# Patient Record
Sex: FEMALE | Race: WHITE | Employment: FULL TIME | URBAN - METROPOLITAN AREA
[De-identification: names, ages, dates, MRNs, and addresses within clinical notes are randomized per-mention and may not be internally consistent; named-entity substitution may affect disease eponyms.]

---

## 2017-02-06 ENCOUNTER — OFFICE VISIT (OUTPATIENT)
Dept: FAMILY MEDICINE CLINIC | Age: 47
End: 2017-02-06

## 2017-02-06 VITALS
SYSTOLIC BLOOD PRESSURE: 120 MMHG | HEART RATE: 80 BPM | WEIGHT: 276 LBS | HEIGHT: 71 IN | BODY MASS INDEX: 38.64 KG/M2 | DIASTOLIC BLOOD PRESSURE: 70 MMHG

## 2017-02-06 DIAGNOSIS — L28.2 PRURITIC RASH: Primary | ICD-10-CM

## 2017-02-06 PROCEDURE — 99213 OFFICE O/P EST LOW 20 MIN: CPT | Performed by: FAMILY MEDICINE

## 2017-02-06 RX ORDER — PREDNISONE 10 MG/1
TABLET ORAL
Qty: 21 TABLET | Refills: 0 | Status: SHIPPED | OUTPATIENT
Start: 2017-02-06 | End: 2017-02-16

## 2017-03-10 ENCOUNTER — TELEPHONE (OUTPATIENT)
Dept: FAMILY MEDICINE CLINIC | Age: 47
End: 2017-03-10

## 2017-03-10 RX ORDER — PREDNISONE 10 MG/1
10 TABLET ORAL 2 TIMES DAILY
Qty: 10 TABLET | Refills: 0 | Status: SHIPPED | OUTPATIENT
Start: 2017-03-10 | End: 2017-03-15

## 2017-03-10 RX ORDER — AZITHROMYCIN 250 MG/1
TABLET, FILM COATED ORAL
Qty: 1 PACKET | Refills: 0 | Status: SHIPPED | OUTPATIENT
Start: 2017-03-10 | End: 2017-03-20

## 2017-06-09 ENCOUNTER — OFFICE VISIT (OUTPATIENT)
Dept: FAMILY MEDICINE CLINIC | Age: 47
End: 2017-06-09

## 2017-06-09 ENCOUNTER — HOSPITAL ENCOUNTER (OUTPATIENT)
Dept: WOMENS IMAGING | Age: 47
Discharge: HOME | End: 2017-06-09
Attending: OBSTETRICS & GYNECOLOGY | Admitting: OBSTETRICS & GYNECOLOGY

## 2017-06-09 VITALS
SYSTOLIC BLOOD PRESSURE: 112 MMHG | BODY MASS INDEX: 41.13 KG/M2 | DIASTOLIC BLOOD PRESSURE: 60 MMHG | HEART RATE: 80 BPM | WEIGHT: 271.4 LBS | HEIGHT: 68 IN

## 2017-06-09 DIAGNOSIS — F51.01 PRIMARY INSOMNIA: ICD-10-CM

## 2017-06-09 DIAGNOSIS — F32.5 MAJOR DEPRESSIVE DISORDER WITH SINGLE EPISODE, IN FULL REMISSION (HCC): ICD-10-CM

## 2017-06-09 DIAGNOSIS — E78.2 MIXED HYPERLIPIDEMIA: Primary | ICD-10-CM

## 2017-06-09 DIAGNOSIS — Z12.31 VISIT FOR SCREENING MAMMOGRAM: ICD-10-CM

## 2017-06-09 LAB
ALBUMIN SERPL-MCNC: 4.4 G/DL (ref 3.4–5)
ALP BLD-CCNC: 57 U/L (ref 40–129)
ALT SERPL-CCNC: 19 U/L (ref 10–40)
AST SERPL-CCNC: 26 U/L (ref 15–37)
BILIRUB SERPL-MCNC: 2.3 MG/DL (ref 0–1)
BILIRUBIN DIRECT: 0.3 MG/DL (ref 0–0.3)
BILIRUBIN, INDIRECT: 2 MG/DL (ref 0–1)
TOTAL PROTEIN: 7.4 G/DL (ref 6.4–8.2)

## 2017-06-09 PROCEDURE — 36415 COLL VENOUS BLD VENIPUNCTURE: CPT | Performed by: FAMILY MEDICINE

## 2017-06-09 PROCEDURE — 99214 OFFICE O/P EST MOD 30 MIN: CPT | Performed by: FAMILY MEDICINE

## 2017-06-09 ASSESSMENT — PATIENT HEALTH QUESTIONNAIRE - PHQ9
1. LITTLE INTEREST OR PLEASURE IN DOING THINGS: 0
SUM OF ALL RESPONSES TO PHQ9 QUESTIONS 1 & 2: 0
2. FEELING DOWN, DEPRESSED OR HOPELESS: 0
SUM OF ALL RESPONSES TO PHQ QUESTIONS 1-9: 0

## 2017-10-06 ENCOUNTER — OFFICE VISIT (OUTPATIENT)
Dept: FAMILY MEDICINE CLINIC | Age: 47
End: 2017-10-06

## 2017-10-06 VITALS
DIASTOLIC BLOOD PRESSURE: 64 MMHG | HEIGHT: 71 IN | OXYGEN SATURATION: 96 % | WEIGHT: 280 LBS | SYSTOLIC BLOOD PRESSURE: 110 MMHG | RESPIRATION RATE: 20 BRPM | HEART RATE: 80 BPM | BODY MASS INDEX: 39.2 KG/M2

## 2017-10-06 DIAGNOSIS — Z23 NEEDS FLU SHOT: ICD-10-CM

## 2017-10-06 DIAGNOSIS — K21.00 GASTROESOPHAGEAL REFLUX DISEASE WITH ESOPHAGITIS: ICD-10-CM

## 2017-10-06 DIAGNOSIS — E80.4 GILBERT'S DISEASE: ICD-10-CM

## 2017-10-06 DIAGNOSIS — E78.2 MIXED HYPERLIPIDEMIA: Primary | ICD-10-CM

## 2017-10-06 LAB
ALBUMIN SERPL-MCNC: 4.5 G/DL (ref 3.4–5)
ALP BLD-CCNC: 64 U/L (ref 40–129)
ALT SERPL-CCNC: 18 U/L (ref 10–40)
AST SERPL-CCNC: 19 U/L (ref 15–37)
BILIRUB SERPL-MCNC: 2.1 MG/DL (ref 0–1)
BILIRUBIN DIRECT: 0.3 MG/DL (ref 0–0.3)
BILIRUBIN, INDIRECT: 1.8 MG/DL (ref 0–1)
CHOLESTEROL, TOTAL: 213 MG/DL (ref 0–199)
HDLC SERPL-MCNC: 62 MG/DL (ref 40–60)
LDL CHOLESTEROL CALCULATED: 112 MG/DL
TOTAL PROTEIN: 7.4 G/DL (ref 6.4–8.2)
TRIGL SERPL-MCNC: 194 MG/DL (ref 0–150)
VLDLC SERPL CALC-MCNC: 39 MG/DL

## 2017-10-06 PROCEDURE — 36415 COLL VENOUS BLD VENIPUNCTURE: CPT | Performed by: FAMILY MEDICINE

## 2017-10-06 PROCEDURE — 99214 OFFICE O/P EST MOD 30 MIN: CPT | Performed by: FAMILY MEDICINE

## 2017-10-06 PROCEDURE — 90688 IIV4 VACCINE SPLT 0.5 ML IM: CPT | Performed by: FAMILY MEDICINE

## 2017-10-06 PROCEDURE — 90471 IMMUNIZATION ADMIN: CPT | Performed by: FAMILY MEDICINE

## 2018-01-18 ENCOUNTER — OFFICE VISIT (OUTPATIENT)
Dept: FAMILY MEDICINE CLINIC | Age: 48
End: 2018-01-18

## 2018-01-18 VITALS
TEMPERATURE: 97.6 F | WEIGHT: 278 LBS | SYSTOLIC BLOOD PRESSURE: 129 MMHG | DIASTOLIC BLOOD PRESSURE: 77 MMHG | HEART RATE: 82 BPM | HEIGHT: 71 IN | BODY MASS INDEX: 38.92 KG/M2

## 2018-01-18 DIAGNOSIS — J11.1 INFLUENZA: Primary | ICD-10-CM

## 2018-01-18 PROCEDURE — 99213 OFFICE O/P EST LOW 20 MIN: CPT | Performed by: FAMILY MEDICINE

## 2018-01-18 RX ORDER — OSELTAMIVIR PHOSPHATE 75 MG/1
75 CAPSULE ORAL 2 TIMES DAILY
Qty: 10 CAPSULE | Refills: 0 | Status: SHIPPED | OUTPATIENT
Start: 2018-01-18 | End: 2018-01-23

## 2018-06-13 ENCOUNTER — HOSPITAL ENCOUNTER (OUTPATIENT)
Dept: WOMENS IMAGING | Age: 48
Discharge: HOME | End: 2018-06-13
Attending: FAMILY MEDICINE | Admitting: FAMILY MEDICINE

## 2018-06-13 DIAGNOSIS — Z12.31 VISIT FOR SCREENING MAMMOGRAM: ICD-10-CM

## 2018-06-29 ENCOUNTER — NURSE ONLY (OUTPATIENT)
Dept: FAMILY MEDICINE CLINIC | Age: 48
End: 2018-06-29

## 2018-06-29 DIAGNOSIS — E80.4 GILBERT'S DISEASE: Primary | ICD-10-CM

## 2018-06-29 LAB
ALBUMIN SERPL-MCNC: 4.5 G/DL (ref 3.4–5)
ALP BLD-CCNC: 61 U/L (ref 40–129)
ALT SERPL-CCNC: 16 U/L (ref 10–40)
AST SERPL-CCNC: 15 U/L (ref 15–37)
BILIRUB SERPL-MCNC: 1.8 MG/DL (ref 0–1)
BILIRUBIN DIRECT: 0.3 MG/DL (ref 0–0.3)
BILIRUBIN, INDIRECT: 1.5 MG/DL (ref 0–1)
TOTAL PROTEIN: 7.1 G/DL (ref 6.4–8.2)

## 2018-06-29 PROCEDURE — 36415 COLL VENOUS BLD VENIPUNCTURE: CPT | Performed by: FAMILY MEDICINE

## 2018-09-19 ENCOUNTER — TELEPHONE (OUTPATIENT)
Dept: FAMILY MEDICINE CLINIC | Age: 48
End: 2018-09-19

## 2018-09-19 RX ORDER — PREDNISONE 10 MG/1
10 TABLET ORAL 2 TIMES DAILY
Qty: 10 TABLET | Refills: 0 | Status: SHIPPED | OUTPATIENT
Start: 2018-09-19 | End: 2018-09-24

## 2018-09-19 RX ORDER — AZITHROMYCIN 250 MG/1
TABLET, FILM COATED ORAL
Qty: 1 PACKET | Refills: 0 | Status: SHIPPED | OUTPATIENT
Start: 2018-09-19 | End: 2018-09-29

## 2018-09-19 NOTE — TELEPHONE ENCOUNTER
The PT would like a call back regarding her fathers visit and her self having a sore throat.          Best call back--[phone]

## 2018-10-01 ENCOUNTER — TELEPHONE (OUTPATIENT)
Dept: FAMILY MEDICINE CLINIC | Age: 48
End: 2018-10-01

## 2018-10-01 DIAGNOSIS — N39.3 STRESS INCONTINENCE: Primary | ICD-10-CM

## 2018-10-01 NOTE — TELEPHONE ENCOUNTER
Pt called in stating that she's experiencing incontinence with a cough she has and she wants to know if something can be called in for her for the incontinence or if she needs an OV. Please call back to adv: [phone].

## 2018-10-01 NOTE — TELEPHONE ENCOUNTER
Not medicine for that. Should be evaluated by urologist for this.   I want her to see Christi Fong urologist    Referred pls assist scheduling

## 2018-10-05 ENCOUNTER — NURSE ONLY (OUTPATIENT)
Dept: FAMILY MEDICINE CLINIC | Age: 48
End: 2018-10-05
Payer: COMMERCIAL

## 2018-10-05 DIAGNOSIS — Z23 NEED FOR INFLUENZA VACCINATION: Primary | ICD-10-CM

## 2018-10-05 PROCEDURE — 90471 IMMUNIZATION ADMIN: CPT | Performed by: FAMILY MEDICINE

## 2018-10-05 PROCEDURE — 90682 RIV4 VACC RECOMBINANT DNA IM: CPT | Performed by: FAMILY MEDICINE

## 2018-10-08 ENCOUNTER — TELEPHONE (OUTPATIENT)
Dept: FAMILY MEDICINE CLINIC | Age: 48
End: 2018-10-08

## 2018-10-12 ENCOUNTER — HOSPITAL ENCOUNTER (OUTPATIENT)
Dept: CT IMAGING | Age: 48
Discharge: HOME | End: 2018-10-12
Payer: COMMERCIAL

## 2018-10-12 DIAGNOSIS — E66.8 CONSTITUTIONAL OBESITY: ICD-10-CM

## 2018-10-12 DIAGNOSIS — N39.3 FEMALE STRESS INCONTINENCE: ICD-10-CM

## 2018-10-12 DIAGNOSIS — R31.21 ASYMPTOMATIC MICROSCOPIC HEMATURIA: ICD-10-CM

## 2018-10-12 PROCEDURE — 6360000004 HC RX CONTRAST MEDICATION: Performed by: UROLOGY

## 2018-10-12 PROCEDURE — 74178 CT ABD&PLV WO CNTR FLWD CNTR: CPT

## 2018-10-12 RX ADMIN — IOPAMIDOL 75 ML: 755 INJECTION, SOLUTION INTRAVENOUS at 09:41

## 2018-10-24 ENCOUNTER — OFFICE VISIT (OUTPATIENT)
Dept: FAMILY MEDICINE CLINIC | Age: 48
End: 2018-10-24
Payer: COMMERCIAL

## 2018-10-24 VITALS
HEART RATE: 78 BPM | SYSTOLIC BLOOD PRESSURE: 128 MMHG | WEIGHT: 293 LBS | BODY MASS INDEX: 41.02 KG/M2 | HEIGHT: 71 IN | OXYGEN SATURATION: 98 % | DIASTOLIC BLOOD PRESSURE: 86 MMHG

## 2018-10-24 DIAGNOSIS — H61.22 IMPACTED CERUMEN OF LEFT EAR: ICD-10-CM

## 2018-10-24 DIAGNOSIS — R05.9 COUGH: Primary | ICD-10-CM

## 2018-10-24 PROCEDURE — 99213 OFFICE O/P EST LOW 20 MIN: CPT | Performed by: FAMILY MEDICINE

## 2018-10-24 PROCEDURE — 69210 REMOVE IMPACTED EAR WAX UNI: CPT | Performed by: FAMILY MEDICINE

## 2018-10-24 ASSESSMENT — PATIENT HEALTH QUESTIONNAIRE - PHQ9
SUM OF ALL RESPONSES TO PHQ QUESTIONS 1-9: 0
SUM OF ALL RESPONSES TO PHQ9 QUESTIONS 1 & 2: 0
1. LITTLE INTEREST OR PLEASURE IN DOING THINGS: 0
SUM OF ALL RESPONSES TO PHQ QUESTIONS 1-9: 0
2. FEELING DOWN, DEPRESSED OR HOPELESS: 0

## 2018-10-24 NOTE — PROGRESS NOTES
Chief Complaint   Patient presents with    Cough     No family history on file. Social History     Social History    Marital status:      Spouse name: N/A    Number of children: N/A    Years of education: N/A     Occupational History    Not on file. Social History Main Topics    Smoking status: Never Smoker    Smokeless tobacco: Never Used    Alcohol use Not on file    Drug use: Unknown    Sexual activity: Not on file     Other Topics Concern    Not on file     Social History Narrative    No narrative on file       Current Outpatient Prescriptions:     sertraline (ZOLOFT) 50 MG tablet, TAKE ONE TABLET BY MOUTH EVERY EVENING, Disp: 90 tablet, Rfl: 3    simvastatin (ZOCOR) 20 MG tablet, TAKE ONE TABLET BY MOUTH EVERY EVENING, Disp: 90 tablet, Rfl: 3  No Known Allergies    Patient Active Problem List   Diagnosis    Hyperlipidemia    Depression    Gilbert's disease    Obesity (BMI 35.0-39.9 without comorbidity)    Gastroesophageal reflux disease with esophagitis       HPI / ROS: Joy [place] presents for evaluation and management of :    # somewhat cough at tis pioint duration x 1 months. Received steroids and z-pack didn;t help cough. Til productive no fever or chills. CT done by urologist reviewed shwoed 4 mm noncalcified nodule. No wheeze denies reflux sx.     Objective   Wt Readings from Last 3 Encounters:   10/24/18 (!) 308 lb 2 oz (139.8 kg)   01/18/18 278 lb (126.1 kg)   10/06/17 280 lb (127 kg)       A&O  /86   Pulse 78   Ht 5' 11\" (1.803 m)   Wt (!) 308 lb 2 oz (139.8 kg)   SpO2 98%   BMI 42.97 kg/m²    Ears occluded wax left side  Eyes no scleral icterus  Skin no rash no jaundice  Neck no TMG no LAD  Car reg no MGR  Lungs CTA decent air movement  abd benign soft  Ext no pitting edema  Psych: Judgement and insight are intact, no pressured speech; no psychomotor retardation or agitation; affect and mood congruent    Procedure : removed wax from left canal using a

## 2019-03-22 ENCOUNTER — OFFICE VISIT (OUTPATIENT)
Dept: FAMILY MEDICINE CLINIC | Age: 49
End: 2019-03-22
Payer: COMMERCIAL

## 2019-03-22 VITALS
HEART RATE: 78 BPM | WEIGHT: 293 LBS | HEIGHT: 71 IN | SYSTOLIC BLOOD PRESSURE: 130 MMHG | BODY MASS INDEX: 41.02 KG/M2 | DIASTOLIC BLOOD PRESSURE: 88 MMHG | OXYGEN SATURATION: 98 %

## 2019-03-22 DIAGNOSIS — Z12.39 SCREENING FOR BREAST CANCER: ICD-10-CM

## 2019-03-22 DIAGNOSIS — E78.2 MIXED HYPERLIPIDEMIA: ICD-10-CM

## 2019-03-22 DIAGNOSIS — F32.5 MAJOR DEPRESSIVE DISORDER WITH SINGLE EPISODE, IN FULL REMISSION (HCC): ICD-10-CM

## 2019-03-22 DIAGNOSIS — Z00.00 ROUTINE GENERAL MEDICAL EXAMINATION AT A HEALTH CARE FACILITY: Primary | ICD-10-CM

## 2019-03-22 LAB
A/G RATIO: 1.4 (ref 1.1–2.2)
ALBUMIN SERPL-MCNC: 4.2 G/DL (ref 3.4–5)
ALP BLD-CCNC: 68 U/L (ref 40–129)
ALT SERPL-CCNC: 20 U/L (ref 10–40)
ANION GAP SERPL CALCULATED.3IONS-SCNC: 13 MMOL/L (ref 3–16)
AST SERPL-CCNC: 21 U/L (ref 15–37)
BILIRUB SERPL-MCNC: 1.7 MG/DL (ref 0–1)
BUN BLDV-MCNC: 13 MG/DL (ref 7–20)
CALCIUM SERPL-MCNC: 9.5 MG/DL (ref 8.3–10.6)
CHLORIDE BLD-SCNC: 104 MMOL/L (ref 99–110)
CHOLESTEROL, TOTAL: 193 MG/DL (ref 0–199)
CO2: 24 MMOL/L (ref 21–32)
CREAT SERPL-MCNC: 0.5 MG/DL (ref 0.6–1.1)
GFR AFRICAN AMERICAN: >60
GFR NON-AFRICAN AMERICAN: >60
GLOBULIN: 3 G/DL
GLUCOSE BLD-MCNC: 102 MG/DL (ref 70–99)
HDLC SERPL-MCNC: 59 MG/DL (ref 40–60)
LDL CHOLESTEROL CALCULATED: 94 MG/DL
POTASSIUM SERPL-SCNC: 4.2 MMOL/L (ref 3.5–5.1)
SODIUM BLD-SCNC: 141 MMOL/L (ref 136–145)
TOTAL PROTEIN: 7.2 G/DL (ref 6.4–8.2)
TRIGL SERPL-MCNC: 199 MG/DL (ref 0–150)
VLDLC SERPL CALC-MCNC: 40 MG/DL

## 2019-03-22 PROCEDURE — 99396 PREV VISIT EST AGE 40-64: CPT | Performed by: FAMILY MEDICINE

## 2019-03-22 PROCEDURE — 36415 COLL VENOUS BLD VENIPUNCTURE: CPT | Performed by: FAMILY MEDICINE

## 2019-03-22 NOTE — PROGRESS NOTES
Chief Complaint   Patient presents with    Hyperlipidemia     No family history on file.   Social History     Socioeconomic History    Marital status:      Spouse name: Not on file    Number of children: Not on file    Years of education: Not on file    Highest education level: Not on file   Occupational History    Not on file   Social Needs    Financial resource strain: Not on file    Food insecurity:     Worry: Not on file     Inability: Not on file    Transportation needs:     Medical: Not on file     Non-medical: Not on file   Tobacco Use    Smoking status: Never Smoker    Smokeless tobacco: Never Used   Substance and Sexual Activity    Alcohol use: Not on file    Drug use: Not on file    Sexual activity: Not on file   Lifestyle    Physical activity:     Days per week: Not on file     Minutes per session: Not on file    Stress: Not on file   Relationships    Social connections:     Talks on phone: Not on file     Gets together: Not on file     Attends Judaism service: Not on file     Active member of club or organization: Not on file     Attends meetings of clubs or organizations: Not on file     Relationship status: Not on file    Intimate partner violence:     Fear of current or ex partner: Not on file     Emotionally abused: Not on file     Physically abused: Not on file     Forced sexual activity: Not on file   Other Topics Concern    Not on file   Social History Narrative    Not on file       Current Outpatient Medications:     sertraline (ZOLOFT) 50 MG tablet, TAKE ONE TABLET BY MOUTH EVERY EVENING, Disp: 90 tablet, Rfl: 3    simvastatin (ZOCOR) 20 MG tablet, TAKE ONE TABLET BY MOUTH EVERY EVENING, Disp: 90 tablet, Rfl: 3  No Known Allergies    Patient Active Problem List   Diagnosis    Hyperlipidemia    Depression    Gilbert's disease    Obesity (BMI 35.0-39.9 without comorbidity)    Gastroesophageal reflux disease with esophagitis       HPI / ROS: Jillian Caldwell presents for evaluation and management of :    # preventive  # screen breast ca mammo sched  # hyperlipidemia  Lipids due to statin        Objective   Wt Readings from Last 3 Encounters:   03/22/19 (!) 308 lb (139.7 kg)   10/24/18 (!) 308 lb 2 oz (139.8 kg)   01/18/18 278 lb (126.1 kg)       A&O  /88   Pulse 78   Ht 5' 11\" (1.803 m)   Wt (!) 308 lb (139.7 kg)   SpO2 98%   BMI 42.96 kg/m²   Eyes no scleral icterus  Skin no rash no jaundice  Neck no TMG no LAD  Car reg no MGR  Lungs CTA  abd benign soft  Ext no pitting edema  Psych: Judgement and insight are intact, no pressured speech; no psychomotor retardation or agitation; affect and mood congruent     Diagnosis Orders   1. Routine general medical examination at a health care facility     2. Screening for breast cancer      mammo ordered   3. Mixed hyperlipidemia  COMPREHENSIVE METABOLIC PANEL    LIPID PANEL    LFTs lipids on statin   4.  Major depressive disorder with single episode, in full remission ([facility] [address]      \"oh god yeah\"\" sertraline helps reveiwed circadian charleen villar     Orders Placed This Encounter   Procedures    COMPREHENSIVE METABOLIC PANEL    LIPID PANEL     Order Specific Question:   Is Patient Fasting?/# of Hours     Answer:   8

## 2019-05-13 RX ORDER — SIMVASTATIN 20 MG
TABLET ORAL
Qty: 90 TABLET | Refills: 3 | Status: SHIPPED | OUTPATIENT
Start: 2019-05-13 | End: 2020-06-24

## 2019-06-14 ENCOUNTER — HOSPITAL ENCOUNTER (OUTPATIENT)
Dept: WOMENS IMAGING | Age: 49
Discharge: HOME | End: 2019-06-14
Payer: COMMERCIAL

## 2019-06-14 DIAGNOSIS — Z12.39 BREAST SCREENING: ICD-10-CM

## 2019-06-14 PROCEDURE — 77063 BREAST TOMOSYNTHESIS BI: CPT

## 2019-08-27 ENCOUNTER — TELEPHONE (OUTPATIENT)
Dept: FAMILY MEDICINE CLINIC | Age: 49
End: 2019-08-27

## 2019-09-20 ENCOUNTER — NURSE ONLY (OUTPATIENT)
Dept: FAMILY MEDICINE CLINIC | Age: 49
End: 2019-09-20
Payer: COMMERCIAL

## 2019-09-20 ENCOUNTER — TELEPHONE (OUTPATIENT)
Dept: FAMILY MEDICINE CLINIC | Age: 49
End: 2019-09-20

## 2019-09-20 DIAGNOSIS — Z23 NEED FOR INFLUENZA VACCINATION: ICD-10-CM

## 2019-09-20 DIAGNOSIS — Z23 NEEDS FLU SHOT: Primary | ICD-10-CM

## 2019-09-20 PROCEDURE — 90686 IIV4 VACC NO PRSV 0.5 ML IM: CPT | Performed by: FAMILY MEDICINE

## 2019-09-20 PROCEDURE — 90471 IMMUNIZATION ADMIN: CPT | Performed by: FAMILY MEDICINE

## 2019-09-20 NOTE — TELEPHONE ENCOUNTER
Maria M Danielle with the [facility] called requesting a copy of patient's current medications. Pt has an appointment in their office next week and they would like this before that time. Please fax to: [phone].     Best call back number: [phone]

## 2019-10-10 ENCOUNTER — NURSE ONLY (OUTPATIENT)
Dept: FAMILY MEDICINE CLINIC | Age: 49
End: 2019-10-10
Payer: COMMERCIAL

## 2019-10-10 DIAGNOSIS — E78.2 MIXED HYPERLIPIDEMIA: Primary | ICD-10-CM

## 2019-10-10 LAB
CHOLESTEROL, TOTAL: 213 MG/DL (ref 0–199)
HDLC SERPL-MCNC: 47 MG/DL (ref 40–60)
LDL CHOLESTEROL CALCULATED: ABNORMAL MG/DL
LDL CHOLESTEROL DIRECT: 122 MG/DL
TRIGL SERPL-MCNC: 350 MG/DL (ref 0–150)
VLDLC SERPL CALC-MCNC: ABNORMAL MG/DL

## 2019-10-10 PROCEDURE — 36415 COLL VENOUS BLD VENIPUNCTURE: CPT | Performed by: FAMILY MEDICINE

## 2019-10-11 LAB
A/G RATIO: 2.4 (ref 1.1–2.2)
ALBUMIN SERPL-MCNC: 5.2 G/DL (ref 3.4–5)
ALP BLD-CCNC: 67 U/L (ref 40–129)
ALT SERPL-CCNC: 18 U/L (ref 10–40)
ANION GAP SERPL CALCULATED.3IONS-SCNC: 18 MMOL/L (ref 3–16)
AST SERPL-CCNC: 18 U/L (ref 15–37)
BILIRUB SERPL-MCNC: 2.4 MG/DL (ref 0–1)
BUN BLDV-MCNC: 15 MG/DL (ref 7–20)
CALCIUM SERPL-MCNC: 9.9 MG/DL (ref 8.3–10.6)
CHLORIDE BLD-SCNC: 102 MMOL/L (ref 99–110)
CO2: 21 MMOL/L (ref 21–32)
CREAT SERPL-MCNC: 0.7 MG/DL (ref 0.6–1.1)
GFR AFRICAN AMERICAN: >60
GFR NON-AFRICAN AMERICAN: >60
GLOBULIN: 2.2 G/DL
GLUCOSE BLD-MCNC: 91 MG/DL (ref 70–99)
POTASSIUM SERPL-SCNC: 4.2 MMOL/L (ref 3.5–5.1)
SODIUM BLD-SCNC: 141 MMOL/L (ref 136–145)
TOTAL PROTEIN: 7.4 G/DL (ref 6.4–8.2)

## 2019-10-16 ENCOUNTER — TELEPHONE (OUTPATIENT)
Dept: FAMILY MEDICINE CLINIC | Age: 49
End: 2019-10-16

## 2019-10-23 ENCOUNTER — TELEPHONE (OUTPATIENT)
Dept: FAMILY MEDICINE CLINIC | Age: 49
End: 2019-10-23

## 2019-11-21 ENCOUNTER — TELEPHONE (OUTPATIENT)
Dept: FAMILY MEDICINE CLINIC | Age: 49
End: 2019-11-21

## 2019-11-21 DIAGNOSIS — F32.89 OTHER DEPRESSION: ICD-10-CM

## 2019-11-25 ENCOUNTER — TELEPHONE (OUTPATIENT)
Dept: FAMILY MEDICINE CLINIC | Age: 49
End: 2019-11-25

## 2020-03-09 ENCOUNTER — APPOINTMENT (OUTPATIENT)
Dept: CT IMAGING | Age: 50
End: 2020-03-09
Payer: COMMERCIAL

## 2020-03-09 ENCOUNTER — APPOINTMENT (OUTPATIENT)
Dept: GENERAL RADIOLOGY | Age: 50
End: 2020-03-09
Payer: COMMERCIAL

## 2020-03-09 ENCOUNTER — HOSPITAL ENCOUNTER (EMERGENCY)
Age: 50
Discharge: HOME | End: 2020-03-09
Attending: EMERGENCY MEDICINE
Payer: COMMERCIAL

## 2020-03-09 VITALS
HEART RATE: 71 BPM | BODY MASS INDEX: 41.02 KG/M2 | DIASTOLIC BLOOD PRESSURE: 72 MMHG | SYSTOLIC BLOOD PRESSURE: 124 MMHG | HEIGHT: 71 IN | TEMPERATURE: 98 F | OXYGEN SATURATION: 97 % | RESPIRATION RATE: 16 BRPM | WEIGHT: 293 LBS

## 2020-03-09 LAB
A/G RATIO: 1.3 (ref 1.1–2.2)
ALBUMIN SERPL-MCNC: 4.1 G/DL (ref 3.4–5)
ALP BLD-CCNC: 70 U/L (ref 40–129)
ALT SERPL-CCNC: 15 U/L (ref 10–40)
ANION GAP SERPL CALCULATED.3IONS-SCNC: 11 MMOL/L (ref 3–16)
AST SERPL-CCNC: 15 U/L (ref 15–37)
BASOPHILS ABSOLUTE: 0.1 K/UL (ref 0–0.2)
BASOPHILS RELATIVE PERCENT: 0.8 %
BILIRUB SERPL-MCNC: 1.1 MG/DL (ref 0–1)
BUN BLDV-MCNC: 12 MG/DL (ref 7–20)
CALCIUM SERPL-MCNC: 9.1 MG/DL (ref 8.3–10.6)
CHLORIDE BLD-SCNC: 102 MMOL/L (ref 99–110)
CO2: 26 MMOL/L (ref 21–32)
CREAT SERPL-MCNC: 0.7 MG/DL (ref 0.6–1.1)
EKG ATRIAL RATE: 62 BPM
EKG DIAGNOSIS: NORMAL
EKG P AXIS: 31 DEGREES
EKG P-R INTERVAL: 152 MS
EKG Q-T INTERVAL: 438 MS
EKG QRS DURATION: 88 MS
EKG QTC CALCULATION (BAZETT): 444 MS
EKG R AXIS: -14 DEGREES
EKG T AXIS: 5 DEGREES
EKG VENTRICULAR RATE: 62 BPM
EOSINOPHILS ABSOLUTE: 0.2 K/UL (ref 0–0.6)
EOSINOPHILS RELATIVE PERCENT: 2.3 %
GFR AFRICAN AMERICAN: >60
GFR NON-AFRICAN AMERICAN: >60
GLOBULIN: 3.2 G/DL
GLUCOSE BLD-MCNC: 107 MG/DL (ref 70–99)
GLUCOSE BLD-MCNC: 111 MG/DL (ref 70–99)
GONADOTROPIN, CHORIONIC (HCG) QUANT: <5 MIU/ML
HCT VFR BLD CALC: 40.4 % (ref 36–48)
HEMOGLOBIN: 13.5 G/DL (ref 12–16)
LYMPHOCYTES ABSOLUTE: 3.4 K/UL (ref 1–5.1)
LYMPHOCYTES RELATIVE PERCENT: 33.7 %
MCH RBC QN AUTO: 29.1 PG (ref 26–34)
MCHC RBC AUTO-ENTMCNC: 33.3 G/DL (ref 31–36)
MCV RBC AUTO: 87.3 FL (ref 80–100)
MONOCYTES ABSOLUTE: 0.6 K/UL (ref 0–1.3)
MONOCYTES RELATIVE PERCENT: 5.6 %
NEUTROPHILS ABSOLUTE: 5.7 K/UL (ref 1.7–7.7)
NEUTROPHILS RELATIVE PERCENT: 57.6 %
PDW BLD-RTO: 13.5 % (ref 12.4–15.4)
PERFORMED ON: ABNORMAL
PLATELET # BLD: 300 K/UL (ref 135–450)
PMV BLD AUTO: 8.7 FL (ref 5–10.5)
POTASSIUM REFLEX MAGNESIUM: 3.9 MMOL/L (ref 3.5–5.1)
RBC # BLD: 4.63 M/UL (ref 4–5.2)
SODIUM BLD-SCNC: 139 MMOL/L (ref 136–145)
TOTAL PROTEIN: 7.3 G/DL (ref 6.4–8.2)
TROPONIN: <0.01 NG/ML
WBC # BLD: 10 K/UL (ref 4–11)

## 2020-03-09 PROCEDURE — 84484 ASSAY OF TROPONIN QUANT: CPT

## 2020-03-09 PROCEDURE — 96374 THER/PROPH/DIAG INJ IV PUSH: CPT

## 2020-03-09 PROCEDURE — 70450 CT HEAD/BRAIN W/O DYE: CPT

## 2020-03-09 PROCEDURE — 80053 COMPREHEN METABOLIC PANEL: CPT

## 2020-03-09 PROCEDURE — 93010 ELECTROCARDIOGRAM REPORT: CPT | Performed by: INTERNAL MEDICINE

## 2020-03-09 PROCEDURE — 99284 EMERGENCY DEPT VISIT MOD MDM: CPT

## 2020-03-09 PROCEDURE — 71046 X-RAY EXAM CHEST 2 VIEWS: CPT

## 2020-03-09 PROCEDURE — 84702 CHORIONIC GONADOTROPIN TEST: CPT

## 2020-03-09 PROCEDURE — 93005 ELECTROCARDIOGRAM TRACING: CPT | Performed by: EMERGENCY MEDICINE

## 2020-03-09 PROCEDURE — 85025 COMPLETE CBC W/AUTO DIFF WBC: CPT

## 2020-03-09 PROCEDURE — 6360000002 HC RX W HCPCS: Performed by: EMERGENCY MEDICINE

## 2020-03-09 RX ORDER — KETOROLAC TROMETHAMINE 30 MG/ML
30 INJECTION, SOLUTION INTRAMUSCULAR; INTRAVENOUS ONCE
Status: COMPLETED | OUTPATIENT
Start: 2020-03-09 | End: 2020-03-09

## 2020-03-09 RX ADMIN — KETOROLAC TROMETHAMINE 30 MG: 30 INJECTION, SOLUTION INTRAMUSCULAR at 03:20

## 2020-03-09 ASSESSMENT — PAIN DESCRIPTION - FREQUENCY
FREQUENCY: CONTINUOUS

## 2020-03-09 ASSESSMENT — PAIN DESCRIPTION - PAIN TYPE
TYPE: ACUTE PAIN

## 2020-03-09 ASSESSMENT — PAIN SCALES - GENERAL
PAINLEVEL_OUTOF10: 5
PAINLEVEL_OUTOF10: 5
PAINLEVEL_OUTOF10: 0
PAINLEVEL_OUTOF10: 5

## 2020-03-09 ASSESSMENT — PAIN DESCRIPTION - PROGRESSION
CLINICAL_PROGRESSION: NOT CHANGED
CLINICAL_PROGRESSION: GRADUALLY WORSENING
CLINICAL_PROGRESSION: GRADUALLY WORSENING
CLINICAL_PROGRESSION: NOT CHANGED
CLINICAL_PROGRESSION: NOT CHANGED

## 2020-03-09 ASSESSMENT — PAIN DESCRIPTION - ONSET
ONSET: ON-GOING

## 2020-03-09 ASSESSMENT — PAIN DESCRIPTION - DESCRIPTORS
DESCRIPTORS: DISCOMFORT
DESCRIPTORS: DISCOMFORT
DESCRIPTORS: CONSTANT;DISCOMFORT
DESCRIPTORS: CONSTANT;DISCOMFORT

## 2020-03-09 ASSESSMENT — PAIN DESCRIPTION - LOCATION
LOCATION: GENERALIZED

## 2020-03-09 ASSESSMENT — PAIN - FUNCTIONAL ASSESSMENT
PAIN_FUNCTIONAL_ASSESSMENT: ACTIVITIES ARE NOT PREVENTED

## 2020-03-09 ASSESSMENT — PAIN DESCRIPTION - ORIENTATION
ORIENTATION: LEFT

## 2020-03-11 ENCOUNTER — OFFICE VISIT (OUTPATIENT)
Dept: FAMILY MEDICINE CLINIC | Age: 50
End: 2020-03-11
Payer: COMMERCIAL

## 2020-03-11 VITALS
OXYGEN SATURATION: 96 % | RESPIRATION RATE: 16 BRPM | SYSTOLIC BLOOD PRESSURE: 130 MMHG | BODY MASS INDEX: 42.54 KG/M2 | DIASTOLIC BLOOD PRESSURE: 95 MMHG | HEART RATE: 56 BPM | WEIGHT: 293 LBS

## 2020-03-11 PROCEDURE — 99214 OFFICE O/P EST MOD 30 MIN: CPT | Performed by: FAMILY MEDICINE

## 2020-03-11 NOTE — PROGRESS NOTES
Chief Complaint   Patient presents with    Follow-Up from Hospital    Numbness     No family history on file.   Social History     Socioeconomic History    Marital status:      Spouse name: Not on file    Number of children: Not on file    Years of education: Not on file    Highest education level: Not on file   Occupational History    Not on file   Social Needs    Financial resource strain: Not on file    Food insecurity     Worry: Not on file     Inability: Not on file    Transportation needs     Medical: Not on file     Non-medical: Not on file   Tobacco Use    Smoking status: Never Smoker    Smokeless tobacco: Never Used   Substance and Sexual Activity    Alcohol use: Not Currently    Drug use: Never    Sexual activity: Yes     Partners: Male   Lifestyle    Physical activity     Days per week: Not on file     Minutes per session: Not on file    Stress: Not on file   Relationships    Social connections     Talks on phone: Not on file     Gets together: Not on file     Attends Holiness service: Not on file     Active member of club or organization: Not on file     Attends meetings of clubs or organizations: Not on file     Relationship status: Not on file    Intimate partner violence     Fear of current or ex partner: Not on file     Emotionally abused: Not on file     Physically abused: Not on file     Forced sexual activity: Not on file   Other Topics Concern    Not on file   Social History Narrative    Not on file       Current Outpatient Medications:     sertraline (ZOLOFT) 50 MG tablet, TAKE ONE TABLET BY MOUTH EVERY EVENING, Disp: 90 tablet, Rfl: 3    simvastatin (ZOCOR) 20 MG tablet, TAKE ONE TABLET BY MOUTH EVERY EVENING, Disp: 90 tablet, Rfl: 3  No Known Allergies    Patient Active Problem List   Diagnosis    Hyperlipidemia    Depression    Gilbert's disease    Obesity (BMI 35.0-39.9 without comorbidity)    Gastroesophageal reflux disease with esophagitis       HPI / ROS: Angelica Daniels presents for evaluation and management of :    # acute ER f/u she had episode of let arm let leg  Felt micheal and needels. And numbness. + weakness. Mostly passed maybe some residual sx in left arm. Objective   Wt Readings from Last 3 Encounters:   03/11/20 (!) 305 lb (138.3 kg)   03/09/20 295 lb (133.8 kg)   03/22/19 (!) 308 lb (139.7 kg)       A&O  BP (!) 130/95   Pulse 56   Resp 16   Wt (!) 305 lb (138.3 kg)   SpO2 96%   BMI 42.54 kg/m²   Eyes no scleral icterus  Skin no rash no jaundice  Neuro - cn intact god drip strength full ROM strength arms and legs  SLR neg bilaterally  Neck no TMG no LAD  Car reg no MGR  Lungs CTA  abd benign soft obese  Psych: Judgement and insight are intact, no pressured speech; no psychomotor retardation or agitation; affect and mood congruent         Diagnosis Orders   1.  TIA (transient ischemic attack)  MRA NECK W CONTRAST    MRA HEAD W CONTRAST    Echocardiogram complete    imaging and rtc 2 weeks     Orders Placed This Encounter   Procedures    MRA NECK W CONTRAST     Standing Status:   Future     Standing Expiration Date:   3/11/2021    MRA HEAD W CONTRAST     Standing Status:   Future     Standing Expiration Date:   3/11/2021    Echocardiogram complete     Standing Status:   Future     Standing Expiration Date:   5/10/2020     Order Specific Question:   Reason for exam:     Answer:   r/u valvular embolic source

## 2020-03-12 ASSESSMENT — ENCOUNTER SYMPTOMS
COLOR CHANGE: 0
PHOTOPHOBIA: 0
VOMITING: 0
COUGH: 0
ABDOMINAL PAIN: 0
SHORTNESS OF BREATH: 0
TROUBLE SWALLOWING: 0

## 2020-03-12 NOTE — ED PROVIDER NOTES
There is no distension. Palpations: Abdomen is soft. Tenderness: There is no abdominal tenderness. Musculoskeletal: Normal range of motion. Skin:     General: Skin is warm and dry. Capillary Refill: Capillary refill takes less than 2 seconds. Neurological:      General: No focal deficit present. Mental Status: She is alert and oriented to person, place, and time. Mental status is at baseline. Cranial Nerves: No cranial nerve deficit. Sensory: No sensory deficit. Motor: No weakness. Coordination: Coordination normal.      Gait: Gait normal.         RESULTS     EKG: All EKG's are interpreted by the Emergency Department Physician who either signs or Co-signsthis chart in the absence of a cardiologist.    EKG shows sinus rhythm ventricular rate 60 bpm.  Patient's SC interval and QTc interval within acceptable range . There are no significant ST elevations or depressions EKG is nondiagnostic for ACS. There is no previous EKG to compare to. RADIOLOGY:   Non-plain filmimages such as CT, Ultrasound and MRI are read by the radiologist. Plain radiographic images are visualized and preliminarily interpreted by the emergency physician with the below findings:        Interpretation per the Radiologist below, if available at the time ofthis note:    XR CHEST STANDARD (2 VW)   Final Result   No evidence of acute process. CT HEAD WO CONTRAST   Final Result   No acute intracranial abnormality.                ED BEDSIDE ULTRASOUND:   Performed by ED Physician - none    LABS:  Labs Reviewed   COMPREHENSIVE METABOLIC PANEL W/ REFLEX TO MG FOR LOW K - Abnormal; Notable for the following components:       Result Value    Glucose 107 (*)     Total Bilirubin 1.1 (*)     All other components within normal limits    Narrative:     Performed at:  [facility] [address] [place], [place] [address]   Phone [phone]   POCT GLUCOSE - Abnormal; Notable for the following components:    POC Glucose 111 (*)     All other components within normal limits    Narrative:     Performed at:  [facility]  [address] [place] [address]   Phone [phone]   CBC WITH AUTO DIFFERENTIAL    Narrative:     Performed at:  [facility]  [address] [facility] [place] [address]   Phone [phone]   TROPONIN    Narrative:     Performed at:  [facility]  [address] [facility] [place] [address]   Phone [phone]   HCG, QUANTITATIVE, PREGNANCY    Narrative:     Performed at:  [facility]  [address] [facility] [place] [address]   Phone [phone]       All other labs were within normal range or not returned as of this dictation. EMERGENCY DEPARTMENT COURSE and DIFFERENTIAL DIAGNOSIS/MDM:   Vitals:    Vitals:    03/09/20 0350 03/09/20 0355 03/09/20 0402 03/09/20 0408   BP:       Pulse:       Resp:       Temp:       TempSrc:       SpO2: 96% 96% 97% 97%   Weight:       Height:           Patient was given thefollowing medications:  Medications   ketorolac (TORADOL) injection 30 mg (30 mg Intravenous Given 3/9/20 0320)       ED COURSE & MEDICAL DECISION MAKING    Pertinent Labs & Imaging studies reviewed. (See chart for details)   -  Patient seen and evaluated in the emergency department. -  Triage and nursing notes reviewed and incorporated. -  Old chart records reviewed and incorporated. -  Differential diagnosis includes: Differential diagnosis: thrombotic stroke, embolic stroke, hemorrhagic stroke, TIA,  hypoglycemia, mass lesions, metabolic cause, head injury, encephalopathy, multiple sclerosis, seizure, other  -  Work-up included:  See above  -  ED treatment included: See above    -Patient presented to the ED for evaluation of a tingling and burning sensation in the left arm.   States that she has previously had a sensation in the bilateral upper extremities but currently is in the left arm. On presentation NIH is 0. Vital signs are within normal limits. There is no sensory deficits. There is no drift or strength deficits. No cervical spinal tenderness. Patient will perform finger-nose testing without difficulties. There is no drift in the upper or lower extremities. Given the patient has no neurological deficits have a low clinical suspicion for CVA or ICH at this time.     -  Results discussed with patient. Labs show no emergent abnormalities. Imaging studies show no acute normalities on CT or chest x-ray. Patient feels improved on reevaluation. Symptoms are consistent with TIA or CVA. Patient is amenable to outpatient follow-up with her PCP. The patient is agreeable with plan of care and disposition.  -  Disposition:   Discharge      [address] time was 10 minutes, excluding separatelyreportable procedures. There was a high probability ofclinically significant/life threatening deterioration in the patient's condition which required my urgent intervention. CONSULTS:  None    PROCEDURES:  Unless otherwise noted below, none     Procedures    FINAL IMPRESSION      1.  Paresthesia          DISPOSITION/PLAN   DISPOSITION Decision To Discharge 03/09/2020 04:07:25 AM      PATIENT REFERREDTO:  Gideon Slaughter MD  [address] [place] [address]  [phone]    Schedule an appointment as soon as possible for a visit   As needed      DISCHARGEMEDICATIONS:  Discharge Medication List as of 3/9/2020  4:20 AM             (Please note that portions of this note were completed with a voice recognition program.  Efforts were made to edit the dictations but occasionally words are mis-transcribed.)    Nitesh Landaverde MD (electronically signed)  Attending Emergency Physician         Nitesh Landaverde MD  03/12/20 4801

## 2020-03-16 ENCOUNTER — HOSPITAL ENCOUNTER (OUTPATIENT)
Dept: MRI IMAGING | Age: 50
Discharge: HOME | End: 2020-03-16
Payer: COMMERCIAL

## 2020-03-16 ENCOUNTER — TELEPHONE (OUTPATIENT)
Dept: FAMILY MEDICINE CLINIC | Age: 50
End: 2020-03-16

## 2020-03-16 ENCOUNTER — HOSPITAL ENCOUNTER (OUTPATIENT)
Dept: NON INVASIVE DIAGNOSTICS | Age: 50
Discharge: HOME | End: 2020-03-16
Payer: COMMERCIAL

## 2020-03-16 LAB
LV EF: 58 %
LVEF MODALITY: NORMAL

## 2020-03-16 PROCEDURE — 93306 TTE W/DOPPLER COMPLETE: CPT

## 2020-03-16 PROCEDURE — 70544 MR ANGIOGRAPHY HEAD W/O DYE: CPT

## 2020-03-16 PROCEDURE — A9577 INJ MULTIHANCE: HCPCS | Performed by: FAMILY MEDICINE

## 2020-03-16 PROCEDURE — 6360000004 HC RX CONTRAST MEDICATION: Performed by: FAMILY MEDICINE

## 2020-03-16 PROCEDURE — 70549 MR ANGIOGRAPH NECK W/O&W/DYE: CPT

## 2020-03-16 RX ADMIN — GADOBENATE DIMEGLUMINE 20 ML: 529 INJECTION, SOLUTION INTRAVENOUS at 16:08

## 2020-03-17 ENCOUNTER — TELEPHONE (OUTPATIENT)
Dept: FAMILY MEDICINE CLINIC | Age: 50
End: 2020-03-17

## 2020-03-17 RX ORDER — AZITHROMYCIN 250 MG/1
TABLET, FILM COATED ORAL
Qty: 1 PACKET | Refills: 0 | Status: SHIPPED | OUTPATIENT
Start: 2020-03-17 | End: 2020-03-27

## 2020-03-17 NOTE — TELEPHONE ENCOUNTER
PT c/o Cough, congestion (brown), sneezing. Would like to know if an abx can be sent in to [facility]  PT has not been around anyone sick or traveled internationally.   PT cares for father on hospice

## 2020-04-15 ENCOUNTER — TELEPHONE (OUTPATIENT)
Dept: FAMILY MEDICINE CLINIC | Age: 50
End: 2020-04-15

## 2020-06-10 ENCOUNTER — OFFICE VISIT (OUTPATIENT)
Dept: FAMILY MEDICINE CLINIC | Age: 50
End: 2020-06-10
Payer: COMMERCIAL

## 2020-06-10 VITALS
DIASTOLIC BLOOD PRESSURE: 78 MMHG | RESPIRATION RATE: 17 BRPM | WEIGHT: 293 LBS | HEART RATE: 89 BPM | TEMPERATURE: 97.2 F | SYSTOLIC BLOOD PRESSURE: 136 MMHG | OXYGEN SATURATION: 97 % | BODY MASS INDEX: 42.68 KG/M2

## 2020-06-10 PROBLEM — Z86.73 HISTORY OF TIA (TRANSIENT ISCHEMIC ATTACK): Status: ACTIVE | Noted: 2020-06-10

## 2020-06-10 LAB
A/G RATIO: 1.7 (ref 1.1–2.2)
ALBUMIN SERPL-MCNC: 4.3 G/DL (ref 3.4–5)
ALP BLD-CCNC: 62 U/L (ref 40–129)
ALT SERPL-CCNC: 13 U/L (ref 10–40)
ANION GAP SERPL CALCULATED.3IONS-SCNC: 12 MMOL/L (ref 3–16)
AST SERPL-CCNC: 14 U/L (ref 15–37)
BILIRUB SERPL-MCNC: 2 MG/DL (ref 0–1)
BUN BLDV-MCNC: 11 MG/DL (ref 7–20)
CALCIUM SERPL-MCNC: 8.9 MG/DL (ref 8.3–10.6)
CHLORIDE BLD-SCNC: 103 MMOL/L (ref 99–110)
CHOLESTEROL, TOTAL: 204 MG/DL (ref 0–199)
CO2: 23 MMOL/L (ref 21–32)
CREAT SERPL-MCNC: 0.6 MG/DL (ref 0.6–1.1)
GFR AFRICAN AMERICAN: >60
GFR NON-AFRICAN AMERICAN: >60
GLOBULIN: 2.5 G/DL
GLUCOSE BLD-MCNC: 106 MG/DL (ref 70–99)
HDLC SERPL-MCNC: 52 MG/DL (ref 40–60)
LDL CHOLESTEROL CALCULATED: 104 MG/DL
POTASSIUM SERPL-SCNC: 4.3 MMOL/L (ref 3.5–5.1)
SODIUM BLD-SCNC: 138 MMOL/L (ref 136–145)
TOTAL PROTEIN: 6.8 G/DL (ref 6.4–8.2)
TRIGL SERPL-MCNC: 240 MG/DL (ref 0–150)
VLDLC SERPL CALC-MCNC: 48 MG/DL

## 2020-06-10 PROCEDURE — 36415 COLL VENOUS BLD VENIPUNCTURE: CPT | Performed by: FAMILY MEDICINE

## 2020-06-10 PROCEDURE — 20610 DRAIN/INJ JOINT/BURSA W/O US: CPT | Performed by: FAMILY MEDICINE

## 2020-06-10 PROCEDURE — 99396 PREV VISIT EST AGE 40-64: CPT | Performed by: FAMILY MEDICINE

## 2020-06-10 RX ORDER — METHYLPREDNISOLONE ACETATE 80 MG/ML
80 INJECTION, SUSPENSION INTRA-ARTICULAR; INTRALESIONAL; INTRAMUSCULAR; SOFT TISSUE ONCE
Status: COMPLETED | OUTPATIENT
Start: 2020-06-10 | End: 2020-06-10

## 2020-06-10 RX ADMIN — METHYLPREDNISOLONE ACETATE 80 MG: 80 INJECTION, SUSPENSION INTRA-ARTICULAR; INTRALESIONAL; INTRAMUSCULAR; SOFT TISSUE at 08:42

## 2020-06-10 NOTE — PROGRESS NOTES
mammo ordered   3. Mixed hyperlipidemia  Lipid Panel    Comprehensive Metabolic Panel    CMP, lipids  on statin continue   4. Major depressive disorder with single episode, in full remission ([address]      OK serteraline per pt lilly ue   5. History of TIA (transient ischemic attack)      statin and asa 81 mg daily   6. Primary osteoarthritis of right knee  methylPREDNISolone acetate (DEPO-MEDROL) injection 80 mg    [place] ARTHROCENTESIS ASPIR&/INJ MAJOR JT/BURSA W/O US    injected as above   7.  Screen for colon cancer  ELROY Rodriguez MD, Gastroenterology, [facility]     Orders Placed This Encounter   Procedures    LILI DIGITAL SCREEN W CAD BILATERAL     Standing Status:   Future     Standing Expiration Date:   6/30/2021    Lipid Panel     Order Specific Question:   Is Patient Fasting?/# of Hours     Answer:   yes    Comprehensive Metabolic Panel    ELROY Rodriguez MD, Gastroenterology, [facility]     Referral Priority:   Routine     Referral Type:   Eval and Treat     Referral Reason:   Specialty Services Required     Referred to Provider:   Bib Trevizo MD     Requested Specialty:   Gastroenterology     Number of Visits Requested:   1    [place] ARTHROCENTESIS ASPIR&/INJ MAJOR JT/BURSA W/O US

## 2020-06-19 ENCOUNTER — HOSPITAL ENCOUNTER (OUTPATIENT)
Dept: WOMENS IMAGING | Age: 50
Discharge: HOME | End: 2020-06-19
Payer: COMMERCIAL

## 2020-06-19 PROCEDURE — 77063 BREAST TOMOSYNTHESIS BI: CPT

## 2020-06-25 RX ORDER — SIMVASTATIN 20 MG
20 TABLET ORAL NIGHTLY
Qty: 90 TABLET | Refills: 3 | Status: SHIPPED | OUTPATIENT
Start: 2020-06-25 | End: 2020-12-17 | Stop reason: SDUPTHER

## 2020-06-30 ENCOUNTER — OFFICE VISIT (OUTPATIENT)
Dept: PRIMARY CARE CLINIC | Age: 50
End: 2020-06-30
Payer: COMMERCIAL

## 2020-06-30 PROCEDURE — 99211 OFF/OP EST MAY X REQ PHY/QHP: CPT | Performed by: NURSE PRACTITIONER

## 2020-06-30 NOTE — PROGRESS NOTES
Patient presented to [facility] drive up clinic for preop testing. Patient was swabbed and given information advising them to remain isolated until procedure date.

## 2020-07-01 NOTE — PROGRESS NOTES
Preoperative Screening for Elective Surgery/Invasive Procedures While COVID-19 present in the community     Have you tested positive or have been told to self-isolate for COVID-19 like symptoms within the past 28 days? n   Do you currently have any of the following symptoms? n  o Fever >100.0 F or 99.9 F in immunocompromised patients?n  o New onset cough, shortness of breath or difficulty breathing?n  o New onset sore throat, myalgia (muscle aches and pains), headache, loss of taste/smell or diarrhea?n   Have you had a potential exposure to COVID-19 within the past 14 days by:  o Close contact with a confirmed case?n  o Close contact with a healthcare worker,  or essential infrastructure worker (grocery store, [employer], gas station, public utilities or transportation)? YES  o Do you reside in a congregate setting such as; skilled nursing facility, adult home, correctional facility, homeless shelter or other institutional setting? no  o Have you had recent travel to a known COVID-19 hotspot? Pt resides in [facility] if the patient has a positive screen by answering yes to one or more of the above questions. Patients who test positive or screen positive prior to surgery or on the day of surgery should be evaluated in conjunction with the surgeon/proceduralist/anesthesiologist to determine the urgency of the procedure.

## 2020-07-01 NOTE — PROGRESS NOTES
C-Difficile admission screening and protocol:     * Admitted with diarrhea? n     *Prior history of C-Diff. In last 3 months?n     *Antibiotic use in the past 6-8 weeks?n     *Prior hospitalization or nursing home in the last month?  n      [address] time______6am______        Surgery time____________    Take the following medications with a sip of water:    Do not eat or drink anything after 12:00 midnight prior to your surgery. This includes water chewing gum, mints and ice chips. You may brush your teeth and gargle the morning of your surgery, but do not swallow the water     Please see your family doctor/pediatrician for a history and physical and/or concerning medications. Bring any test results/reports from your physicians office. If you are under the care of a heart doctor or specialist doctor, please be aware that you may be asked to them for clearance    You may be asked to stop blood thinners such as Coumadin, Plavix, Fragmin, Lovenox, etc., or any anti-inflammatories such as:  Aspirin, Ibuprofen, Advil, Naproxen prior to your surgery. We also ask that you stop any OTC medications such as fish oil, vitamin E, glucosamine, garlic, Multivitamins, COQ 10, etc.    We ask that you do not smoke 24 hours prior to surgery  We ask that you do not  drink any alcoholic beverages 24 hours prior to surgery     You must make arrangements for a responsible adult to take you home after your surgery. For your safety you will not be allowed to leave alone or drive yourself home. Your surgery will be cancelled if you do not have a ride home. Also for your safety, it is strongly suggested that someone stay with you the first 24 hours after your surgery. A parent or legal guardian must accompany a child scheduled for surgery and plan to stay at the hospital until the child is discharged. Please do not bring other children with you.     For your comfort, please wear simple loose fitting clothing to the hospital.  Please do not bring valuables. Do not wear any make-up or nail polish on your fingers or toes      For your safety, please do not wear any jewelry or body piercing's on the day of surgery. All jewelry must be removed. If you have dentures, they will be removed before going to operating room. For your convenience, we will provide you with a container. If you wear contact lenses or glasses, they will be removed, please bring a case for them. If you have a living will and a durable power of  for healthcare, please bring in a copy. As part of our patient safety program to minimize surgical site infections, we ask you to do the following:    · Please notify your surgeon if you develop any illness between         now and the  day of your surgery. · This includes a cough, cold, fever, sore throat, nausea,         or vomiting, and diarrhea, etc.  ·  Please notify your surgeon if you experience dizziness, shortness         of breath or blurred vision between now and the time of your surgery. Do not shave your operative site 96 hours prior to surgery. For face and neck surgery, men may use an electric razor 48 hours   prior to surgery. You may shower the night before surgery or the morning of   your surgery with an antibacterial soap. You will need to bring a photo ID and insurance card    [facility] has an onsite pharmacy, would you like to utilize our pharmacy     If you will be staying overnight and use a C-pap machine, please bring   your C-pap to hospital     Our goal is to provide you with excellent care, therefore, visitors will be limited to two(2) in the room at a time so that we may focus on providing this care for you. Please contact pre-admission testing if you have any further questions.                  [facility] phone number:  [address] PAT fax number:  [phone]  Please note these are generalized instructions for all surgical cases, you may be provided with more specific instructions according to your surgery.

## 2020-07-02 ENCOUNTER — ANESTHESIA EVENT (OUTPATIENT)
Dept: ENDOSCOPY | Age: 50
End: 2020-07-02
Payer: COMMERCIAL

## 2020-07-03 LAB
SARS-COV-2: NOT DETECTED
SOURCE: NORMAL

## 2020-07-06 ENCOUNTER — HOSPITAL ENCOUNTER (OUTPATIENT)
Age: 50
Setting detail: OUTPATIENT SURGERY
Discharge: HOME | End: 2020-07-06
Attending: INTERNAL MEDICINE | Admitting: INTERNAL MEDICINE
Payer: COMMERCIAL

## 2020-07-06 ENCOUNTER — ANESTHESIA (OUTPATIENT)
Dept: ENDOSCOPY | Age: 50
End: 2020-07-06
Payer: COMMERCIAL

## 2020-07-06 VITALS
OXYGEN SATURATION: 98 % | RESPIRATION RATE: 18 BRPM | HEART RATE: 54 BPM | DIASTOLIC BLOOD PRESSURE: 69 MMHG | BODY MASS INDEX: 41.02 KG/M2 | HEIGHT: 71 IN | WEIGHT: 293 LBS | TEMPERATURE: 97.2 F | SYSTOLIC BLOOD PRESSURE: 123 MMHG

## 2020-07-06 VITALS
RESPIRATION RATE: 20 BRPM | TEMPERATURE: 96.8 F | DIASTOLIC BLOOD PRESSURE: 62 MMHG | SYSTOLIC BLOOD PRESSURE: 106 MMHG | OXYGEN SATURATION: 98 %

## 2020-07-06 LAB — PREGNANCY, URINE: NEGATIVE

## 2020-07-06 PROCEDURE — 84703 CHORIONIC GONADOTROPIN ASSAY: CPT

## 2020-07-06 PROCEDURE — 7100000010 HC PHASE II RECOVERY - FIRST 15 MIN: Performed by: INTERNAL MEDICINE

## 2020-07-06 PROCEDURE — 2709999900 HC NON-CHARGEABLE SUPPLY: Performed by: INTERNAL MEDICINE

## 2020-07-06 PROCEDURE — 2500000003 HC RX 250 WO HCPCS

## 2020-07-06 PROCEDURE — 7100000011 HC PHASE II RECOVERY - ADDTL 15 MIN: Performed by: INTERNAL MEDICINE

## 2020-07-06 PROCEDURE — 3700000000 HC ANESTHESIA ATTENDED CARE: Performed by: INTERNAL MEDICINE

## 2020-07-06 PROCEDURE — 3700000001 HC ADD 15 MINUTES (ANESTHESIA): Performed by: INTERNAL MEDICINE

## 2020-07-06 PROCEDURE — 2580000003 HC RX 258: Performed by: ANESTHESIOLOGY

## 2020-07-06 PROCEDURE — 7100000000 HC PACU RECOVERY - FIRST 15 MIN: Performed by: INTERNAL MEDICINE

## 2020-07-06 PROCEDURE — 3609027000 HC COLONOSCOPY: Performed by: INTERNAL MEDICINE

## 2020-07-06 PROCEDURE — 6360000002 HC RX W HCPCS

## 2020-07-06 RX ORDER — SODIUM CHLORIDE 9 MG/ML
INJECTION, SOLUTION INTRAVENOUS CONTINUOUS
Status: DISCONTINUED | OUTPATIENT
Start: 2020-07-06 | End: 2020-07-06 | Stop reason: HOSPADM

## 2020-07-06 RX ORDER — ONDANSETRON 2 MG/ML
4 INJECTION INTRAMUSCULAR; INTRAVENOUS
Status: DISCONTINUED | OUTPATIENT
Start: 2020-07-06 | End: 2020-07-06 | Stop reason: HOSPADM

## 2020-07-06 RX ORDER — PROPOFOL 10 MG/ML
INJECTION, EMULSION INTRAVENOUS PRN
Status: DISCONTINUED | OUTPATIENT
Start: 2020-07-06 | End: 2020-07-06 | Stop reason: SDUPTHER

## 2020-07-06 RX ORDER — PROPOFOL 10 MG/ML
INJECTION, EMULSION INTRAVENOUS CONTINUOUS PRN
Status: DISCONTINUED | OUTPATIENT
Start: 2020-07-06 | End: 2020-07-06 | Stop reason: SDUPTHER

## 2020-07-06 RX ORDER — LIDOCAINE HYDROCHLORIDE 20 MG/ML
INJECTION, SOLUTION EPIDURAL; INFILTRATION; INTRACAUDAL; PERINEURAL PRN
Status: DISCONTINUED | OUTPATIENT
Start: 2020-07-06 | End: 2020-07-06 | Stop reason: SDUPTHER

## 2020-07-06 RX ORDER — SODIUM CHLORIDE 0.9 % (FLUSH) 0.9 %
10 SYRINGE (ML) INJECTION PRN
Status: DISCONTINUED | OUTPATIENT
Start: 2020-07-06 | End: 2020-07-06 | Stop reason: HOSPADM

## 2020-07-06 RX ORDER — LABETALOL HYDROCHLORIDE 5 MG/ML
5 INJECTION, SOLUTION INTRAVENOUS EVERY 10 MIN PRN
Status: DISCONTINUED | OUTPATIENT
Start: 2020-07-06 | End: 2020-07-06 | Stop reason: HOSPADM

## 2020-07-06 RX ORDER — SODIUM CHLORIDE 0.9 % (FLUSH) 0.9 %
10 SYRINGE (ML) INJECTION EVERY 12 HOURS SCHEDULED
Status: DISCONTINUED | OUTPATIENT
Start: 2020-07-06 | End: 2020-07-06 | Stop reason: HOSPADM

## 2020-07-06 RX ORDER — GLYCOPYRROLATE 0.2 MG/ML
INJECTION INTRAMUSCULAR; INTRAVENOUS PRN
Status: DISCONTINUED | OUTPATIENT
Start: 2020-07-06 | End: 2020-07-06 | Stop reason: SDUPTHER

## 2020-07-06 RX ORDER — PROMETHAZINE HYDROCHLORIDE 25 MG/ML
6.25 INJECTION, SOLUTION INTRAMUSCULAR; INTRAVENOUS
Status: DISCONTINUED | OUTPATIENT
Start: 2020-07-06 | End: 2020-07-06 | Stop reason: HOSPADM

## 2020-07-06 RX ADMIN — PROPOFOL 180 MCG/KG/MIN: 10 INJECTION, EMULSION INTRAVENOUS at 07:31

## 2020-07-06 RX ADMIN — SODIUM CHLORIDE: 9 INJECTION, SOLUTION INTRAVENOUS at 06:29

## 2020-07-06 RX ADMIN — PROPOFOL 20 MG: 10 INJECTION, EMULSION INTRAVENOUS at 07:44

## 2020-07-06 RX ADMIN — PROPOFOL 100 MG: 10 INJECTION, EMULSION INTRAVENOUS at 07:31

## 2020-07-06 RX ADMIN — FAMOTIDINE 20 MG: 10 INJECTION, SOLUTION INTRAVENOUS at 07:28

## 2020-07-06 RX ADMIN — LIDOCAINE HYDROCHLORIDE 60 MG: 20 INJECTION, SOLUTION EPIDURAL; INFILTRATION; INTRACAUDAL; PERINEURAL at 07:31

## 2020-07-06 RX ADMIN — GLYCOPYRROLATE 0.1 MG: 0.2 INJECTION, SOLUTION INTRAMUSCULAR; INTRAVENOUS at 07:28

## 2020-07-06 RX ADMIN — PROPOFOL 20 MG: 10 INJECTION, EMULSION INTRAVENOUS at 07:33

## 2020-07-06 RX ADMIN — PROPOFOL 20 MG: 10 INJECTION, EMULSION INTRAVENOUS at 07:39

## 2020-07-06 ASSESSMENT — PULMONARY FUNCTION TESTS
PIF_VALUE: 0

## 2020-07-06 ASSESSMENT — PAIN SCALES - GENERAL
PAINLEVEL_OUTOF10: 0

## 2020-07-06 ASSESSMENT — PAIN - FUNCTIONAL ASSESSMENT: PAIN_FUNCTIONAL_ASSESSMENT: 0-10

## 2020-07-06 NOTE — ANESTHESIA POSTPROCEDURE EVALUATION
[facility] Department of Anesthesiology  Post-Anesthesia Note       Name:  Connie Brown                                  Age:  53 y.o. MRN:  [id number]     Last Vitals & Oxygen Saturation: /69   Pulse 54   Temp 97.2 °F (36.2 °C) (Oral)   Resp 18   Ht 5' 11\" (1.803 m)   Wt (!) 301 lb 11.2 oz (136.8 kg)   SpO2 98%   BMI 42.08 kg/m²   Patient Vitals for the past 4 hrs:   BP Temp Temp src Pulse Resp SpO2   07/06/20 0834 123/69 -- -- 54 18 98 %   07/06/20 0810 124/72 97.2 °F (36.2 °C) Oral 53 18 97 %   07/06/20 0805 109/69 -- -- 54 18 99 %   07/06/20 0800 109/66 97.2 °F (36.2 °C) Temporal 63 18 99 %   07/06/20 0759 -- -- -- 62 18 99 %   07/06/20 0758 -- -- -- 63 21 99 %   07/06/20 0757 106/60 97.2 °F (36.2 °C) Temporal 65 19 100 %   07/06/20 0756 106/60 -- -- 71 23 97 %       Level of consciousness:  Awake, alert    Respiratory: Respirations easy, no distress. Stable. Cardiovascular: Hemodynamically stable. Hydration: Adequate. PONV: Adequately managed. Post-op pain: Adequately controlled. Post-op assessment: Tolerated anesthetic well without complication. Complications:  None.     Loren Coreas MD  July 6, 2020   10:28 AM

## 2020-07-06 NOTE — PROGRESS NOTES
Pt alert and oriented, denies pain, vitals within normal limits. Pt's abdomen soft, rounded. Pt's  is in the car, cell phone number on face sheet, ok to be updated. Pt's belongings in locker 12-A. Prep finished at 0330, reports clear stool.

## 2020-07-06 NOTE — H&P
Pre-operative History and Physical    Patient: Nikhil Le  : 1970  Acct#:     Intended Procedure:  Colonoscopy     HISTORY OF PRESENT ILLNESS:  The patient is a 53 y.o. female  who presents for/due to Screening     Past Medical History:        Diagnosis Date    Arthritis     Cancer ([address]     basal-skin    Depression     Hyperlipidemia      Past Surgical History:        Procedure Laterality Date     SECTION      x1    CHOLECYSTECTOMY      COLONOSCOPY      ENDOMETRIAL ABLATION      EYE SURGERY      bilateral     Medications Prior to Admission:   Prior to Admission medications    Medication Sig Start Date End Date Taking? Authorizing Provider   simvastatin (ZOCOR) 20 MG tablet Take 1 tablet by mouth nightly 20   Chino Dumont MD   sertraline (ZOLOFT) 50 MG tablet TAKE ONE TABLET BY MOUTH EVERY EVENING 19   Chino Dumont MD       Allergies:  Patient has no known allergies. Social History:   TOBACCO:   reports that she has never smoked. She has never used smokeless tobacco.  ETOH:   reports previous alcohol use. DRUGS:   reports no history of drug use. PHYSICAL EXAM:      Vital Signs: /73   Pulse 87   Temp 97 °F (36.1 °C) (Temporal)   Resp 17   Ht 5' 11\" (1.803 m)   Wt (!) 301 lb 11.2 oz (136.8 kg)   SpO2 95%   BMI 42.08 kg/m²    Airway: No stridor or wheezing noted. Good air movement  Pulmonary: without wheezes. Clear to auscultation  Cardiac:regular rate and rhythm without loud murmurs  Abdomen:soft, nontender,  Bowel sounds present    Pre-Procedure Assessment / Plan:  1) Colonoscopy    ASA Grade:  ASA 3 - Patient with moderate systemic disease with functional limitations  Mallampati Classification:  Class III    Level of Sedation Plan:Deep sedation    Post Procedure plan: Return to same level of care    I assessed the patient and find that the patient is in satisfactory condition to proceed with the planned procedure and sedation plan.     I have explained the risk, benefits, and alternatives to the procedure; the patient understands and agrees to proceed.        Izzy Colunga MD  7/6/2020

## 2020-07-06 NOTE — ANESTHESIA PRE PROCEDURE
[facility] Department of Anesthesiology  Pre-Anesthesia Evaluation/Consultation       Name:  Sara Perry  : 1970  Age:  53 y.o. MRN:  [id number]  Date: 2020           Surgeon: Surgeon(s):  Tanika Purvis MD    Procedure: Procedure(s):  COLONOSCOPY     No Known Allergies  Patient Active Problem List   Diagnosis    Hyperlipidemia    Depression    Gilbert's disease    Obesity (BMI 35.0-39.9 without comorbidity)    Gastroesophageal reflux disease with esophagitis    History of TIA (transient ischemic attack)     Past Medical History:   Diagnosis Date    Arthritis     Cancer [address] [address]     basal-skin    Depression     Hyperlipidemia      Past Surgical History:   Procedure Laterality Date     SECTION      x1    CHOLECYSTECTOMY      COLONOSCOPY      ENDOMETRIAL ABLATION      EYE SURGERY      bilateral     Social History     Tobacco Use    Smoking status: Never Smoker    Smokeless tobacco: Never Used   Substance Use Topics    Alcohol use: Not Currently    Drug use: Never     Medications  No current facility-administered medications on file prior to encounter.       Current Outpatient Medications on File Prior to Encounter   Medication Sig Dispense Refill    sertraline (ZOLOFT) 50 MG tablet TAKE ONE TABLET BY MOUTH EVERY EVENING 90 tablet 3     Current Facility-Administered Medications   Medication Dose Route Frequency Provider Last Rate Last Dose    0.9 % sodium chloride infusion   Intravenous Continuous Azalea Romero  mL/hr at 20 0629      sodium chloride flush 0.9 % injection 10 mL  10 mL Intravenous 2 times per day Azalea Romero MD        sodium chloride flush 0.9 % injection 10 mL  10 mL Intravenous PRN Azalea Romero MD         Vital Signs (Current)   Vitals:    20 0619   BP: 134/73   Pulse: 87   Resp: 17   Temp: 97 °F (36.1 °C)   SpO2: 95%     Vital Signs Statistics (for past 48 hrs)     Temp  Av °F (36.1 °C)  Min: 97 °F (36.1 °C)   Min taken time: 20  Max: 97 °F (36.1 °C)   Max taken time: 20  Pulse  Av  Min: 87   Min taken time: 20  Max: 80   Max taken time: 20  Resp  Av  Min: 16   Min taken time: 20  Max: 16   Max taken time: 20  BP  Min: 134/73   Min taken time: 20  Max: 134/73   Max taken time: 20  SpO2  Av %  Min: 95 %   Min taken time: 20  Max: 95 %   Max taken time: 20    BP Readings from Last 3 Encounters:   20 134/73   06/10/20 136/78   20 (!) 130/95     BMI  Body mass index is 42.08 kg/m². Estimated body mass index is 42.08 kg/m² as calculated from the following:    Height as of this encounter: 5' 11\" (1.803 m). Weight as of this encounter: 301 lb 11.2 oz (136.8 kg). CBC   Lab Results   Component Value Date    WBC 10.0 2020    RBC 4.63 2020    HGB 13.5 2020    HCT 40.4 2020    MCV 87.3 2020    RDW 13.5 2020     2020     CMP    Lab Results   Component Value Date     06/10/2020    K 4.3 06/10/2020    K 3.9 2020     06/10/2020    CO2 23 06/10/2020    BUN 11 06/10/2020    CREATININE 0.6 06/10/2020    GFRAA >60 06/10/2020    AGRATIO 1.7 06/10/2020    LABGLOM >60 06/10/2020    GLUCOSE 106 06/10/2020    PROT 6.8 06/10/2020    PROT 7.6 10/04/2012    CALCIUM 8.9 06/10/2020    BILITOT 2.0 06/10/2020    ALKPHOS 62 06/10/2020    AST 14 06/10/2020    ALT 13 06/10/2020     BMP    Lab Results   Component Value Date     06/10/2020    K 4.3 06/10/2020    K 3.9 2020     06/10/2020    CO2 23 06/10/2020    BUN 11 06/10/2020    CREATININE 0.6 06/10/2020    CALCIUM 8.9 06/10/2020    GFRAA >60 06/10/2020    LABGLOM >60 06/10/2020    GLUCOSE 106 06/10/2020     POCGlucose  No results for input(s): GLUCOSE in the last 72 hours.    Coags  No results found for: PROTIME, INR, APTT  HCG (If Applicable)   Lab Results   Component Value Date    PREGTESTUR Negative 07/06/2020      ABGs No results found for: PHART, PO2ART, ROF9XWO, SZA0YDN, BEART, Z7JCFQHM   Type & Screen (If Applicable)  No results found for: LABABO, LABRH                         BMI: Wt Readings from Last 3 Encounters:       NPO Status:   Date of last liquid consumption: 07/06/20   Time of last liquid consumption: 0330   Date of last solid food consumption: 07/04/20      Time of last solid consumption: 1830       Anesthesia Evaluation  Patient summary reviewed no history of anesthetic complications:   Airway: Mallampati: III  TM distance: >3 FB   Neck ROM: full   Dental: normal exam         Pulmonary:Negative Pulmonary ROS and normal exam                               Cardiovascular:  Exercise tolerance: good (>4 METS),         ECG reviewed  Rhythm: regular  Rate: normal  Echocardiogram reviewed               ROS comment: EF 55       Neuro/Psych:   (+) TIA (patient denies though in history), psychiatric history:depression/anxiety             GI/Hepatic/Renal:   (+) GERD:, bowel prep, morbid obesity          Endo/Other: Negative Endo/Other ROS                    Abdominal:   (+) obese,         Vascular: negative vascular ROS. Anesthesia Plan      MAC     ASA 3       Induction: intravenous. Anesthetic plan and risks discussed with patient. Plan discussed with CRNA. This pre-anesthesia assessment may be used as a history and physical.    DOS STAFF ADDENDUM:    Pt seen and examined, chart reviewed (including anesthesia, drug and allergy history). No interval changes to history and physical examination. Anesthetic plan, risks, benefits, alternatives, and personnel involved discussed with patient. Questions and concerns addressed. Patient(family) verbalized an understanding and agrees to proceed.       Laurie Cade MD  July 6, 2020  6:39 AM

## 2020-07-06 NOTE — PROGRESS NOTES
Tolerating oral intake and being up in chair. Discharge instructions given to patient, and to  per phone. Verbalize understanding.

## 2020-07-06 NOTE — OP NOTE
gI       Colonoscopy Procedure Note      Patient: Maribell Puente  : 1970  Acct#:     Procedure: Colonoscopy    Date:  2020    Surgeon:  Mary Macias MD    Referring Physician:  Orquidea Mena MD    Previous Colonoscopy: YES  Date: 10+ years prior   Greater than 3 years: YES    Preoperative Diagnosis:  1. Screening     Postoperative Diagnosis:  1. Internal hemorrhoids     Consent:  The patient or their legal guardian has signed a consent, and is aware of the potential risks, benefits, alternatives, and potential complications of this procedure. These include, but are not limited to hemorrhage, bleeding, post procedural pain, perforation, phlebitis, aspiration, hypotension, hypoxia, cardiovascular events such as arryhthmia, and possibly death. Additionally, the possibility of missed colonic polyps and interval colon cancer was discussed in the consent. Anesthesia:  The patient was administered IV propofol per anesthesiology team.  Please see their operative records for full details. Procedure: An informed consent was obtained from the patient after explanation of indications, benefits, possible risks and complications of the procedure. The patient was then taken to the endoscopy suite, placed in the left lateral decubitus position, and the above IV anesthesia was administered. A digital rectal examination was performed and revealed negative without mass, lesions or tenderness. The Olympus video colonoscope was placed in the patient's rectum under digital direction and advanced to the cecum. The cecum was identified by characteristic anatomy and ballottment. The preparation was excellent. The ileocecal valve was identified. The terminal ileum was normal     The scope was then withdrawn back through the cecum, ascending, transverse, descending, sigmoid colon, and rectum. Careful circumferential examination of the mucosa in these areas demonstrated:    1.  The entire colon was normal with no polyps, masses, inflammation, or other significant abnormalities. The scope was then withdrawn into the rectum and retroflexed. The retroflexed view of the anal verge and rectum demonstrates internal hemorrhoids. The scope was straightened, the colon was decompressed and the scope was withdrawn from the patient. The patient tolerated the procedure well and was taken to the PACU in good condition. Estimated blood loss: None    Impression:  See post-procedure diagnoses. Recommendations:  1. Recommend repeat colonoscopy in 10 years for screening purposes.      ADIA Yates [address] and Skylar [address]  7/6/2020  [phone]

## 2020-08-19 ENCOUNTER — TELEPHONE (OUTPATIENT)
Dept: FAMILY MEDICINE CLINIC | Age: 50
End: 2020-08-19

## 2020-08-19 NOTE — TELEPHONE ENCOUNTER
----- Message from Michelle Godinez sent at 8/19/2020  4:07 PM EDT -----  Subject: Message to Provider    QUESTIONS  Information for Provider? Pt is having a cough and itchy throat. Pt wants   to know if she can get something prescribed for the cough or if she is   needing an appt.  ---------------------------------------------------------------------------  --------------  CALL BACK INFO  What is the best way for the office to contact you? OK to leave message on   voicemail  Preferred Call Back Phone Number? [phone]  ---------------------------------------------------------------------------  --------------  SCRIPT ANSWERS  Relationship to Patient?  Self

## 2020-08-20 ENCOUNTER — TELEPHONE (OUTPATIENT)
Dept: FAMILY MEDICINE CLINIC | Age: 50
End: 2020-08-20

## 2020-08-20 RX ORDER — PREDNISONE 10 MG/1
10 TABLET ORAL 2 TIMES DAILY
Qty: 10 TABLET | Refills: 0 | Status: SHIPPED | OUTPATIENT
Start: 2020-08-20 | End: 2020-08-25

## 2020-08-20 NOTE — LETTER
[address], [address] [place] [address],[place] [address]  Phone: [phone]  Fax: [phone]    Baldemar Butterfield MD        August 27, 2020    [facility] [address]      Dear Lois Oliva:    RX : Shingrix Vaccine series - per pharmacy    If you have any questions or concerns, please don't hesitate to call.     Sincerely,        Baldemar Butterfield MD

## 2020-08-20 NOTE — TELEPHONE ENCOUNTER
----- Message from Shalini Kade sent at 8/20/2020  4:09 PM EDT -----  Subject: Message to Provider    QUESTIONS  Information for Provider? Patient requesting order for shingles shot sent   to [facility] and medical equipment. Store phone number [phone]   Store fax [phone]   ---------------------------------------------------------------------------  --------------  [address]  What is the best way for the office to contact you? OK to leave message on   voicemail  Preferred Call Back Phone Number? [phone]  ---------------------------------------------------------------------------  --------------  SCRIPT ANSWERS  Relationship to Patient?  Self

## 2020-08-21 ENCOUNTER — TELEPHONE (OUTPATIENT)
Dept: FAMILY MEDICINE CLINIC | Age: 50
End: 2020-08-21

## 2020-08-21 NOTE — TELEPHONE ENCOUNTER
This is not a helpful message. Please hear this as a friendly request for your help with more useful future messages. 1) no rx is needed for shingles vaccine from a pharmacy    2) what medical equipment ? Please try to answer all questions fully when creating messages - rather than generating additional work with unanswered questions.

## 2020-08-26 NOTE — TELEPHONE ENCOUNTER
Pt calling back. States she spoke with pharm and an order is needed for shingles vaccine. Wanting to know if you will send order to [facility] for her. On similar message it was stated that Rx was not needed for shingles vaccine. Pt is going to check with pharm to be sure, but is still requesting to have order for it. Please advise. Please call pt back at [phone].     Message is okay for tomorrow

## 2020-08-26 NOTE — TELEPHONE ENCOUNTER
Attempted to schedule pt for port placement. LM advising pt to contact [phone] to schedule. Prednisone rx'd x 5 days  Itchy throat usually reflects allergy trigger large/soft

## 2020-09-14 ENCOUNTER — OFFICE VISIT (OUTPATIENT)
Dept: PRIMARY CARE CLINIC | Age: 50
End: 2020-09-14
Payer: COMMERCIAL

## 2020-09-14 PROCEDURE — 99211 OFF/OP EST MAY X REQ PHY/QHP: CPT | Performed by: NURSE PRACTITIONER

## 2020-09-14 NOTE — PROGRESS NOTES
Russ Quispe received a viral test for COVID-19. They were educated on isolation and quarantine as appropriate. For any symptoms, they were directed to seek care from their PCP, given contact information to establish with a doctor, directed to an urgent care or the emergency room.

## 2020-09-16 LAB — SARS-COV-2, NAA: NOT DETECTED

## 2020-11-09 ENCOUNTER — OFFICE VISIT (OUTPATIENT)
Dept: PRIMARY CARE CLINIC | Age: 50
End: 2020-11-09
Payer: COMMERCIAL

## 2020-11-09 PROCEDURE — 99211 OFF/OP EST MAY X REQ PHY/QHP: CPT | Performed by: NURSE PRACTITIONER

## 2020-11-09 NOTE — PROGRESS NOTES
Paolo Haile received a viral test for COVID-19. They were educated on isolation and quarantine as appropriate. For any symptoms, they were directed to seek care from their PCP, given contact information to establish with a doctor, directed to an urgent care or the emergency room.

## 2020-11-10 LAB — SARS-COV-2, NAA: DETECTED

## 2020-11-11 ENCOUNTER — TELEPHONE (OUTPATIENT)
Dept: FAMILY MEDICINE CLINIC | Age: 50
End: 2020-11-11

## 2020-11-11 NOTE — TELEPHONE ENCOUNTER
Covid test came back negative, she believes she has a sinus infection. Wanting to know     Symptoms: Cough, itchy throat, low fever, head congestion.

## 2020-11-11 NOTE — TELEPHONE ENCOUNTER
PT called back in regarding message left. Informed PT of the previous note about when to end quarantine and that PT should remain in quarantine until she has been symptom free for 72 hours.

## 2020-11-11 NOTE — TELEPHONE ENCOUNTER
CDC guidelines for isolation in symptomatic patient with assumed COVID-19;        TO END ISOLATION: (Patient understands these guidelines are subject to change)  1. No fever for at least 72 hours without medications AND  2. Symptoms have resolved AND  3.  At least 7 days from initial symptom onset

## 2020-11-11 NOTE — TELEPHONE ENCOUNTER
PT called back in wanting to clarify how long she needs to quarantine for. She would like to know if it's 14 days from when symptoms started or 14 days from the date of the positive results.      Please call PT back: [phone]

## 2020-11-13 ENCOUNTER — TELEPHONE (OUTPATIENT)
Dept: FAMILY MEDICINE CLINIC | Age: 50
End: 2020-11-13

## 2020-11-13 NOTE — TELEPHONE ENCOUNTER
----- Message from Joanna Multani sent at 11/13/2020  2:04 PM EST -----  Subject: Message to Provider    QUESTIONS  Information for Provider? Irene would like to know when she should be   retested for Covid to find out when she is negative. She tested positive   on 11.9.   ---------------------------------------------------------------------------  --------------  CALL BACK INFO  What is the best way for the office to contact you? OK to leave message on   voicemail  Preferred Call Back Phone Number? [phone]  ---------------------------------------------------------------------------  --------------  SCRIPT ANSWERS  Relationship to Patient?  Self

## 2020-12-10 ENCOUNTER — TELEPHONE (OUTPATIENT)
Dept: FAMILY MEDICINE CLINIC | Age: 50
End: 2020-12-10

## 2020-12-10 NOTE — TELEPHONE ENCOUNTER
PT called in stating that the prescription part of her insurance is changing so all medications need to go through [facility]. Pt also asked if her medications can be filled for 90 days rather than 30 days as before.      Best call back number: [phone]

## 2020-12-10 NOTE — TELEPHONE ENCOUNTER
Pharmacy update, mail order is sent for 90days.  When PT call for rerill this will be sent to [facility] for 90 days

## 2020-12-17 RX ORDER — SIMVASTATIN 20 MG
20 TABLET ORAL NIGHTLY
Qty: 90 TABLET | Refills: 3 | Status: SHIPPED | OUTPATIENT
Start: 2020-12-17 | End: 2022-02-14

## 2021-02-11 NOTE — PATIENT INSTRUCTIONS
Low Carb Eating with Intermittent Fasting    target < 100 grams of carb daily; ZERO grained based carbs  Get only carbs from whole fruits - strawberries, raspberries, blackberries, apples, oranges, pineapples, bananas etc.    Intermittent Fasting / Eating Window  Only eat between noon and 8 PM  Water any time  Coffee with cream and no more than 1 teaspoon sugar OK in AM    1) Wheat Belly by Janice Mejia MD ([email or web address])  2) The Primal Blueprint by Veronica Salas ([email or web address] - review success stories)  2) Living Paleo For Dummies

## 2021-02-11 NOTE — PROGRESS NOTES
2021    Sarah Mccall (:  1970) is a 53 y.o. female, here for evaluation of the following chief complaint(s): Annual Exam      ASSESSMENT/PLAN:     Diagnosis Orders   1. Routine general medical examination at a health care facility     2. Encounter for screening mammogram for malignant neoplasm of breast  LILI DIGITAL SCREEN W OR WO CAD BILATERAL    mammo ordered   3. Mixed hyperlipidemia  Comprehensive Metabolic Panel    cmp on statin   4. Major depressive disorder with single episode, in full remission ([facility] [address]      continue sertraline   5. Class 3 severe obesity due to excess calories with serious comorbidity and body mass index (BMI) of 40.0 to 44.9 in [facility])      again advised LC/IF vs bariatric consult; sampled Ozempic 0.25 -->0.5       Return in about 6 months (around 2021) for Hyperlipidemia, Obesity. An electronic signature was used to authenticate this note. @SIG@    SUBJECTIVE/OBJECTIVE:    HPI / ROS  # Preventive and other issues  # screen breast cancer - options discussed with patient  mammo ordered due July    # Lipids - recent screening history:  Lab Results   Component Value Date    LDLCALC 104 (H) 06/10/2020     Lab Results   Component Value Date    TRIG 240 (H) 06/10/2020     Lab Results   Component Value Date    HDL 52 06/10/2020     Lab Results   Component Value Date    CHOL 204 (H) 06/10/2020     # Obesity reviewed with pt lower carb diet with carb targets to help with insulin effects on weight storage; resources advised    # Depression - denies SI. OK on current med(s) per patient.   Cont sertraline reviewed IF effects on mod disorder            Wt Readings from Last 3 Encounters:   21 (!) 305 lb (138.3 kg)   20 (!) 301 lb 11.2 oz (136.8 kg)   06/10/20 (!) 306 lb (138.8 kg)       BP Readings from Last 3 Encounters:   21 118/72   20 106/62   20 123/69       PHYSICAL EXAM  Vitals:    21 0817   BP: 118/72   Pulse: 98   Temp: 97.1 °F (36.2 °C)   SpO2: 97%   Weight: (!) 305 lb (138.3 kg)     A&o  Neck no TMG no bruit  Car reg no MGR  Lungs cta  abd obese    Ext no edema  Skin no jaundice  Eyes anicteric

## 2021-02-12 ENCOUNTER — OFFICE VISIT (OUTPATIENT)
Dept: FAMILY MEDICINE CLINIC | Age: 51
End: 2021-02-12
Payer: COMMERCIAL

## 2021-02-12 VITALS
BODY MASS INDEX: 42.54 KG/M2 | HEART RATE: 98 BPM | TEMPERATURE: 97.1 F | OXYGEN SATURATION: 97 % | WEIGHT: 293 LBS | DIASTOLIC BLOOD PRESSURE: 72 MMHG | SYSTOLIC BLOOD PRESSURE: 118 MMHG

## 2021-02-12 DIAGNOSIS — Z12.31 ENCOUNTER FOR SCREENING MAMMOGRAM FOR MALIGNANT NEOPLASM OF BREAST: ICD-10-CM

## 2021-02-12 DIAGNOSIS — E78.2 MIXED HYPERLIPIDEMIA: ICD-10-CM

## 2021-02-12 DIAGNOSIS — Z00.00 ROUTINE GENERAL MEDICAL EXAMINATION AT A HEALTH CARE FACILITY: Primary | ICD-10-CM

## 2021-02-12 DIAGNOSIS — E66.01 CLASS 3 SEVERE OBESITY DUE TO EXCESS CALORIES WITH SERIOUS COMORBIDITY AND BODY MASS INDEX (BMI) OF 40.0 TO 44.9 IN ADULT (HCC): ICD-10-CM

## 2021-02-12 DIAGNOSIS — F32.5 MAJOR DEPRESSIVE DISORDER WITH SINGLE EPISODE, IN FULL REMISSION (HCC): ICD-10-CM

## 2021-02-12 LAB
A/G RATIO: 1.4 (ref 1.1–2.2)
ALBUMIN SERPL-MCNC: 4.7 G/DL (ref 3.4–5)
ALP BLD-CCNC: 70 U/L (ref 40–129)
ALT SERPL-CCNC: 22 U/L (ref 10–40)
ANION GAP SERPL CALCULATED.3IONS-SCNC: 12 MMOL/L (ref 3–16)
AST SERPL-CCNC: 24 U/L (ref 15–37)
BILIRUB SERPL-MCNC: 1.9 MG/DL (ref 0–1)
BUN BLDV-MCNC: 15 MG/DL (ref 7–20)
CALCIUM SERPL-MCNC: 10.1 MG/DL (ref 8.3–10.6)
CHLORIDE BLD-SCNC: 101 MMOL/L (ref 99–110)
CO2: 24 MMOL/L (ref 21–32)
CREAT SERPL-MCNC: 0.7 MG/DL (ref 0.6–1.1)
GFR AFRICAN AMERICAN: >60
GFR NON-AFRICAN AMERICAN: >60
GLOBULIN: 3.3 G/DL
GLUCOSE BLD-MCNC: 102 MG/DL (ref 70–99)
POTASSIUM SERPL-SCNC: 4.4 MMOL/L (ref 3.5–5.1)
SODIUM BLD-SCNC: 137 MMOL/L (ref 136–145)
TOTAL PROTEIN: 8 G/DL (ref 6.4–8.2)

## 2021-02-12 PROCEDURE — 99396 PREV VISIT EST AGE 40-64: CPT | Performed by: FAMILY MEDICINE

## 2021-02-12 PROCEDURE — 36415 COLL VENOUS BLD VENIPUNCTURE: CPT | Performed by: FAMILY MEDICINE

## 2021-02-12 RX ORDER — ASPIRIN 81 MG/1
81 TABLET, CHEWABLE ORAL DAILY
COMMUNITY

## 2021-04-05 ENCOUNTER — TELEPHONE (OUTPATIENT)
Dept: FAMILY MEDICINE CLINIC | Age: 51
End: 2021-04-05

## 2021-04-05 NOTE — TELEPHONE ENCOUNTER
I would prefer she get her COVID vacines' done first and return to steroid injection 2 weeks after vaccination done, as there may be some interference

## 2021-04-05 NOTE — TELEPHONE ENCOUNTER
PT called in stating that she is coming in this afternoon for a cortisone shot but she is scheduled to have her COVID vaccine this Thursday. Pt would like to know if she will still be able to have this injection today then receive the vaccine later this week.      Please call back to adv: [phone]

## 2021-04-30 ENCOUNTER — PROCEDURE VISIT (OUTPATIENT)
Dept: FAMILY MEDICINE CLINIC | Age: 51
End: 2021-04-30
Payer: COMMERCIAL

## 2021-04-30 VITALS
OXYGEN SATURATION: 97 % | HEART RATE: 73 BPM | DIASTOLIC BLOOD PRESSURE: 84 MMHG | BODY MASS INDEX: 41.84 KG/M2 | SYSTOLIC BLOOD PRESSURE: 124 MMHG | WEIGHT: 293 LBS | RESPIRATION RATE: 15 BRPM

## 2021-04-30 DIAGNOSIS — M17.11 PRIMARY OSTEOARTHRITIS OF RIGHT KNEE: Primary | ICD-10-CM

## 2021-04-30 PROCEDURE — 20610 DRAIN/INJ JOINT/BURSA W/O US: CPT | Performed by: FAMILY MEDICINE

## 2021-04-30 RX ORDER — METHYLPREDNISOLONE ACETATE 80 MG/ML
80 INJECTION, SUSPENSION INTRA-ARTICULAR; INTRALESIONAL; INTRAMUSCULAR; SOFT TISSUE ONCE
Status: COMPLETED | OUTPATIENT
Start: 2021-04-30 | End: 2021-04-30

## 2021-04-30 RX ADMIN — METHYLPREDNISOLONE ACETATE 80 MG: 80 INJECTION, SUSPENSION INTRA-ARTICULAR; INTRALESIONAL; INTRAMUSCULAR; SOFT TISSUE at 11:43

## 2021-04-30 SDOH — ECONOMIC STABILITY: TRANSPORTATION INSECURITY
IN THE PAST 12 MONTHS, HAS LACK OF TRANSPORTATION KEPT YOU FROM MEETINGS, WORK, OR FROM GETTING THINGS NEEDED FOR DAILY LIVING?: NO

## 2021-04-30 SDOH — ECONOMIC STABILITY: INCOME INSECURITY: HOW HARD IS IT FOR YOU TO PAY FOR THE VERY BASICS LIKE FOOD, HOUSING, MEDICAL CARE, AND HEATING?: NOT ASKED

## 2021-04-30 NOTE — PROGRESS NOTES
2021    Angelina Vazquez (:  1970) is a 53 y.o. female, here for evaluation of the following chief complaint(s):  Knee Pain (R knee injection )      ASSESSMENT/PLAN:     Diagnosis Orders   1. Primary osteoarthritis of right knee  MD ARTHROCENTESIS ASPIR&/INJ MAJOR JT/BURSA W/O US    methylPREDNISolone acetate (DEPO-MEDROL) injection 80 mg    injected as below       No follow-ups on file. An electronic signature was used to authenticate this note. @SIG@    SUBJECTIVE/OBJECTIVE:  (NOTE : prior results listed below reviewed at this visit to assist in medical decision making.)    HPI / ROS    # OA R knee requests repeat injections has benefitted in past        Wt Readings from Last 3 Encounters:   21 300 lb (136.1 kg)   21 (!) 305 lb (138.3 kg)   20 (!) 301 lb 11.2 oz (136.8 kg)       BP Readings from Last 3 Encounters:   21 124/84   21 118/72   20 106/62     right knee : After review of risks and benefits of injection, including infection, bleeding, pain, and need to treat, as well as possible improvement in pain and function, patient agreed to proceed with injection. Using sterile technique and an anterior approach, I injected 80 mg of depo-medrol with 2 cc of local anesthetic. The procedure was well tolerated by the patient. The injection site was cleaned and dressed with a band-aid. Signs and symptoms of infection were reviewed with the patient, including pain, redness, swelling, and warmth. Patient was instructed to contact me immediately for any of these signs or for a lack of improvement, and voices understanding and willingness to do so.

## 2021-05-03 ENCOUNTER — NURSE ONLY (OUTPATIENT)
Dept: FAMILY MEDICINE CLINIC | Age: 51
End: 2021-05-03
Payer: COMMERCIAL

## 2021-05-03 ENCOUNTER — TELEPHONE (OUTPATIENT)
Dept: ADMINISTRATIVE | Age: 51
End: 2021-05-03

## 2021-05-03 DIAGNOSIS — E66.01 MORBID OBESITY WITH BMI OF 40.0-44.9, ADULT (HCC): Primary | ICD-10-CM

## 2021-05-03 DIAGNOSIS — E88.81 METABOLIC SYNDROME: Primary | ICD-10-CM

## 2021-05-03 DIAGNOSIS — Z13.1 SCREENING FOR DIABETES MELLITUS (DM): ICD-10-CM

## 2021-05-03 LAB — HBA1C MFR BLD: 5.1 %

## 2021-05-03 PROCEDURE — 83036 HEMOGLOBIN GLYCOSYLATED A1C: CPT | Performed by: FAMILY MEDICINE

## 2021-05-03 NOTE — TELEPHONE ENCOUNTER
PT called in regarding the PA needed for this medication. PT will also be in this afternoon to have her A1C checked as well.

## 2021-05-04 ENCOUNTER — TELEPHONE (OUTPATIENT)
Dept: FAMILY MEDICINE CLINIC | Age: 51
End: 2021-05-04

## 2021-05-05 NOTE — TELEPHONE ENCOUNTER
Received a DENIAL for Ozempic (1 MG/DOSE) 2MG/1.5ML pen-injectors. Letter attached. Please notify patient, thank you.

## 2021-05-10 ENCOUNTER — TELEPHONE (OUTPATIENT)
Dept: FAMILY MEDICINE CLINIC | Age: 51
End: 2021-05-10

## 2021-05-10 DIAGNOSIS — R05.9 COUGH: Primary | ICD-10-CM

## 2021-05-10 RX ORDER — AZITHROMYCIN 250 MG/1
TABLET, FILM COATED ORAL
Qty: 1 PACKET | Refills: 0 | Status: SHIPPED | OUTPATIENT
Start: 2021-05-10 | End: 2021-05-20

## 2021-05-10 NOTE — TELEPHONE ENCOUNTER
PT informed that abx was sent to pharmacy    PT will call # provided by Dr Christopher Castillo.  PT states she will reach out and see if this medication is covered, as Ozempic, was not, and let our office know

## 2021-06-25 ENCOUNTER — HOSPITAL ENCOUNTER (OUTPATIENT)
Dept: WOMENS IMAGING | Age: 51
Discharge: HOME | End: 2021-06-25
Payer: COMMERCIAL

## 2021-06-25 DIAGNOSIS — Z12.31 BREAST CANCER SCREENING BY MAMMOGRAM: ICD-10-CM

## 2021-06-25 PROCEDURE — 77063 BREAST TOMOSYNTHESIS BI: CPT

## 2021-08-19 ENCOUNTER — TELEPHONE (OUTPATIENT)
Dept: FAMILY MEDICINE CLINIC | Age: 51
End: 2021-08-19

## 2021-08-19 NOTE — TELEPHONE ENCOUNTER
----- Message from Rufus Melendez sent at 8/19/2021 12:22 PM EDT -----  Subject: Message to Provider    QUESTIONS  Information for Provider? Pt would like to know if she can come in to get   some more samples of Ozempic, please advise   ---------------------------------------------------------------------------  --------------  CALL BACK INFO  What is the best way for the office to contact you? OK to leave message on   voicemail  Preferred Call Back Phone Number? [phone]  ---------------------------------------------------------------------------  --------------  SCRIPT ANSWERS  Relationship to Patient?  Self

## 2021-08-31 ENCOUNTER — TELEPHONE (OUTPATIENT)
Dept: FAMILY MEDICINE CLINIC | Age: 51
End: 2021-08-31

## 2021-08-31 NOTE — TELEPHONE ENCOUNTER
Pt called in stating she was informed by her HR dept. 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 Last Thursday. Pt has  Been vaccinated and states she has no symptoms but is wondering if she should be tested anyway. Her concern is she is supposed to travel the weekend of sept 11th to see her grand babies.  Pt is reachable at [phone]

## 2021-08-31 NOTE — TELEPHONE ENCOUNTER
She can get tested if she wants but 10 day quarantine after exposure will be up prior to her  Departure 11 SEP. Has her HR department asked her to quarantine ?

## 2021-10-07 PROBLEM — E88.81 METABOLIC SYNDROME: Status: ACTIVE | Noted: 2021-10-07

## 2021-10-07 PROBLEM — E88.810 METABOLIC SYNDROME: Status: ACTIVE | Noted: 2021-10-07

## 2021-10-07 NOTE — PATIENT INSTRUCTIONS
Low Carb Eating with Intermittent Fasting    target < 100 grams of carb daily; ZERO grained based carbs  Get only carbs from whole fruits - strawberries, raspberries, blackberries, apples, oranges, pineapples, bananas etc.    Intermittent Fasting (\"I. F. \") / Eating Window   Only eat between noon and 8 PM  Water any time  Coffee with cream and no more than 1 teaspoon sugar OK in AM    Google/ YouTube AUTOPHAGY - a primary benefit of IF    Other helpful books and websites  1) Wheat Belly by Nehemiah Joshi MD (www.[employer]. [employer])  2) The Primal Blueprint by Estiven Corona (www.GI Track - review success stories)  2) Living Paleo For Dummies

## 2021-10-07 NOTE — PROGRESS NOTES
10/8/2021    Jyoti Fulton (:  1970) is a 49 y.o. female, here for evaluation of the following chief complaint(s):  Check-Up (Check up)      ASSESSMENT/PLAN:     Diagnosis Orders   1. Routine general medical examination at a health care facility     2. Encounter for screening mammogram for malignant neoplasm of breast      mammo reviewed   3. Mixed hyperlipidemia      cmp lipids on statin are reviwed ([facility] brought in)   4. Major depressive disorder with single episode, in full remission ([facility]      ok sertraline continue   5. Morbid obesity with BMI of 40.0-44.9, adult ([facility]      LCIF advised   6. Metabolic syndrome      bs == 93   7. Needs flu shot  INFLUENZA, MDCK QUADV, 2 YRS AND OLDER, IM, PF, PREFILL SYR OR SDV, 0.5ML (FLUCELVAX QUADV, PF)       Return in about 6 months (around 2022) for Hyperlipidemia, Depression, Metabolic syndrome, Obesity. An electronic signature was used to authenticate this note. @SIG@    SUBJECTIVE/OBJECTIVE:  (NOTE : prior results listed below reviewed at this visit to assist in medical decision making.)    HPI / ROS    # Preventive and other issues    # screen breast cancer - screening status discussed with patient; reviewed today prior mammo dated 2021 ACR 1 next 1 year    # screen colon cancer - screening status discussed with patient; reviewed today prior testing dated 2020 Dr. Tomás More no polyps recall 10 years next     # screen cervical cancer - screening status discussed with patient reminded sees Dr. Serena Grimaldo    # Hyperlipidemia on statin amaya this no myalgias or weakness LIPIDS reviewed done at work  LFTs reviwed    Lab Results   Component Value Date    LDLCALC 104 (H) 06/10/2020    LDLDIRECT 122 (H) 10/10/2019      Lab Results   Component Value Date    ALT 22 2021    AST 24 2021    ALKPHOS 70 2021    BILITOT 1.9 (H) 2021        # Depression - denies SI. OK on current med(s) per patient.     # Obesity - Reviewed prior weights as follows :     Wt Readings from Last 3 Encounters:   10/08/21 298 lb (135.2 kg)   04/30/21 300 lb (136.1 kg)   02/12/21 (!) 305 lb (138.3 kg)     Reviewed with pt lower carb diet with carb targets to help with insulin effects on weight storage; resources advised    # Metabolic Syndrome - fasting BS 93 today and reviewed need for lower carb dieting  Lab Results   Component Value Date    LABA1C 5.1 05/03/2021     Lab Results   Component Value Date    .2 07/31/2015               Wt Readings from Last 3 Encounters:   10/08/21 298 lb (135.2 kg)   04/30/21 300 lb (136.1 kg)   02/12/21 (!) 305 lb (138.3 kg)       BP Readings from Last 3 Encounters:   10/08/21 (!) 120/94   04/30/21 124/84   02/12/21 118/72       PHYSICAL EXAM  Vitals:    10/08/21 0910   BP: (!) 120/94   Pulse: 76   Temp: 98.1 °F (36.7 °C)   Weight: 298 lb (135.2 kg)     A&o  Neck no TMG no bruit  Car reg no MGR  Lungs cta  Ext no edema  Skin no jaundice  Eyes anicteric

## 2021-10-08 ENCOUNTER — OFFICE VISIT (OUTPATIENT)
Dept: FAMILY MEDICINE CLINIC | Age: 51
End: 2021-10-08
Payer: COMMERCIAL

## 2021-10-08 VITALS
HEART RATE: 76 BPM | TEMPERATURE: 98.1 F | SYSTOLIC BLOOD PRESSURE: 120 MMHG | BODY MASS INDEX: 41.56 KG/M2 | DIASTOLIC BLOOD PRESSURE: 94 MMHG | WEIGHT: 293 LBS

## 2021-10-08 DIAGNOSIS — Z12.31 ENCOUNTER FOR SCREENING MAMMOGRAM FOR MALIGNANT NEOPLASM OF BREAST: ICD-10-CM

## 2021-10-08 DIAGNOSIS — E88.81 METABOLIC SYNDROME: ICD-10-CM

## 2021-10-08 DIAGNOSIS — F32.5 MAJOR DEPRESSIVE DISORDER WITH SINGLE EPISODE, IN FULL REMISSION (HCC): ICD-10-CM

## 2021-10-08 DIAGNOSIS — E78.2 MIXED HYPERLIPIDEMIA: ICD-10-CM

## 2021-10-08 DIAGNOSIS — E66.01 MORBID OBESITY WITH BMI OF 40.0-44.9, ADULT (HCC): ICD-10-CM

## 2021-10-08 DIAGNOSIS — Z23 NEEDS FLU SHOT: ICD-10-CM

## 2021-10-08 DIAGNOSIS — Z00.00 ROUTINE GENERAL MEDICAL EXAMINATION AT A HEALTH CARE FACILITY: Primary | ICD-10-CM

## 2021-10-08 PROCEDURE — 90471 IMMUNIZATION ADMIN: CPT | Performed by: FAMILY MEDICINE

## 2021-10-08 PROCEDURE — 99396 PREV VISIT EST AGE 40-64: CPT | Performed by: FAMILY MEDICINE

## 2021-10-08 PROCEDURE — 90674 CCIIV4 VAC NO PRSV 0.5 ML IM: CPT | Performed by: FAMILY MEDICINE

## 2021-11-01 ENCOUNTER — TELEPHONE (OUTPATIENT)
Dept: FAMILY MEDICINE CLINIC | Age: 51
End: 2021-11-01

## 2021-11-01 NOTE — TELEPHONE ENCOUNTER
Seen couple weeks ago. Advised to call office to see if any samples of ozempic were available. Please advise.  Please call pt back at [phone]

## 2021-11-03 ENCOUNTER — TELEPHONE (OUTPATIENT)
Dept: FAMILY MEDICINE CLINIC | Age: 51
End: 2021-11-03

## 2021-11-03 NOTE — TELEPHONE ENCOUNTER
PT called in regarding the [address] booster vaccine. PT says that she had her 2nd vaccine back in April and her flu shot in October and wanted to know if Dr. Maine Rosas would recommend her getting the booster and if enough time has lapsed between when she got her flu shot to when she could get her booster scheduled.      Please call back: [phone]

## 2021-11-04 NOTE — TELEPHONE ENCOUNTER
Called and advised Pt of Dr. Márquez Score answers and she said thank you, she will get it next week

## 2021-11-15 ENCOUNTER — CLINICAL DOCUMENTATION (OUTPATIENT)
Dept: OTHER | Age: 51
End: 2021-11-15

## 2022-02-07 ENCOUNTER — TELEPHONE (OUTPATIENT)
Dept: FAMILY MEDICINE CLINIC | Age: 52
End: 2022-02-07

## 2022-02-07 NOTE — TELEPHONE ENCOUNTER
Pt is calling to get some samples of the ozempic. States that her insurance is not approving it and blu tells her just to call when she needs some. Please advise.        ozempic 0.50

## 2022-02-09 ENCOUNTER — TELEPHONE (OUTPATIENT)
Dept: FAMILY MEDICINE CLINIC | Age: 52
End: 2022-02-09

## 2022-02-09 DIAGNOSIS — E66.01 MORBID OBESITY WITH BMI OF 40.0-44.9, ADULT (HCC): Primary | ICD-10-CM

## 2022-02-09 NOTE — TELEPHONE ENCOUNTER
Pt spoke with pharm, and truliicity is covered by insurance, all but 20 %. Was advised able to print copay card to help cover balance that is not covered. Wanting to know if will call med into [facility] pharm in [place].    Pt is reachable at [phone]

## 2022-02-11 ENCOUNTER — TELEPHONE (OUTPATIENT)
Dept: ADMINISTRATIVE | Age: 52
End: 2022-02-11

## 2022-02-12 DIAGNOSIS — F32.89 OTHER DEPRESSION: ICD-10-CM

## 2022-02-12 DIAGNOSIS — E78.2 MIXED HYPERLIPIDEMIA: ICD-10-CM

## 2022-02-14 RX ORDER — SIMVASTATIN 20 MG
TABLET ORAL
Qty: 90 TABLET | Refills: 3 | Status: ON HOLD | OUTPATIENT
Start: 2022-02-14 | End: 2022-02-25 | Stop reason: HOSPADM

## 2022-02-22 ENCOUNTER — TELEPHONE (OUTPATIENT)
Dept: ORTHOPEDIC SURGERY | Age: 52
End: 2022-02-22

## 2022-02-23 ENCOUNTER — APPOINTMENT (OUTPATIENT)
Dept: GENERAL RADIOLOGY | Age: 52
DRG: 065 | End: 2022-02-23
Payer: COMMERCIAL

## 2022-02-23 ENCOUNTER — APPOINTMENT (OUTPATIENT)
Dept: CT IMAGING | Age: 52
DRG: 065 | End: 2022-02-23
Payer: COMMERCIAL

## 2022-02-23 ENCOUNTER — HOSPITAL ENCOUNTER (INPATIENT)
Age: 52
LOS: 2 days | Discharge: HOME | DRG: 065 | End: 2022-02-25
Attending: STUDENT IN AN ORGANIZED HEALTH CARE EDUCATION/TRAINING PROGRAM | Admitting: INTERNAL MEDICINE
Payer: COMMERCIAL

## 2022-02-23 ENCOUNTER — TELEPHONE (OUTPATIENT)
Dept: FAMILY MEDICINE CLINIC | Age: 52
End: 2022-02-23

## 2022-02-23 DIAGNOSIS — I63.9 CEREBROVASCULAR ACCIDENT (CVA), UNSPECIFIED MECHANISM (HCC): Primary | ICD-10-CM

## 2022-02-23 PROBLEM — R20.0 NUMBNESS: Status: ACTIVE | Noted: 2022-02-23

## 2022-02-23 LAB
A/G RATIO: 1.4 (ref 1.1–2.2)
ALBUMIN SERPL-MCNC: 4.3 G/DL (ref 3.4–5)
ALP BLD-CCNC: 69 U/L (ref 40–129)
ALT SERPL-CCNC: 16 U/L (ref 10–40)
ANION GAP SERPL CALCULATED.3IONS-SCNC: 11 MMOL/L (ref 3–16)
APTT: 34.9 SEC (ref 26.2–38.6)
AST SERPL-CCNC: 20 U/L (ref 15–37)
BASOPHILS ABSOLUTE: 0.1 K/UL (ref 0–0.2)
BASOPHILS RELATIVE PERCENT: 0.9 %
BILIRUB SERPL-MCNC: 1.8 MG/DL (ref 0–1)
BILIRUBIN URINE: NEGATIVE
BLOOD, URINE: NEGATIVE
BUN BLDV-MCNC: 10 MG/DL (ref 7–20)
CALCIUM SERPL-MCNC: 9.3 MG/DL (ref 8.3–10.6)
CHLORIDE BLD-SCNC: 104 MMOL/L (ref 99–110)
CLARITY: CLEAR
CO2: 23 MMOL/L (ref 21–32)
COLOR: YELLOW
CREAT SERPL-MCNC: 0.6 MG/DL (ref 0.6–1.1)
EOSINOPHILS ABSOLUTE: 0.2 K/UL (ref 0–0.6)
EOSINOPHILS RELATIVE PERCENT: 2.5 %
GFR AFRICAN AMERICAN: >60
GFR NON-AFRICAN AMERICAN: >60
GLUCOSE BLD-MCNC: 102 MG/DL (ref 70–99)
GLUCOSE URINE: NEGATIVE MG/DL
HCT VFR BLD CALC: 40.6 % (ref 36–48)
HEMOGLOBIN: 13.8 G/DL (ref 12–16)
INR BLD: 0.98 (ref 0.88–1.12)
KETONES, URINE: ABNORMAL MG/DL
LEUKOCYTE ESTERASE, URINE: NEGATIVE
LYMPHOCYTES ABSOLUTE: 2.2 K/UL (ref 1–5.1)
LYMPHOCYTES RELATIVE PERCENT: 29.3 %
MCH RBC QN AUTO: 29.4 PG (ref 26–34)
MCHC RBC AUTO-ENTMCNC: 33.9 G/DL (ref 31–36)
MCV RBC AUTO: 86.7 FL (ref 80–100)
MICROSCOPIC EXAMINATION: ABNORMAL
MONOCYTES ABSOLUTE: 0.5 K/UL (ref 0–1.3)
MONOCYTES RELATIVE PERCENT: 7.1 %
NEUTROPHILS ABSOLUTE: 4.5 K/UL (ref 1.7–7.7)
NEUTROPHILS RELATIVE PERCENT: 60.2 %
NITRITE, URINE: NEGATIVE
PDW BLD-RTO: 14.3 % (ref 12.4–15.4)
PH UA: 5.5 (ref 5–8)
PLATELET # BLD: 307 K/UL (ref 135–450)
PMV BLD AUTO: 8.3 FL (ref 5–10.5)
POTASSIUM REFLEX MAGNESIUM: 4 MMOL/L (ref 3.5–5.1)
PROTEIN UA: NEGATIVE MG/DL
PROTHROMBIN TIME: 11.1 SEC (ref 9.9–12.7)
RBC # BLD: 4.68 M/UL (ref 4–5.2)
SODIUM BLD-SCNC: 138 MMOL/L (ref 136–145)
SPECIFIC GRAVITY UA: >1.03 (ref 1–1.03)
TOTAL PROTEIN: 7.3 G/DL (ref 6.4–8.2)
TROPONIN: <0.01 NG/ML
URINE REFLEX TO CULTURE: ABNORMAL
URINE TYPE: ABNORMAL
UROBILINOGEN, URINE: 0.2 E.U./DL
WBC # BLD: 7.4 K/UL (ref 4–11)

## 2022-02-23 PROCEDURE — 81003 URINALYSIS AUTO W/O SCOPE: CPT

## 2022-02-23 PROCEDURE — 2580000003 HC RX 258: Performed by: INTERNAL MEDICINE

## 2022-02-23 PROCEDURE — 6370000000 HC RX 637 (ALT 250 FOR IP): Performed by: INTERNAL MEDICINE

## 2022-02-23 PROCEDURE — 99284 EMERGENCY DEPT VISIT MOD MDM: CPT

## 2022-02-23 PROCEDURE — 70450 CT HEAD/BRAIN W/O DYE: CPT

## 2022-02-23 PROCEDURE — 84484 ASSAY OF TROPONIN QUANT: CPT

## 2022-02-23 PROCEDURE — 6360000004 HC RX CONTRAST MEDICATION: Performed by: STUDENT IN AN ORGANIZED HEALTH CARE EDUCATION/TRAINING PROGRAM

## 2022-02-23 PROCEDURE — 93010 ELECTROCARDIOGRAM REPORT: CPT | Performed by: INTERNAL MEDICINE

## 2022-02-23 PROCEDURE — 85610 PROTHROMBIN TIME: CPT

## 2022-02-23 PROCEDURE — 85730 THROMBOPLASTIN TIME PARTIAL: CPT

## 2022-02-23 PROCEDURE — 71045 X-RAY EXAM CHEST 1 VIEW: CPT

## 2022-02-23 PROCEDURE — 70498 CT ANGIOGRAPHY NECK: CPT

## 2022-02-23 PROCEDURE — 36415 COLL VENOUS BLD VENIPUNCTURE: CPT

## 2022-02-23 PROCEDURE — 80053 COMPREHEN METABOLIC PANEL: CPT

## 2022-02-23 PROCEDURE — 85025 COMPLETE CBC W/AUTO DIFF WBC: CPT

## 2022-02-23 PROCEDURE — 2060000000 HC ICU INTERMEDIATE R&B

## 2022-02-23 PROCEDURE — 93005 ELECTROCARDIOGRAM TRACING: CPT | Performed by: STUDENT IN AN ORGANIZED HEALTH CARE EDUCATION/TRAINING PROGRAM

## 2022-02-23 RX ORDER — ONDANSETRON 2 MG/ML
4 INJECTION INTRAMUSCULAR; INTRAVENOUS EVERY 6 HOURS PRN
Status: DISCONTINUED | OUTPATIENT
Start: 2022-02-23 | End: 2022-02-25 | Stop reason: HOSPADM

## 2022-02-23 RX ORDER — ATORVASTATIN CALCIUM 20 MG/1
20 TABLET, FILM COATED ORAL DAILY
Status: DISCONTINUED | OUTPATIENT
Start: 2022-02-23 | End: 2022-02-23

## 2022-02-23 RX ORDER — ATORVASTATIN CALCIUM 40 MG/1
40 TABLET, FILM COATED ORAL NIGHTLY
Status: DISCONTINUED | OUTPATIENT
Start: 2022-02-23 | End: 2022-02-25 | Stop reason: HOSPADM

## 2022-02-23 RX ORDER — ASPIRIN 300 MG/1
300 SUPPOSITORY RECTAL DAILY
Status: DISCONTINUED | OUTPATIENT
Start: 2022-02-23 | End: 2022-02-25 | Stop reason: HOSPADM

## 2022-02-23 RX ORDER — SODIUM CHLORIDE 9 MG/ML
25 INJECTION, SOLUTION INTRAVENOUS PRN
Status: DISCONTINUED | OUTPATIENT
Start: 2022-02-23 | End: 2022-02-25 | Stop reason: HOSPADM

## 2022-02-23 RX ORDER — LANOLIN ALCOHOL/MO/W.PET/CERES
3 CREAM (GRAM) TOPICAL NIGHTLY PRN
COMMUNITY

## 2022-02-23 RX ORDER — ASPIRIN 81 MG/1
81 TABLET, CHEWABLE ORAL DAILY
Status: DISCONTINUED | OUTPATIENT
Start: 2022-02-23 | End: 2022-02-23

## 2022-02-23 RX ORDER — LANOLIN ALCOHOL/MO/W.PET/CERES
3 CREAM (GRAM) TOPICAL NIGHTLY PRN
Status: DISCONTINUED | OUTPATIENT
Start: 2022-02-23 | End: 2022-02-25 | Stop reason: HOSPADM

## 2022-02-23 RX ORDER — SERTRALINE HYDROCHLORIDE 25 MG/1
25 TABLET, FILM COATED ORAL DAILY
Status: DISCONTINUED | OUTPATIENT
Start: 2022-02-23 | End: 2022-02-25 | Stop reason: HOSPADM

## 2022-02-23 RX ORDER — ACETAMINOPHEN 325 MG/1
650 TABLET ORAL EVERY 6 HOURS PRN
Status: DISCONTINUED | OUTPATIENT
Start: 2022-02-23 | End: 2022-02-25 | Stop reason: HOSPADM

## 2022-02-23 RX ORDER — SODIUM CHLORIDE 0.9 % (FLUSH) 0.9 %
10 SYRINGE (ML) INJECTION PRN
Status: DISCONTINUED | OUTPATIENT
Start: 2022-02-23 | End: 2022-02-25 | Stop reason: HOSPADM

## 2022-02-23 RX ORDER — ASPIRIN 81 MG/1
81 TABLET ORAL DAILY
Status: DISCONTINUED | OUTPATIENT
Start: 2022-02-23 | End: 2022-02-25 | Stop reason: HOSPADM

## 2022-02-23 RX ORDER — SODIUM CHLORIDE 0.9 % (FLUSH) 0.9 %
10 SYRINGE (ML) INJECTION EVERY 12 HOURS SCHEDULED
Status: DISCONTINUED | OUTPATIENT
Start: 2022-02-23 | End: 2022-02-25 | Stop reason: HOSPADM

## 2022-02-23 RX ORDER — ONDANSETRON 4 MG/1
4 TABLET, ORALLY DISINTEGRATING ORAL EVERY 8 HOURS PRN
Status: DISCONTINUED | OUTPATIENT
Start: 2022-02-23 | End: 2022-02-25 | Stop reason: HOSPADM

## 2022-02-23 RX ADMIN — ASPIRIN 81 MG: 81 TABLET, COATED ORAL at 21:47

## 2022-02-23 RX ADMIN — IOPAMIDOL 75 ML: 755 INJECTION, SOLUTION INTRAVENOUS at 16:11

## 2022-02-23 RX ADMIN — SERTRALINE 25 MG: 25 TABLET, FILM COATED ORAL at 21:47

## 2022-02-23 RX ADMIN — ACETAMINOPHEN 650 MG: 325 TABLET ORAL at 19:17

## 2022-02-23 RX ADMIN — ATORVASTATIN CALCIUM 40 MG: 40 TABLET, FILM COATED ORAL at 21:47

## 2022-02-23 RX ADMIN — Medication 10 ML: at 21:48

## 2022-02-23 ASSESSMENT — PAIN SCALES - GENERAL
PAINLEVEL_OUTOF10: 0
PAINLEVEL_OUTOF10: 5
PAINLEVEL_OUTOF10: 5
PAINLEVEL_OUTOF10: 0

## 2022-02-23 ASSESSMENT — PAIN DESCRIPTION - DESCRIPTORS: DESCRIPTORS: ACHING

## 2022-02-23 ASSESSMENT — PAIN - FUNCTIONAL ASSESSMENT: PAIN_FUNCTIONAL_ASSESSMENT: 0-10

## 2022-02-23 ASSESSMENT — PAIN DESCRIPTION - LOCATION: LOCATION: HEAD

## 2022-02-23 ASSESSMENT — PAIN DESCRIPTION - PAIN TYPE: TYPE: ACUTE PAIN

## 2022-02-23 NOTE — ED PROVIDER NOTES
Resource Strain:     Difficulty of Paying Living Expenses: Not on file   Food Insecurity: No Food Insecurity    Worried About Running Out of Food in the Last Year: Never true    Ran Out of Food in the Last Year: Never true   Transportation Needs: No Transportation Needs    Lack of Transportation (Medical): No    Lack of Transportation (Non-Medical): No   Physical Activity:     Days of Exercise per Week: Not on file    Minutes of Exercise per Session: Not on file   Stress:     Feeling of Stress : Not on file   Social Connections:     Frequency of Communication with Friends and Family: Not on file    Frequency of Social Gatherings with Friends and Family: Not on file    Attends [employer] Services: Not on file    Active Member of [address] [place] [address] [employer] or Organizations: Not on file    Attends Club or Organization Meetings: Not on file    Marital Status: Not on file   Intimate Partner Violence:     Fear of Current or Ex-Partner: Not on file    Emotionally Abused: Not on file    Physically Abused: Not on file    Sexually Abused: Not on file   Housing Stability:     Unable to Pay for Housing in the Last Year: Not on file    Number of [place] in the Last Year: Not on file    Unstable Housing in the Last Year: Not on file        Review of Systems   10 total systems reviewed and found to be negative unless otherwise noted in HPI     Physical Exam   BP (!) 183/73   Pulse 64   Temp 98 °F (36.7 °C) (Oral)   Resp 16   Ht 5' 10\" (1.778 m)   Wt (!) 300 lb 11.3 oz (136.4 kg)   SpO2 94%   BMI 43.15 kg/m²      CONSTITUTIONAL: Well appearing, in no acute distress   HEAD: atraumatic, normocephalic   EYES: PERRL, No injection, discharge or scleral icterus. ENT: Moist mucous membranes. NECK: Normal ROM, NO LAD   CARDIOVASCULAR: Regular rate and rhythm. No murmurs or gallop. PULMONARY/CHEST: Airway patent. No retractions. Breath sounds clear with good air entry bilaterally.    ABDOMEN: Soft, Non-distended and non-tender, without guarding or rebound. SKIN: Acyanotic, warm, dry   MUSCULOSKELETAL: No swelling, tenderness or deformity   NEUROLOGICAL: Awake and oriented x 3. Pulses intact. Grossly nonfocal   Nursing note and vitals reviewed. NIH Stroke Scale     Time: 4:52 PM   Person Administering Scale: Sanjeeviimilton Mitchell MD     Level of consciousness: [0]   0 = Alert;   1 = Not alert;   2 = Not alert;   3 = Responds only with reflex motor or autonomic effects or totally unresponsive, flaccid, and flexic. LOC questions: [0]   0 = Answers both questions correctly. 1 = Answers one question correctly. 2 = Answers neither question correctly. LOC commands: [0]   0 = Performs both tasks correctly. 1 = Performs one task correctly. 2 = Performs neither task correctly. Best Gaze: [ 0 ]   0 = Normal   1 = Partial gaze palsy   2 = Forced deviation     Visual: [0]   0 = No visual loss   1 = Partial hemianopia   2 = Complete hemianopia   3 = Bilateral hemianopia     Facial Palsy: [0]   0 = Normal   1 = Minor paralysis   2 = Partial paralysis   3 = Complete paralysis     Motor left arm: [0]   0 = No drift;   1 = Drift   2 = Some effort against gravity;   3 = No effort against gravity;   4 = No movement. UN = Amputation     Motor right arm: [0]   0 = No drift   1 = Drift   2 = Some effort against gravity   3 = No effort against gravity   4 = No movement   UN = Amputation     Motor left leg: [0]   0 = No drift   1.= Drift   2 = Some effort against gravity   3 = No effort against gravity   4 = No movement. UN = Amputation     Motor right leg: [0]   0 = No drift   1 = Drift   2 = Some effort against gravity   3 = No effort against gravity   4 = No movement   UN = Amputation     Limb Ataxia: [0]   0 = Absent. 1 = Present in one limb.    2 = Present in two limbs   UN = Amputation     Sensory: [0]   0 = Absent   1.= Present in one limb   2 = Present in two limbs   UN = Amputation     Best Language: [0]   0 = No aphasia   1 = Mild-to-moderate aphasia   2 = Severe aphasia   3 = Mute, global aphasia     Dysarthria: [0]   1 = Mild-to-moderate dysarthria   2 = Severe dysarthria   UN = Intubated     Extinction and Inattention: [0]   0 = No abnormality.    1 = Visual, tactile, auditory, spatial, or personal inattention   2 = Profound ralph-inattention or extinction to more than one modality     TOTAL: Kwabena ]       ED Course & Medical Decision Making   Medications   perflutren lipid microspheres (DEFINITY) injection 1.65 mg (has no administration in time range)   sodium chloride flush 0.9 % injection 10 mL (10 mLs IntraVENous Given 2/24/22 1017)   sodium chloride flush 0.9 % injection 10 mL (has no administration in time range)   0.9 % sodium chloride infusion (has no administration in time range)   ondansetron (ZOFRAN-ODT) disintegrating tablet 4 mg (has no administration in time range)     Or   ondansetron (ZOFRAN) injection 4 mg (has no administration in time range)   enoxaparin (LOVENOX) injection 40 mg (40 mg SubCUTAneous Given 2/24/22 1017)   aspirin EC tablet 81 mg (81 mg Oral Given 2/24/22 1017)     Or   aspirin suppository 300 mg ( Rectal See Alternative 2/24/22 1017)   atorvastatin (LIPITOR) tablet 40 mg (40 mg Oral Given 2/23/22 2147)   melatonin tablet 3 mg (3 mg Oral Given 2/24/22 0011)   sertraline (ZOLOFT) tablet 25 mg (25 mg Oral Given 2/24/22 1017)   acetaminophen (TYLENOL) tablet 650 mg (650 mg Oral Given 2/24/22 1512)   iopamidol (ISOVUE-370) 76 % injection 75 mL (75 mLs IntraVENous Given 2/23/22 1611)      Labs Reviewed   COMPREHENSIVE METABOLIC PANEL W/ REFLEX TO MG FOR LOW K - Abnormal; Notable for the following components:       Result Value    Glucose 102 (*)     Total Bilirubin 1.8 (*)     All other components within normal limits    Narrative:     Performed at:  [facility]  [address] [facility] [place] [address]   Phone [phone]   URINALYSIS WITH REFLEX TO CULTURE - Abnormal; Notable for the following components:    Ketones, Urine TRACE (*)     All other components within normal limits    Narrative:     Performed at:  [facility]  [address] [facility] [place] [address]   Phone [phone]   LIPID PANEL - Abnormal; Notable for the following components:    Triglycerides 196 (*)     LDL Calculated 104 (*)     All other components within normal limits    Narrative:     Performed at:  [facility]  [address], [place] [address]   Phone [phone]   CBC WITH AUTO DIFFERENTIAL    Narrative:     Performed at:  [facility]  [address] [place] [address]   Phone [phone]   TROPONIN    Narrative:     Performed at:  [facility]  [address], [place] [address]   Phone [phone]   PROTIME-INR    Narrative:     Performed at:  [facility]  [address], [facility] [place] [address]   Phone [phone]   APTT    Narrative:     Performed at:  [facility]  [address], [facility] [place] [address]   Phone [phone]   BASIC METABOLIC PANEL W/ REFLEX TO MG FOR LOW K    Narrative:     Performed at:  [facility]  [address], [place] [address]   Phone [phone]   CBC    Narrative:     Performed at:  [facility]  [address], [place] [address]   Phone [phone]   PROTIME-INR    Narrative:     Performed at:  [facility]  [address], [place] [address]   Phone [phone]   HEMOGLOBIN A1C    Narrative:     Performed at:  [facility]  [address] [place] [address], [place] [address]   Phone [phone]   TSH WITH REFLEX TO FT4    Narrative:     Performed at:  [facility]  [address] [place] [address]   Phone [phone]   FACTOR 5 LEIDEN    Narrative:     Referred out by:  [facility]  [address] [place], [place] [place] [address]   Phone [phone]   ANTITHROMBIN PANEL   BETA-2 GLYCOPROTEIN ANTIBODIES   CARDIOLIPIN ANTIBODIES IGG & IGM   FACTOR 8 ACTIVITY   PROTHROMBIN GENE MUTATION   PROTEIN S ACTIVITY   PROTEIN C FUNCTIONAL   HOMOCYSTEINE   LUPUS ANTICOAGULANT   ELECTROPHORESIS PROTEIN, SERUM   POCT GLUCOSE   POCT GLUCOSE      MRI brain without contrast   Final Result   1. There appears to be a punctate subacute infarct within the left frontal   lobe as well as the right parietal lobe. 2. Otherwise, no acute intracranial abnormality. No evidence of an acute   infarct. 3. A few scattered foci of T2 FLAIR hyperintensity are seen within the   supratentorial white matter, which are nonspecific. Diagnostic considerations   include early chronic microvascular ischemic changes, sequelae of chronic   migraines, demyelinating lesions or perhaps vasculitis. CT Head WO Contrast   Final Result   1. No acute intracranial abnormality. 2. Unremarkable CTA of the head and neck. CTA HEAD NECK W CONTRAST   Final Result   1. No acute intracranial abnormality. 2. Unremarkable CTA of the head and neck. XR CHEST PORTABLE   Final Result   Stable cardiomegaly. No acute pulmonary findings. MRI CERVICAL SPINE WO CONTRAST    (Results Pending)      No results found. EKG INTERPRETATION:  EKG by my preliminary interpretation shows sinus rhythm with rate of 60, normal axis, normal intervals, with no ST changes indicative of ischemia at this time.     PROCEDURES:   Procedures    ASSESSMENT AND PLAN:  [id number], Sandra Jensen is a 49 y.o. female with history of high blood lipidemia, depression who presents complaining of left sided numbness. Patient stated that numbness started yesterday which is 24 hours ago. She is complaining of facial numbness as well as numbness of her left upper and lower extremity. On exam patient is nontoxic in no acute distress neurologically intact with NIH stroke scale of 0. The stroke protocol was not activated given the fact that this is 24 hours ago. Nonetheless a CT of the head and CTA of the head and neck were obtained with no abnormal findings. Nonetheless despite normal work-up I am recommending that patient be admitted for further evaluation and treatment given the fact that she is still complaining of left-sided numbness and tingling. Hospitalist has been consulted pending admission. ClINICAL IMPRESSION:  1. Cerebrovascular accident (CVA), unspecified mechanism ([address]        DISPOSITION    Admit  -Findings and recommendations explained to patient. She expressed understanding and agreed with the plan. CRITICAL CARE NOTE:  There was a high probability of clinically significant life-threatening deterioration of the patient's condition requiring my urgent intervention. This includes multiple reevaluations, vital sign monitoring, pulse oximetry monitoring, telemetry monitoring, clinical response to the IV medications, reviewing the nursing notes, consultation time, dictation/documentation time, and interpretation of the labwork. (This time excludes time spent performing procedures). I personally saw the patient and independently provided 35 minutes of non-concurrent critical care out of the total shared critical care time provided. _________________________________________________________________________________________  This record is transcribed utilizing voice recognition technology. There are inherent limitations in this technology. In addition, there may be limitations in editing of this report.  If there are any discrepancies, please contact me directly.          Greta Berrios MD  02/24/22 4281

## 2022-02-23 NOTE — ED TRIAGE NOTES
Patient with a headache all day yesterday and last night started with numbness and tingling on the last side. Patient with no facial droop, ambulated to triage without difficulty. Answering all questions.   MD into see patient

## 2022-02-23 NOTE — ED TRIAGE NOTES
Pt arrived to dept via private vehicle. Pt c/o headache and left arm numbness that goes down in to her left leg also. Symptoms started last night per pt report. Denies any pain at this time but reports \"discomfort\" in her head and chest.  Pt awake, alert and oriented x 3. Skin warm and dry/normal color for ethnicity. Resp easy and unlabored. Pt placed in gown and on cardiac monitor. Call light in reach. Will continue to monitor.

## 2022-02-23 NOTE — ED NOTES
Pt remains alert and oriented with no acute distress noted. Report given to [facility]. Pain score and pt condition reviewed.      Deedee Sanderson RN  02/23/22 0606

## 2022-02-23 NOTE — TELEPHONE ENCOUNTER
Has had headache x 2 days. Started yesterday with left sided numbness and tingling. Wanting to know if should be seen, or what she should do. Please advise.  Please call Yarelis Schaefer back at [phone]

## 2022-02-23 NOTE — ED NOTES
Pharmacy Medication Reconciliation Note     List of medications patient is currently taking is complete. Source of information:   1. EMR  2. patient    Notes regarding home medications:   1. Reports she is awaiting approval of Trulicity - her doctor prescribed it for weight loss  2.  Did  not take her other medications today     Denies taking any other OTC or herbal medications    Yesenia Castaneda PharmD, BCPS  2/23/2022  6:35 PM

## 2022-02-23 NOTE — TELEPHONE ENCOUNTER
Patient calls complaining of Headache  Onset: 2 days ago  Timing: yesterday intermittent, today constant  Severity: Mild  Progression:  Symptoms: Headache, numbness and tingling on L side of face that started last night. Has had these sxs before and testing was done. Double vision. No speech concerns or confusion. Other Factors: Hx of TIA  Relieved by: Took advil yesterday. Spoke with Dr Zachary Rodriguez and was told to send patient to ED. Patient notified and will go to [facility]. [facility] for you.

## 2022-02-24 ENCOUNTER — APPOINTMENT (OUTPATIENT)
Dept: MRI IMAGING | Age: 52
DRG: 065 | End: 2022-02-24
Payer: COMMERCIAL

## 2022-02-24 LAB
ANION GAP SERPL CALCULATED.3IONS-SCNC: 13 MMOL/L (ref 3–16)
BUN BLDV-MCNC: 13 MG/DL (ref 7–20)
CALCIUM SERPL-MCNC: 9.8 MG/DL (ref 8.3–10.6)
CHLORIDE BLD-SCNC: 100 MMOL/L (ref 99–110)
CHOLESTEROL, TOTAL: 193 MG/DL (ref 0–199)
CO2: 26 MMOL/L (ref 21–32)
CREAT SERPL-MCNC: 0.7 MG/DL (ref 0.6–1.1)
EKG ATRIAL RATE: 60 BPM
EKG ATRIAL RATE: 63 BPM
EKG DIAGNOSIS: NORMAL
EKG DIAGNOSIS: NORMAL
EKG P AXIS: 21 DEGREES
EKG P AXIS: 30 DEGREES
EKG P-R INTERVAL: 162 MS
EKG P-R INTERVAL: 180 MS
EKG Q-T INTERVAL: 388 MS
EKG Q-T INTERVAL: 418 MS
EKG QRS DURATION: 62 MS
EKG QRS DURATION: 88 MS
EKG QTC CALCULATION (BAZETT): 388 MS
EKG QTC CALCULATION (BAZETT): 427 MS
EKG R AXIS: -22 DEGREES
EKG R AXIS: 14 DEGREES
EKG T AXIS: 103 DEGREES
EKG T AXIS: 7 DEGREES
EKG VENTRICULAR RATE: 60 BPM
EKG VENTRICULAR RATE: 63 BPM
ESTIMATED AVERAGE GLUCOSE: 114 MG/DL
GFR AFRICAN AMERICAN: >60
GFR NON-AFRICAN AMERICAN: >60
GLUCOSE BLD-MCNC: 99 MG/DL (ref 70–99)
HBA1C MFR BLD: 5.6 %
HCT VFR BLD CALC: 40.7 % (ref 36–48)
HDLC SERPL-MCNC: 50 MG/DL (ref 40–60)
HEMOGLOBIN: 13.7 G/DL (ref 12–16)
HOMOCYSTEINE: 14 UMOL/L (ref 0–10)
INR BLD: 0.94 (ref 0.88–1.12)
LDL CHOLESTEROL CALCULATED: 104 MG/DL
LV EF: 58 %
LVEF MODALITY: NORMAL
MCH RBC QN AUTO: 29.3 PG (ref 26–34)
MCHC RBC AUTO-ENTMCNC: 33.7 G/DL (ref 31–36)
MCV RBC AUTO: 86.9 FL (ref 80–100)
PDW BLD-RTO: 14.1 % (ref 12.4–15.4)
PLATELET # BLD: 307 K/UL (ref 135–450)
PMV BLD AUTO: 8 FL (ref 5–10.5)
POTASSIUM REFLEX MAGNESIUM: 4.1 MMOL/L (ref 3.5–5.1)
PROTHROMBIN TIME: 10.6 SEC (ref 9.9–12.7)
RBC # BLD: 4.68 M/UL (ref 4–5.2)
SODIUM BLD-SCNC: 139 MMOL/L (ref 136–145)
TRIGL SERPL-MCNC: 196 MG/DL (ref 0–150)
TSH REFLEX FT4: 3.2 UIU/ML (ref 0.27–4.2)
VLDLC SERPL CALC-MCNC: 39 MG/DL
WBC # BLD: 7.3 K/UL (ref 4–11)

## 2022-02-24 PROCEDURE — 85670 THROMBIN TIME PLASMA: CPT

## 2022-02-24 PROCEDURE — 80048 BASIC METABOLIC PNL TOTAL CA: CPT

## 2022-02-24 PROCEDURE — 85610 PROTHROMBIN TIME: CPT

## 2022-02-24 PROCEDURE — 36415 COLL VENOUS BLD VENIPUNCTURE: CPT

## 2022-02-24 PROCEDURE — 94760 N-INVAS EAR/PLS OXIMETRY 1: CPT

## 2022-02-24 PROCEDURE — 97165 OT EVAL LOW COMPLEX 30 MIN: CPT

## 2022-02-24 PROCEDURE — 85301 ANTITHROMBIN III ANTIGEN: CPT

## 2022-02-24 PROCEDURE — 85303 CLOT INHIBIT PROT C ACTIVITY: CPT

## 2022-02-24 PROCEDURE — 2060000000 HC ICU INTERMEDIATE R&B

## 2022-02-24 PROCEDURE — 85027 COMPLETE CBC AUTOMATED: CPT

## 2022-02-24 PROCEDURE — 86147 CARDIOLIPIN ANTIBODY EA IG: CPT

## 2022-02-24 PROCEDURE — 2580000003 HC RX 258: Performed by: INTERNAL MEDICINE

## 2022-02-24 PROCEDURE — 85300 ANTITHROMBIN III ACTIVITY: CPT

## 2022-02-24 PROCEDURE — 83036 HEMOGLOBIN GLYCOSYLATED A1C: CPT

## 2022-02-24 PROCEDURE — 85306 CLOT INHIBIT PROT S FREE: CPT

## 2022-02-24 PROCEDURE — 85240 CLOT FACTOR VIII AHG 1 STAGE: CPT

## 2022-02-24 PROCEDURE — 97530 THERAPEUTIC ACTIVITIES: CPT

## 2022-02-24 PROCEDURE — 93010 ELECTROCARDIOGRAM REPORT: CPT | Performed by: INTERNAL MEDICINE

## 2022-02-24 PROCEDURE — 83090 ASSAY OF HOMOCYSTEINE: CPT

## 2022-02-24 PROCEDURE — 84443 ASSAY THYROID STIM HORMONE: CPT

## 2022-02-24 PROCEDURE — 84155 ASSAY OF PROTEIN SERUM: CPT

## 2022-02-24 PROCEDURE — 85730 THROMBOPLASTIN TIME PARTIAL: CPT

## 2022-02-24 PROCEDURE — 9990000010 HC NO CHARGE VISIT: Performed by: PHYSICAL THERAPIST

## 2022-02-24 PROCEDURE — 85613 RUSSELL VIPER VENOM DILUTED: CPT

## 2022-02-24 PROCEDURE — 72141 MRI NECK SPINE W/O DYE: CPT

## 2022-02-24 PROCEDURE — 6370000000 HC RX 637 (ALT 250 FOR IP): Performed by: INTERNAL MEDICINE

## 2022-02-24 PROCEDURE — 92610 EVALUATE SWALLOWING FUNCTION: CPT

## 2022-02-24 PROCEDURE — 6360000002 HC RX W HCPCS: Performed by: INTERNAL MEDICINE

## 2022-02-24 PROCEDURE — 70551 MRI BRAIN STEM W/O DYE: CPT

## 2022-02-24 PROCEDURE — 81240 F2 GENE: CPT

## 2022-02-24 PROCEDURE — 86146 BETA-2 GLYCOPROTEIN ANTIBODY: CPT

## 2022-02-24 PROCEDURE — 80061 LIPID PANEL: CPT

## 2022-02-24 PROCEDURE — 93306 TTE W/DOPPLER COMPLETE: CPT

## 2022-02-24 PROCEDURE — 84165 PROTEIN E-PHORESIS SERUM: CPT

## 2022-02-24 PROCEDURE — 85732 THROMBOPLASTIN TIME PARTIAL: CPT

## 2022-02-24 PROCEDURE — 81241 F5 GENE: CPT

## 2022-02-24 RX ADMIN — ENOXAPARIN SODIUM 40 MG: 100 INJECTION SUBCUTANEOUS at 10:17

## 2022-02-24 RX ADMIN — Medication 3 MG: at 00:11

## 2022-02-24 RX ADMIN — ACETAMINOPHEN 650 MG: 325 TABLET ORAL at 00:11

## 2022-02-24 RX ADMIN — ASPIRIN 81 MG: 81 TABLET, COATED ORAL at 10:17

## 2022-02-24 RX ADMIN — ACETAMINOPHEN 650 MG: 325 TABLET ORAL at 21:28

## 2022-02-24 RX ADMIN — Medication 3 MG: at 21:27

## 2022-02-24 RX ADMIN — Medication 10 ML: at 10:17

## 2022-02-24 RX ADMIN — SERTRALINE 25 MG: 25 TABLET, FILM COATED ORAL at 10:17

## 2022-02-24 RX ADMIN — Medication 10 ML: at 21:28

## 2022-02-24 RX ADMIN — ACETAMINOPHEN 650 MG: 325 TABLET ORAL at 15:12

## 2022-02-24 RX ADMIN — ATORVASTATIN CALCIUM 40 MG: 40 TABLET, FILM COATED ORAL at 21:28

## 2022-02-24 ASSESSMENT — PAIN DESCRIPTION - PROGRESSION
CLINICAL_PROGRESSION: NOT CHANGED
CLINICAL_PROGRESSION: RESOLVED

## 2022-02-24 ASSESSMENT — PAIN DESCRIPTION - LOCATION
LOCATION: HEAD
LOCATION: HEAD

## 2022-02-24 ASSESSMENT — PAIN SCALES - WONG BAKER
WONGBAKER_NUMERICALRESPONSE: 0

## 2022-02-24 ASSESSMENT — PAIN DESCRIPTION - FREQUENCY
FREQUENCY: INTERMITTENT
FREQUENCY: CONTINUOUS

## 2022-02-24 ASSESSMENT — PAIN SCALES - GENERAL
PAINLEVEL_OUTOF10: 2
PAINLEVEL_OUTOF10: 4
PAINLEVEL_OUTOF10: 0
PAINLEVEL_OUTOF10: 2
PAINLEVEL_OUTOF10: 0
PAINLEVEL_OUTOF10: 4

## 2022-02-24 ASSESSMENT — PAIN DESCRIPTION - ONSET
ONSET: SUDDEN
ONSET: SUDDEN

## 2022-02-24 ASSESSMENT — PAIN DESCRIPTION - PAIN TYPE
TYPE: ACUTE PAIN
TYPE: ACUTE PAIN

## 2022-02-24 ASSESSMENT — PAIN - FUNCTIONAL ASSESSMENT
PAIN_FUNCTIONAL_ASSESSMENT: ACTIVITIES ARE NOT PREVENTED
PAIN_FUNCTIONAL_ASSESSMENT: ACTIVITIES ARE NOT PREVENTED

## 2022-02-24 ASSESSMENT — PAIN DESCRIPTION - ORIENTATION: ORIENTATION: ANTERIOR

## 2022-02-24 ASSESSMENT — PAIN DESCRIPTION - DESCRIPTORS
DESCRIPTORS: HEADACHE
DESCRIPTORS: HEADACHE

## 2022-02-24 NOTE — PROGRESS NOTES
Physical Therapy  [facility]  [id number]  [id number]    Per OT, pt is up independently in room and not having any deficits with mobility tasks; will sign off from therapy at this time  Electronically signed by MANUEL MARTIN PT on 2/24/2022 at 10:41 AM

## 2022-02-24 NOTE — PLAN OF CARE
Problem: HEMODYNAMIC STATUS  Goal: Patient has stable vital signs and fluid balance  Outcome: Ongoing     Problem: ACTIVITY INTOLERANCE/IMPAIRED MOBILITY  Goal: Mobility/activity is maintained at optimum level for patient  Outcome: Ongoing     Problem: COMMUNICATION IMPAIRMENT  Goal: Ability to express needs and understand communication  Outcome: Ongoing     Problem: Pain:  Description: Pain management should include both nonpharmacologic and pharmacologic interventions.   Goal: Pain level will decrease  Description: Pain level will decrease  Outcome: Ongoing  Goal: Control of acute pain  Description: Control of acute pain  Outcome: Ongoing  Goal: Control of chronic pain  Description: Control of chronic pain  Outcome: Ongoing

## 2022-02-24 NOTE — PROGRESS NOTES
NAME:  Zo Jain  YOB: 1970  MEDICAL RECORD NUMBER:  [id number]  TODAYS DATE:  2/24/2022    Discussed personal risk factors for Stroke /TIA with patient/family, and ways to reduce the risk for a recurrent stroke. Patient's personal risk factors which were identified are:     [] Alcohol Abuse: check with your physician before any alcohol consumption. [] Atrial fibrillation: may cause blood clots. [] Drug Abuse: Seek help, talk with your doctor  [] Clotting Disorder  [] Diabetes  [x] Family history of stroke or heart disease  [] High Blood Pressure/Hypertension: work with your physician. [x] High cholesterol: monitor cholesterol levels with your physician. [x] Overweight/Obesity: work with your physician for your ideal body weight.  [] Physical Inactivity: get regular exercise as directed by your physician. [] Personal history of previous TIA or stroke  [] Poor Diet; decrease salt (sodium) in your diet, follow diet directed by physician. [] Smoking: Cigarette/Cigar: stop smoking. Advised pt. that you can reduce your risk for stroke/TIA by modifying/controlling the risk factors that you have. Pt.advised to take the medications as prescribed, which will be detailed in the discharge instructions, and to not stop taking them without consulting their physician. In addition, pt. advised to maintain a healthy diet, exercise regularly and to not smoke. [facility]'s Stroke treatment and prevention, Managing your recovery  notebook  provided and/or reviewed  with patient/family. The notebook includes, but not limited to, sections addressing warning signs & symptoms of a stroke, which are: sudden numbness or weakness especially on one side of the body, sudden confusion, difficulty speaking or understanding, sudden changes in vision, sudden dizziness or loss of balance/ coordination, sudden severe headache, syncope and seizure.   The need to call EMS ([phone]) immediately if signs & symptoms occur is emphasized . The notebook also provides education on Stroke community resources and stroke advocacy. The need for follow-up after discharge was highlighted with patient/family with them being able to repeat understanding of the importance of this.       Electronically signed by Danielle Graves on 2/24/2022 at 3:07 PM

## 2022-02-24 NOTE — PROGRESS NOTES
Speech Language Pathology  Facility/Department: [facility] PROGRESSIVE CARE   CLINICAL BEDSIDE SWALLOW EVALUATION    NAME: Brenda Marrero  : 1970  MRN: [id number]    ADMISSION DATE: 2022  ADMITTING DIAGNOSIS: Numbness [R210]    49year old has Hyperlipidemia; Depression; Gilbert's disease; Gastroesophageal reflux disease with esophagitis; History of TIA (transient ischemic attack); Morbid obesity with BMI of 40.0-44.9, adult ([facility] [address] Metabolic syndrome; and Numbness on their problem list.    ONSET DATE: 2022    CHART REVIEW: Patient admitted with C/O left sided numbness  H&P Patient is a 48 YO White F with PMH of HLD, Borderline DM, TIA on ASA, depression who presents to hospital for left sided numbness. fpr 1 day. Started while sitting, patient is complaining of facial numbness as well as numbness of her left upper and lower extremity. Her symptoms have persisted forcing her to seek care. Apart from the numbness she denies any fevers chills nausea vomiting chest pain or shortness of breath.  She does not have any focal deficits. Denied fever, chills, nausea, vomiting, diarrhea. CHEST X-RAY 22  Impression   Stable cardiomegaly.  No acute pulmonary findings. CT HEAD 22  Impression   1. No acute intracranial abnormality. 2. Unremarkable CTA of the head and neck. MRI BRAIN 22  Impression   1. There appears to be a punctate subacute infarct within the left frontal   lobe as well as the right parietal lobe. 2. Otherwise, no acute intracranial abnormality.  No evidence of an acute   infarct. 3. A few scattered foci of T2 FLAIR hyperintensity are seen within the   supratentorial white matter, which are nonspecific.  Diagnostic considerations   include early chronic microvascular ischemic changes, sequelae of chronic   migraines, demyelinating lesions or perhaps vasculitis.         Date of Eval: 2022  Evaluating Therapist: Gillermo Dakins, SLP    Current Diet level:  Current Diet Ksenia Watson Regular  Current Liquid Diet : Thin      Primary Complaint  Patient Complaint: Medical team referral for CVA workup    Pain:  Pain Assessment  Pain Assessment: 0-10  Pain Level: 0  Patient's Stated Pain Goal: No pain  Pain Type: Acute pain  Pain Location: Head  Pain Orientation: Anterior  Pain Descriptors: Headache  Pain Frequency: Intermittent  Pain Onset: Sudden  Clinical Progression: Not changed  Functional Pain Assessment: Activities are not prevented  Non-Pharmaceutical Pain Intervention(s): Rest  Response to Pain Intervention: Asleep with RR greater than 10    Reason for Referral  Ta Jesus was referred for a bedside swallow evaluation to assess the efficiency of her swallow function, identify signs and symptoms of aspiration and make recommendations regarding safe dietary consistencies, effective compensatory strategies, and safe eating environment. Impression  Dysphagia Diagnosis: Swallow function appears grossly intact  Dysphagia Impression :   · Accepted and tolerated evaluation at bedside. · Pt oriented x4, able to follow complex dx and verbally responsive. · Oral phase appears WNL. Patient demonstrated timely bolus formation, adequate mastication and clearance of bolus. · Pharyngeal phase appears [place] [place] with adequate timely swallow initiation and adequate laryngeal elevation. No overt s/s of aspiration or penetration. Swallow function appears [facility] [place] for all trials presented. · ST to follow up 1x to ensure diet tolerance. Dysphagia Outcome Severity Scale: Level 6: Within functional limits/Modified independence     Treatment Plan  Requires SLP Intervention: Yes  Duration/Frequency of Treatment: 1x  D/C Recommendations: To be determined       Recommended Diet and Intervention  Diet Solids Recommendation: Regular  Liquid Consistency Recommendation:  Thin     Recommendations: Dysphagia treatment  Therapeutic Interventions: Diet tolerance monitoring;Patient/Family education    Compensatory Swallowing Strategies  Compensatory Swallowing Strategies: Remain upright for 30-45 minutes after meals;Upright as possible for all oral intake (Reflux precautions)    Treatment/Goals  Dysphagia Goals: The patient will tolerate recommended diet without observed clinical signs of aspiration; The patient/caregiver will demonstrate understanding of compensatory strategies for improved swallowing safety. General  Chart Reviewed: Yes  Subjective  Subjective: Patient accepted and tolerated evaluation at bedside  Behavior/Cognition: Alert; Cooperative;Pleasant mood  Communication Observation: Functional  Follows Directions: Complex  Dentition: Adequate  Patient Positioning: Upright in bed  Prior Dysphagia History: None documented, pt denies hx, does endorse GERD  Consistencies Administered: Reg solid; Dysphagia Soft and Bite-Sized (Dysphagia III); Thin - cup; Thin - straw    Vision/Hearing  Vision: Impaired  Vision Exceptions: Wears glasses for reading  Hearing: Within functional limits    Oral Motor Deficits  Oral/Motor  Oral Motor: Within functional limits    Oral Phase Dysfunction  Oral Phase  Oral Phase: WNL  Oral Phase  Oral Phase - Comment: Oral phase appears WNL. Patient demonstrated timely bolus formation, adequate mastication and clearance of bolus     Indicators of Pharyngeal Phase Dysfunction   Pharyngeal Phase  Pharyngeal Phase: WFL  Pharyngeal Phase   Pharyngeal: Pharyngeal phase appears WFL with adequate timely swallow initiation and adequate laryngeal elevation. No overt s/s of aspiration or penetration.     Prognosis  Prognosis  Prognosis for safe diet advancement: good  Individuals consulted  Consulted and agree with results and recommendations: Patient;RN    Education  Patient Education: Patient educated on purpose of ST services/goals/recommendations  Patient Education Response: Verbalizes understanding;Demonstrated understanding             Therapy Time  SLP Individual Minutes  Time In: 9800  Time Out: 10 [facility]: [address] [place] [address], [address] [place] [address]  Speech-Language Pathologist  On 02/24/22 at 11:35 AM

## 2022-02-24 NOTE — PROGRESS NOTES
4 Eyes Skin Assessment     NAME:  Sandra Jensen  YOB: 1970  MEDICAL RECORD NUMBER:  [id number]    The patient is being assess for  Admission    I agree that 2 RN's have performed a thorough Head to Toe Skin Assessment on the patient. ALL assessment sites listed below have been assessed. Areas assessed by both nurses:    Head, Face, Ears, Shoulders, Back, Chest, Arms, Elbows, Hands, Sacrum. Buttock, Coccyx, Ischium and Legs. Feet and Heels        Does the Patient have a Wound?  No noted wound(s)       Chandana Prevention initiated:  No   Wound Care Orders initiated:  No    Pressure Injury (Stage 3,4, Unstageable, DTI, NWPT, and Complex wounds) if present place consult order under [de-identified] No    New and Established Ostomies if present place consult order under : No      Nurse 1 eSignature: Electronically signed by Wilhemena Crigler, RN on 2/23/22 at 9:49 PM EST    **SHARE this note so that the co-signing nurse is able to place an eSignature**    Nurse 2 eSignature: Electronically signed by Elliot Koehler RN on 2/24/22 at 12:59 AM EST

## 2022-02-24 NOTE — H&P
Hospital Medicine History & Physical      PCP: Ita Cheng MD    Date of Admission: 2022    Chief Complaint:  Left sided numbness    History Of Present Illness:      Patient is a 50 YO White F with PMH of HLD, Borderline DM, TIA on ASA, depression who presents to hospital for left sided numbness. fpr 1 day. Started while sitting, patient is complaining of facial numbness as well as numbness of her left upper and lower extremity. Her symptoms have persisted forcing her to seek care. Apart from the numbness she denies any fevers chills nausea vomiting chest pain or shortness of breath. She does not have any focal deficits. Denied fever, chills, nausea, vomiting, diarrhea. Past Medical History:          Diagnosis Date    Arthritis     Cancer ([facility] [address]     basal-skin    Depression     Hyperlipidemia        Past Surgical History:          Procedure Laterality Date     SECTION      x1    CHOLECYSTECTOMY      COLONOSCOPY  2020    Dr. Oneal Rider N/A 2020    COLONOSCOPY performed by Shira Lopez MD at [facility]      bilateral       Medications Prior to Admission:      Prior to Admission medications    Medication Sig Start Date End Date Taking?  Authorizing Provider   melatonin 3 MG TABS tablet Take 3 mg by mouth nightly as needed   Yes Historical Provider, MD   sertraline (ZOLOFT) 50 MG tablet TAKE 1 TABLET BY MOUTH IN  THE EVENING 22  Yes Gabriele Chang MD   simvastatin (ZOCOR) 20 MG tablet TAKE 1 TABLET BY MOUTH AT  NIGHT 22  Yes Gabriele Chang MD   Cyanocobalamin (B-12 PO) Take 1,000 Units by mouth daily    Yes Historical Provider, MD   MAGNESIUM PO Take 400 mg by mouth daily    Yes Historical Provider, MD   Ascorbic Acid (VITAMIN C PO) Take 500 mg by mouth daily    Yes Historical Provider, MD   VITAMIN D PO Take 1,000 Units by mouth daily    Yes Historical Provider, MD   aspirin 81 MG chewable tablet Take 81 mg by mouth daily   Yes Historical Provider, MD   Dulaglutide 0.75 MG/0.5ML SOPN Inject 0.75 mg into the skin once a week 2/10/22   NOLBERTO Carmen CNP       Allergies:  Patient has no known allergies. Social History:      TOBACCO:   reports that she has never smoked. She has never used smokeless tobacco.  ETOH:   reports previous alcohol use. Family History:       Reviewed in detail and non contributory          Problem Relation Age of Onset    Breast Cancer Mother 76       REVIEW OF SYSTEMS:   Pertinent positives as noted in the HPI. All other systems reviewed and negative. PHYSICAL EXAM PERFORMED:    BP (!) 132/56   Pulse 66   Temp 98.3 °F (36.8 °C)   Resp 14   Ht 5' 10\" (1.778 m)   Wt 298 lb 15.1 oz (135.6 kg)   SpO2 98%   BMI 42.89 kg/m²     General appearance:  No apparent distress, cooperative. HEENT:  Normal cephalic, atraumatic without obvious deformity. Conjunctivae/corneas clear. Neck: Supple, with full range of motion. No cervical lymphadenopathy  Respiratory:  Normal respiratory effort. Clear to auscultation, bilaterally without Rales/Wheezes/Rhonchi. Cardiovascular:  Regular rate and rhythm with normal S1/S2 without murmurs, rubs or gallops. Abdomen: Soft, non-tender, non-distended, normal bowel sounds. Musculoskeletal:  No edema noted bilaterally. No tenderness on palpation   Skin: no rash visible  Neurologic:  Decreased sensation on left side, otherwise Neurologically intact without any focal motor deficits.   Psychiatric:  Alert and oriented, normal mood  Peripheral Pulses: +2 palpable, equal bilaterally       Labs:     Recent Labs     02/23/22  1523   WBC 7.4   HGB 13.8   HCT 40.6        Recent Labs     02/23/22  1523      K 4.0      CO2 23   BUN 10   CREATININE 0.6   CALCIUM 9.3     Recent Labs     02/23/22  1523   AST 20   ALT 16   BILITOT 1.8*   ALKPHOS 69     Recent Labs     02/23/22  1522   INR 0.98     Recent Labs 02/23/22  1523   TROPONINI <0.01       Urinalysis:      Lab Results   Component Value Date    NITRU Negative 02/23/2022    BLOODU Negative 02/23/2022    SPECGRAV >1.030 02/23/2022    GLUCOSEU Negative 02/23/2022       Radiology:       CT Head WO Contrast   Final Result   1. No acute intracranial abnormality. 2. Unremarkable CTA of the head and neck. CTA HEAD NECK W CONTRAST   Final Result   1. No acute intracranial abnormality. 2. Unremarkable CTA of the head and neck. XR CHEST PORTABLE   Final Result   Stable cardiomegaly. No acute pulmonary findings. Active Hospital Problems    Diagnosis Date Noted    Numbness [R20.0] 02/23/2022         Patient is a 50 YO White F with PMH of HLD, Borderline DM, TIA on ASA, depression who presents to hospital for left sided numbness. fpr 1 day. Started while sitting, patient is complaining of facial numbness as well as numbness of her left upper and lower extremity. Her symptoms have persisted forcing her to seek care. Apart from the numbness she denies any fevers chills nausea vomiting chest pain or shortness of breath. She does not have any focal deficits. Denied fever, chills, nausea, vomiting, diarrhea. Left sided Numbness  History of TIA on ASA  Borderline DM  HLD    Plan  Will order MRI brain, Neurology consult  ASA, statin  POC glucose checks  Echo with Bubble study  DVT PPx - lovenox  Diet: No diet orders on file  Code Status: No Order    PT/OT Eval Status: ordered    Dispo - pending clinical improvement       Asaf Minaya MD    The note was completed using EMR and Dragon dictation system. Every effort was made to ensure accuracy; however, inadvertent computerized transcription errors may be present. Thank you Maddie Dunn MD for the opportunity to be involved in this patient's care. If you have any questions or concerns please feel free to contact me at [phone].     Asaf Minaya MD

## 2022-02-24 NOTE — CARE COORDINATION
INITIAL CASE MANAGEMENT ASSESSMENT    Met with patient to assess possible discharge needs. Explained Case Management role/services. Living Situation: Lives with spouse in a one story house with 2 LEROY. ADLs: Independent     DME: N/A    PT/OT Recs: No recommendations, scored 24/24 on AM-PAC ADL. No further needs for PT/OT. Active Services: N/A     Transportation: Active . Family will transport at discharge. Medications: [facility] [place]    PCP: Michael Bryant MD      HD/PD: N/A    PLAN/COMMENTS: CM met with the patient to discuss discharge planning. No needs identified at this time. Discharge plan is to return home with family. Provided contact information for patient or family to call with any questions. Will follow and assist as needed.     Leodan MICHEL RN  Case Management  [id number]    Electronically signed by Leodan Sauceda RN on 2/24/2022 at 3:43 PM

## 2022-02-24 NOTE — PROGRESS NOTES
MRI questionnaire completed and in patient's chart.  Electronically signed by Phani Bethea RN on 2/23/2022 at 10:01 PM

## 2022-02-24 NOTE — PROGRESS NOTES
98.5 °F (36.9 °C)   Resp 18   Ht 5' 10\" (1.778 m)   Wt (!) 300 lb 11.3 oz (136.4 kg)   SpO2 99%   BMI 43.15 kg/m²     General appearance: No apparent distress, appears stated age and cooperative. HEENT: Pupils equal, round, and reactive to light. Conjunctivae/corneas clear. Neck: Supple, with full range of motion. No jugular venous distention. Trachea midline. Respiratory:  Normal respiratory effort. Clear to auscultation, bilaterally without Rales/Wheezes/Rhonchi. Cardiovascular: Regular rate and rhythm with normal S1/S2 without murmurs, rubs or gallops. Abdomen: Soft, non-tender, non-distended with normal bowel sounds. Musculoskeletal: No clubbing, cyanosis or edema bilaterally. Full range of motion without deformity. Skin: Skin color, texture, turgor normal.  No rashes or lesions. Neurologic:  Neurovascularly intact without any focal sensory/motor deficits. Cranial nerves: II-XII intact, grossly non-focal.  Psychiatric: Alert and oriented, thought content appropriate, normal insight  Capillary Refill: Brisk,< 3 seconds   Peripheral Pulses: +2 palpable, equal bilaterally       Labs:   Recent Labs     02/23/22  1523 02/24/22  0440   WBC 7.4 7.3   HGB 13.8 13.7   HCT 40.6 40.7    307     Recent Labs     02/23/22  1523 02/24/22  0440    139   K 4.0 4.1    100   CO2 23 26   BUN 10 13   CREATININE 0.6 0.7   CALCIUM 9.3 9.8     Recent Labs     02/23/22  1523   AST 20   ALT 16   BILITOT 1.8*   ALKPHOS 69     Recent Labs     02/23/22  1522 02/24/22  0440   INR 0.98 0.94     Recent Labs     02/23/22  1523   TROPONINI <0.01       Urinalysis:      Lab Results   Component Value Date    NITRU Negative 02/23/2022    BLOODU Negative 02/23/2022    SPECGRAV >1.030 02/23/2022    GLUCOSEU Negative 02/23/2022       Radiology:  MRI brain without contrast   Final Result   1. There appears to be a punctate subacute infarct within the left frontal   lobe as well as the right parietal lobe.    2. Otherwise, no acute intracranial abnormality. No evidence of an acute   infarct. 3. A few scattered foci of T2 FLAIR hyperintensity are seen within the   supratentorial white matter, which are nonspecific. Diagnostic considerations   include early chronic microvascular ischemic changes, sequelae of chronic   migraines, demyelinating lesions or perhaps vasculitis. CT Head WO Contrast   Final Result   1. No acute intracranial abnormality. 2. Unremarkable CTA of the head and neck. CTA HEAD NECK W CONTRAST   Final Result   1. No acute intracranial abnormality. 2. Unremarkable CTA of the head and neck. XR CHEST PORTABLE   Final Result   Stable cardiomegaly. No acute pulmonary findings. Assessment/Plan:    Active Hospital Problems    Diagnosis Date Noted    Numbness [R20.0] 02/23/2022         DVT Prophylaxis: Lovenox  Diet: ADULT DIET; Regular; 4 carb choices (60 gm/meal)  Code Status: Full Code    PT/OT Eval Status: Complete    Dispo -pending neurology recommendations.     Monty Ruff MD    This chart was likely completed using voice recognition technology and may contain unintended grammatical , phraseology,and/or punctuation errors

## 2022-02-24 NOTE — PLAN OF CARE
Problem: HEMODYNAMIC STATUS  Goal: Patient has stable vital signs and fluid balance  Outcome: Ongoing     Problem: ACTIVITY INTOLERANCE/IMPAIRED MOBILITY  Goal: Mobility/activity is maintained at optimum level for patient  Outcome: Ongoing     Problem: COMMUNICATION IMPAIRMENT  Goal: Ability to express needs and understand communication  Outcome: Ongoing     Problem: Pain:  Goal: Pain level will decrease  Description: Pain level will decrease  Outcome: Ongoing

## 2022-02-24 NOTE — PROGRESS NOTES
Physical Therapy  Robin Awad  [id number]  [id number]    Attempted to see for PT session however pt out of room for MRI; will attempt later as schedule permits  Electronically signed by MANUEL MARTIN PT on 2/24/2022 at 8:20 AM

## 2022-02-24 NOTE — CONSULTS
Neurology Consult Note  Reason for Consult: CVA    Chief complaint: \"numbness\"    Dr Luis Carlos Vogel MD asked me to see Kevin Fierro in consultation for evaluation of CVA    History of Present Illness:  I obtained my information via the patient, supplemented with chart review. Kevin Fierro is a 49 y.o. female depression, HLD who presented to the ED on 2/23/22 for evaluation of numbness. Per my encounter the patient tells me the night prior to admission she was laying in bed, restless-up and down, tried sleeping in different areas of the house. She is generally has a difficult time sleeping. During the night she ha a rather sudden onset of numbness and tingling primarily in her left arm, leg and throughout. When asked she does report some mild left lower facial numbness much less pronounced. She felt like the back of her neck was aching, denies any stiffness. She had a frontal dull headache which is now improved; she does get headache rarely for which this is similar to her previous experiences. She denies any weakness, coordination difficulty, she was able to walk and do laundry. No speech changes. She did have some blurry vision. No nausea, vomiting, light sensitivity. Her symptoms persisted for which she was concerned and decided to seek ED evaluation. On arrival to the ED her BP was 140/91. CT head was without acute findings. CTA head & neck was unremarkable. MRI Brain w/o was completed prior to encounter and revealing for left punctate subacute infarct of the left frontal lobe as well as right parietal lobe. Scattered T2 FLAIR hyperattenuations which is non specific. No acute stroke to explain her symptoms. Her left numbness is mildly improved, still persist.     She denies any hx of stroke/TIA, MS. She has family hx of young stroke, her dad had his first stroke in his 42's.      She has had similar numbness in the past, though less severe in intensity and duration, reports a negative MRI, she does not think she saw neurology. Her symptoms had resolved. Home medications include simvastatin 20mg. She intermittently takes 81mg ASA that she frequently misses doses but consistently has been taking the last week. She is tearful during encounter after primary team told her of her MRI revealing evidence of stroke and FLAIR lesions. She states she has anxiety and depression her mother, father and son all  within the last 6 years. No thoughts of self harm.          Medical History:  Past Medical History:   Diagnosis Date    Arthritis     Cancer ([address]     basal-skin    Depression     Hyperlipidemia      Past Surgical History:   Procedure Laterality Date     SECTION      x1    CHOLECYSTECTOMY      COLONOSCOPY  2020    Dr. Leonid Jones    COLONOSCOPY N/A 2020    COL[facility] at [facility]      bilateral     Scheduled Meds:   sodium chloride flush  10 mL IntraVENous 2 times per day    enoxaparin  40 mg SubCUTAneous Daily    aspirin  81 mg Oral Daily    Or    aspirin  300 mg Rectal Daily    atorvastatin  40 mg Oral Nightly    sertraline  25 mg Oral Daily     Continuous Infusions:   sodium chloride       PRN Meds:.perflutren lipid microspheres, sodium chloride flush, sodium chloride, ondansetron **OR** ondansetron, melatonin, acetaminophen    Medications Prior to Admission: melatonin 3 MG TABS tablet, Take 3 mg by mouth nightly as needed  sertraline (ZOLOFT) 50 MG tablet, TAKE 1 TABLET BY MOUTH IN  THE EVENING  simvastatin (ZOCOR) 20 MG tablet, TAKE 1 TABLET BY MOUTH AT  NIGHT  Cyanocobalamin (B-12 PO), Take 1,000 Units by mouth daily   MAGNESIUM PO, Take 400 mg by mouth daily   Ascorbic Acid (VITAMIN C PO), Take 500 mg by mouth daily   VITAMIN D PO, Take 1,000 Units by mouth daily   aspirin 81 MG chewable tablet, Take 81 mg by mouth daily  Dulaglutide 0.75 MG/0.5ML SOPN, Inject 0.75 mg into the skin once a week    No Known Allergies    Family History   Problem Relation Age of Onset    Breast Cancer Mother 76       Social History     Tobacco Use   Smoking Status Never Smoker   Smokeless Tobacco Never Used     Social History     Substance and Sexual Activity   Drug Use Never     Social History     Substance and Sexual Activity   Alcohol Use Not Currently       ROS:  Constitutional- No weight loss or fevers  Eyes- No diplopia. No photophobia. + blurry vision   Ears/nose/throat- No dysphagia. No Dysarthria  Cardiovascular- No palpitations. No chest pain  Respiratory- No dyspnea. No Cough  Gastrointestinal- No Abdominal pain. No Vomiting. Genitourinary- No incontinence. No urinary retention  Musculoskeletal- No myalgia. +chronic BLE, generalized arthralgia  Skin- No rash. No easy bruising. Psychiatric- +depression. +anxiety  Endocrine- No diabetes. No thyroid issues. Hematologic- No bleeding difficulty. No fatigue  Neurologic- No weakness. +Headache. Exam:  Vitals:    02/24/22 0147 02/24/22 0406 02/24/22 0630 02/24/22 0730   BP:  127/86  129/79   Pulse: 63 64     Resp: 19 18  18   Temp:  97.4 °F (36.3 °C)  98.5 °F (36.9 °C)   TempSrc:  Oral     SpO2:  96%  99%   Weight:   (!) 300 lb 11.3 oz (136.4 kg)    Height:          Constitutional    Vital signs: BP, HR, and RR reviewed   General Alert, no distress, well-nourished  Eyes: unable to visualize the fundi  Cardiovascular: pulses symmetric in all 4 extremities. No peripheral edema. Psychiatric: cooperative with examination, no  psychotic behavior noted.   Neurologic  Mental status:   orientation to person, place, time and situation   General fund of knowledge:  grossly intact   Memory: Short term and long term intact   Attention intact as able to attend well to the exam     Language fluent in conversation   Comprehension intact; follows simple commands  Cranial nerves:   CN2: Visual Fields full w/o extinction on confrontational testing  CN 3,4,6: extraocular muscles intact, PERRLA @ 3mm. Few beats of nystagmus with lateral eye movements. CN5: V1: V2: V3: intact to light touch and pinprick sensation bilaterally  CN7: upper and lower facial symmetric without dysarthria  CN8: hearing grossly intact to conversation. CN9/10: palate elevated symmetrically  CN11: trap full strength on shoulder shrug bilaterally, SCM without weakness. CN12: tongue midline with protrusion. Able to move tongue side to side. Strength: Grasp: BUE 5/5 . RUE: 5/5 Shoulder abduction, elbow flexion, elbow extension. LUE: 5/5  Shoulder abduction, elbow flexion, elbow extension. Dorsiflexion: R 5/5, L 5/5 ;  Plantar flexion: R 5/5, L 5/5  RLE: 5/5 Hip flexion, Knee flexion, Knee extension. LLE: 5/5 Hip flexion, Knee flexion, Knee extension. Deep tendon reflexes:   Present-decreased throughout. No convincing hyperreflexia. Sensory: light touch intact in all 4 extremities. Pinprick sensation intact in all extremities. No sensory extinction on double simultaneous stimulation  Cerebellar/coordination: finger nose finger normal without ataxia  Tone: normal in all 4 extremities  Gait: Normal .     Labs  Na: 139  K: 4.1  BUN: 13  Creatinine: 0.7  Glucose: 99  Calcium: 9.8    Troponin: <0.01    HbA1c: 5.6  LDL: 104  TSH: 3.2    ALT: 16  AST: 20    WBC: 7.3  RBC: 4.68  Hgb: 13.7  Hct: 40.7  Platelet: 213    UA: Negative for nitrites and leukocyte esterase. Studies  MRI Brain w/o 2/24/22: Independently reviewed. 1. There appears to be a punctate subacute infarct within the left frontal lobe as well as the right parietal lobe. 2. Otherwise, no acute intracranial abnormality. No evidence of an acute infarct. 3. A few scattered foci of T2 FLAIR hyperintensity are seen within the supratentorial white matter, which are nonspecific. Diagnostic considerations include early chronic microvascular ischemic changes,sequelae of chronic migraines, demyelinating lesions or perhaps vasculitis. CT Head w/o 2/23/22: No acute intracranial abnormality. CTA Head & Neck w/ 2/23/22: Unremarkable CTA of the head and neck. TTE 2/24/22: pending read. EKG: Normal sinus rhythm. Chest Xray 2/23/22: Stable cardiomegaly.  No acute pulmonary findings    Impression  1. Acute persistent left sided paresthesias  2. Multifocal subacute ischemic stroke. 3. Abnormal Brain MRI: Flair hyperintense lesions, non specific. 4. Family hx of young stroke. 5. HLD    Brenda Marrero is a 49 y.o. female with hx of HLD, family hx of young stroke who presented with acute predominently left arm and leg paresthesias, may have mild left lower facial involvement. MRI Brain w/o was revealing for subacute stroke to left frontal and right parietal lobe which does not explain the patients symptoms or timeline. Non specific T2 FLAIR lesions of uncertain significance. Clinical exam non focal. She reports her left hemiparesthesias persist with intact light touch and pinprick sensation on exam. No hyperreflexia     Etiology: Not clear. Given face involvement would expect central lesion, though would evaluate for cervical etiology given her predominant symptoms are in her arm and leg, complaints of neck pain. Recommendations  - MRI Cervical Spine w/o (ordered)   - TTE pending read, will need bubble study if not completed. - Will send hypercoagulation studies. (Factor 5 and 8, Homocystein, lupus anticoagulant, Protein C & S functional, PT Gene mutation, DRVVT Lupus like, Cardiolipin anti IGA, IGG, IGM; Beta 2 glycoprotein anti, Antithrombin panel, SPEP (ordered)  - Continue 81mg ASA, discussed importance of compliance. - Statin therapy, recommend dose increase with LDL goal <70 given evidence of prior stroke. - Maintain good control of secondary vascular risk factors with LDL < 70, HbA1c < 7, Long term BP goal < 140/90  - Telemetry monitoring while inpatient.  30 day event monitor on discharge.    - PT, OT, SLP as needed, per protocol.   -  Neurology attending will see this afternoon.      Annette Bernard, [address] Neurology    A copy of this note was provided for Dr Kelley Bashir MD

## 2022-02-24 NOTE — PROGRESS NOTES
Pt arrived via stretcher from ED to room 5117. Heart monitor connected and verified with [facility]. VS, assessment, and admission complete. 4 eyes assessment complete. Pt oriented to unit and room. Call light and bedside table in reach. NIHSS 1. All questions answered. Pt resting quietly in bed with no complaints or voiced needs at this time.  Electronically signed by Oanh Moreno RN on 2/23/2022 at 9:47 PM

## 2022-02-24 NOTE — PROGRESS NOTES
Occupational Therapy   Occupational Therapy Initial Assessment and Discharge    Date: 2022   Patient Name: Saritha Moreno  MRN: [id number]     : 1970    Date of Service: 2022    Discharge Recommendations: Saritha Moreno scored a 24/24 on the AM-PAC ADL Inpatient form. At this time, no further OT is recommended upon discharge due to pt functioning at/close to baseline. Recommend patient returns to prior setting with prior services. Home independently  OT Equipment Recommendations  Equipment Needed: No    Assessment   Assessment: Patient is a 50 YO White F with PMH of HLD, Borderline DM, TIA on ASA, depression who presents to hospital for left sided numbness. fpr 1 day. Started while sitting, patient is complaining of facial numbness as well as numbness of her left upper and lower extremity. Her symptoms have persisted forcing her to seek care. Apart from the numbness she denies any fevers chills nausea vomiting chest pain or shortness of breath. She does not have any focal deficits. Denied fever, chills, nausea, vomiting, diarrhea. PTA pt from home with  where pt was Ind with mobility and ADLs. Pt currently functionally at/close to baseline completing mobility and transfers Ind. Anticipate pt Ind with ADLs. Pt presents with 5/5 B UE strength and WFL coordination. Pt does reports altered sensation in L UE and LE although improved. Pt with no ongoing OT needs at this time. D/C from OT. Prognosis: Good  Decision Making: Low Complexity  Exam: see above  OT Education: OT Role;Plan of Care;Transfer Training  REQUIRES OT FOLLOW UP: No  Activity Tolerance  Activity Tolerance: Patient Tolerated treatment well  Safety Devices  Safety Devices in place: Yes  Type of devices: Call light within reach;Nurse notified; Left in bed           Patient Diagnosis(es): There were no encounter diagnoses. has a past medical history of Arthritis, Cancer ([facility] [address] Depression, and Hyperlipidemia.    has a past surgical history that includes Cholecystectomy; Endometrial ablation;  section; Colonoscopy (2020); eye surgery; and Colonoscopy (N/A, 2020). Restrictions       Subjective   General  Chart Reviewed: Yes  Patient assessed for rehabilitation services?: Yes  Additional Pertinent Hx: per MD note, Patient is a 48 YO White F with PMH of HLD, Borderline DM, TIA on ASA, depression who presents to hospital for left sided numbness. fpr 1 day. Started while sitting, patient is complaining of facial numbness as well as numbness of her left upper and lower extremity. Her symptoms have persisted forcing her to seek care. Apart from the numbness she denies any fevers chills nausea vomiting chest pain or shortness of breath. She does not have any focal deficits. Denied fever, chills, nausea, vomiting, diarrhea. Family / Caregiver Present: No  Referring Practitioner: Giovanni Davenport MD  Subjective  Subjective: Pt agreeable to OT evaluation. Pt reports no pain. Pt reports sensation improved although still altered. General Comment  Comments: okay for therapy per RN.     Social/Functional History  Social/Functional History  Lives With: Spouse  Type of Home: House  Home Layout: One level,Laundry in basement  Home Access: Stairs to enter without rails  Entrance Stairs - Number of Steps: 2  Bathroom Shower/Tub: Walk-in shower  Bathroom Toilet: Handicap height  ADL Assistance: Independent  Homemaking Assistance: Independent  Homemaking Responsibilities: Yes  Ambulation Assistance: Independent  Transfer Assistance: Independent  Active : Yes  Occupation: Full time employment  Type of occupation: clerical work  Additional Comments: no recent falls       Objective   Vision: Impaired  Vision Exceptions: Wears glasses for reading  Hearing: Within functional limits    Orientation  Overall Orientation Status: Within Functional Limits  Orientation Level: Oriented X4     Balance  Sitting Balance: Independent  Standing Balance: Independent  Functional Mobility  Functional - Mobility Device: No device  Activity: Other (150ft x2)  Assist Level: Independent  Functional Mobility Comments: no LOB; no reports of light headedness or dizziness  Wheelchair Bed Transfers  Wheelchair/Bed - Technique: Ambulating  Equipment Used: Bed  Level of Asssistance: Independent  ADL  Additional Comments: Anticipate pt Ind with all ADLs based on ROM, strength, and balance  Tone RUE  RUE Tone: Normotonic  Tone LUE  LUE Tone: Normotonic  Coordination  Movements Are Fluid And Coordinated: Yes     Bed mobility  Supine to Sit: Independent  Sit to Supine: Independent  Scooting: Independent  Transfers  Sit to stand: Independent  Stand to sit:  Independent     Cognition  Overall Cognitive Status: WFL        Sensation  Overall Sensation Status: Impaired  Light Touch: Partial deficits in the LUE;Partial deficits in the LLE (although pt reports improved although still deficits noted)        LUE AROM (degrees)  LUE AROM : WFL  RUE AROM (degrees)  RUE AROM : WFL  LUE Strength  Gross LUE Strength: WFL  L Shoulder Flex: 5/5  L Shoulder Ext: 5/5  L Elbow Flex: 5/5  L Elbow Ext: 5/5  L Hand General: 5/5  RUE Strength  Gross RUE Strength: WFL  R Shoulder Flex: 5/5  R Shoulder Ext: 5/5  R Elbow Flex: 5/5  R Elbow Ext: 5/5  R Hand General: 5/5                   Plan   Plan  Times per week: D/C from OT      AM-PAC Score        AM-[facility] Inpatient Daily Activity Raw Score: 24 (02/24/22 1042)  AM-PAC Inpatient ADL T-Scale Score : 57.54 (02/24/22 1042)  ADL Inpatient CMS 0-100% Score: 0 (02/24/22 1042)  ADL Inpatient CMS G-Code Modifier : [address] (02/24/22 1042)    Goals  Short term goals  Time Frame for Short term goals: no ongoing OT needs  Short term goal 1: complete mobility and ADLs Ind- goal met 2/24  Patient Goals   Patient goals : return home       Therapy Time   Individual Concurrent Group Co-treatment   Time In 1017         Time Out 1042         Minutes 25         Timed Code Treatment Minutes: 10 Minutes (15 minute eval)       Brinda Boeck, OTR/L

## 2022-02-25 ENCOUNTER — TELEPHONE (OUTPATIENT)
Dept: CARDIOLOGY CLINIC | Age: 52
End: 2022-02-25

## 2022-02-25 VITALS
TEMPERATURE: 98.2 F | OXYGEN SATURATION: 95 % | DIASTOLIC BLOOD PRESSURE: 83 MMHG | WEIGHT: 293 LBS | RESPIRATION RATE: 18 BRPM | BODY MASS INDEX: 41.95 KG/M2 | HEART RATE: 75 BPM | HEIGHT: 70 IN | SYSTOLIC BLOOD PRESSURE: 119 MMHG

## 2022-02-25 LAB
ALBUMIN SERPL-MCNC: 3.8 G/DL (ref 3.1–4.9)
ALPHA-1-GLOBULIN: 0.3 G/DL (ref 0.2–0.4)
ALPHA-2-GLOBULIN: 1 G/DL (ref 0.4–1.1)
BETA GLOBULIN: 1.2 G/DL (ref 0.9–1.6)
FACTOR VIII ACTIVITY: 128 % (ref 50–150)
GAMMA GLOBULIN: 1.2 G/DL (ref 0.6–1.8)
SPE/IFE INTERPRETATION: NORMAL
TOTAL PROTEIN: 7.5 G/DL (ref 6.4–8.2)

## 2022-02-25 PROCEDURE — 6360000002 HC RX W HCPCS: Performed by: INTERNAL MEDICINE

## 2022-02-25 PROCEDURE — 94761 N-INVAS EAR/PLS OXIMETRY MLT: CPT

## 2022-02-25 PROCEDURE — 6370000000 HC RX 637 (ALT 250 FOR IP): Performed by: INTERNAL MEDICINE

## 2022-02-25 RX ORDER — ATORVASTATIN CALCIUM 40 MG/1
40 TABLET, FILM COATED ORAL NIGHTLY
Qty: 30 TABLET | Refills: 1 | Status: SHIPPED | OUTPATIENT
Start: 2022-02-25 | End: 2022-03-07 | Stop reason: SDUPTHER

## 2022-02-25 RX ADMIN — SERTRALINE 25 MG: 25 TABLET, FILM COATED ORAL at 08:18

## 2022-02-25 RX ADMIN — ENOXAPARIN SODIUM 40 MG: 100 INJECTION SUBCUTANEOUS at 08:18

## 2022-02-25 RX ADMIN — ASPIRIN 81 MG: 81 TABLET, COATED ORAL at 08:18

## 2022-02-25 ASSESSMENT — PAIN SCALES - WONG BAKER
WONGBAKER_NUMERICALRESPONSE: 0

## 2022-02-25 NOTE — DISCHARGE INSTR - COC
Continuity of Care Form    Patient Name: Evon Baxter   :  1970  MRN:  [id number]    Admit date:  2022  Discharge date:  ***    Code Status Order: Full Code   Advance Directives:      Admitting Physician:  Alfredo Ibrahim MD  PCP: Irlanda Patel MD    Discharging Nurse: [facility] Unit/Room#: L5Q-8406/9963-55  Discharging Unit Phone Number: ***    Emergency Contact:   Extended Emergency Contact Information  Primary Emergency Contact: Zayra Coley  Address: [address] [place] [address], [address] [place] [address] [address] Phone: [phone]  Work Phone: [phone]  Mobile Phone: [phone]  Relation: Spouse  Secondary Emergency Contact: Lev Parra  [place] Phone: [phone]  Relation: Other    Past Surgical History:  Past Surgical History:   Procedure Laterality Date     SECTION      x1    CHOLECYSTECTOMY      COLONOSCOPY  2020    Dr. Emily Mcmullen    COLONOSCOPY N/A 2020    COLONOSCOPY performed by Makenna Mahan MD at [place] [address]      bilateral       Immunization History:   Immunization History   Administered Date(s) Administered    COVID-19, Pfizer Purple top, DILUTE for use, 12+ yrs, 30mcg/0.3mL dose 2021, 2021, 11/10/2021    Flu 18 Yrs Intradermal, Preservative Free 10/03/2012    Influenza 10/10/2013    Influenza Virus Vaccine 2014    Influenza, MDCK Quadv, IM, PF (Flucelvax 2 yrs and older) 10/08/2021    Influenza, Quadv, IM, (6 mo and older Fluzone, Flulaval, Fluarix and 3 yrs and older Afluria) 2016, 10/06/2017    Influenza, Quadv, IM, PF (6 mo and older Fluzone, Flulaval, Fluarix, and 3 yrs and older Afluria) 2019    Influenza, Quadv, Recombinant, IM PF (Flublok 18 yrs and older) 10/05/2018    Tdap (Boostrix, Adacel) 10/04/2012       Active Problems:  Patient Active Problem List   Diagnosis Code    Hyperlipidemia E78.5    Depression F32. A    Gilbert's disease E80.4 Gastroesophageal reflux disease with esophagitis K21.00    History of TIA (transient ischemic attack) Z86.73    Morbid obesity with BMI of 40.0-44.9, adult (HCC) E66.01, D05.88    Metabolic syndrome C83.92    Numbness R20.0       Isolation/Infection:   Isolation            No Isolation          Patient Infection Status       Infection Onset Added Last Indicated Last Indicated By Review Planned Expiration Resolved Resolved By    None active    Resolved    COVID-19 11/09/20 11/10/20 11/09/20 COVID-19   20     COVID-19 (Rule Out) 20 COVID-19 Ambulatory (Ordered)   20 Rule-Out Test Resulted            Nurse Assessment:  Last Vital Signs: /83   Pulse 75   Temp 98.2 °F (36.8 °C) (Oral)   Resp 18   Ht 5' 10\" (1.778 m)   Wt 295 lb 6.7 oz (134 kg)   SpO2 95%   BMI 42.39 kg/m²     Last documented pain score (0-10 scale): Pain Level: 2  Last Weight:   Wt Readings from Last 1 Encounters:   22 295 lb 6.7 oz (134 kg)     Mental Status:  {IP PT MENTAL STATUS:}    IV Access:  { ROSELINE IV ACCESS:545525468}    Nursing Mobility/ADLs:  Walking   {CHP DME GNJO:109096113}  Transfer  {CHP DME RWVZ:862621071}  Bathing  {CHP DME TBLX:684384825}  Dressing  {CHP DME QFVP:269324639}  Toileting  {CHP DME XNOR:703403176}  Feeding  {CHP DME QPI}  Med Admin  {CHP DME PSEM:199477192}  Med Delivery   { ROSELINE MED Delivery:630528766}    Wound Care Documentation and Therapy:        Elimination:  Continence: Bowel: {YES / RI:41901}  Bladder: {YES / JH:65734}  Urinary Catheter: {Urinary Catheter:829593504}   Colostomy/Ileostomy/Ileal Conduit: {YES / GX:11163}       Date of Last BM: ***    Intake/Output Summary (Last 24 hours) at 2022 1329  Last data filed at 2022 2015  Gross per 24 hour   Intake 240 ml   Output --   Net 240 ml     I/O last 3 completed shifts:   In: 5 [P.O.:790]  Out: -     Safety Concerns:     [address] Concerns:425552544}    Impairments/Disabilities:      [address] Impairments/Disabilities:048384852}    Nutrition Therapy:  Current Nutrition Therapy:   [address] Diet List:664608420}    Routes of Feeding: {CHP DME Other Feedings:757676600}  Liquids: {Slp liquid thickness:32050}  Daily Fluid Restriction: {CHP DME Yes amt example:443592949}  Last Modified Barium Swallow with Video (Video Swallowing Test): {Done Not Done EAOK:892590772}    Treatments at the Time of Hospital Discharge:   Respiratory Treatments: ***  Oxygen Therapy:  {Therapy; copd oxygen:92882}  Ventilator:    {MH CC Vent THSM:210472449}    Rehab Therapies: {THERAPEUTIC INTERVENTION:0710615674}  Weight Bearing Status/Restrictions: [address]  Weight Bearin}  Other Medical Equipment (for information only, NOT a DME order):  {EQUIPMENT:303817249}  Other Treatments: ***    Patient's personal belongings (please select all that are sent with patient):  {CHP DME Belongings:654738803}    RN SIGNATURE:  {Esignature:073248704}    CASE MANAGEMENT/SOCIAL WORK SECTION    Inpatient Status Date: ***    Readmission Risk Assessment Score:  Readmission Risk              Risk of Unplanned Readmission:  0           Discharging to Facility/ Agency   Name:   Address:  Phone:  Fax:    Dialysis Facility (if applicable)   Name:  Address:  Dialysis Schedule:  Phone:  Fax:    / signature: {Esignature:560275006}    PHYSICIAN SECTION    Prognosis: {Prognosis:5565195031}    Condition at Discharge: [address] Patient Condition:546918099}    Rehab Potential (if transferring to Rehab): {Prognosis:2797616871}    Recommended Labs or Other Treatments After Discharge: ***    Physician Certification: I certify the above information and transfer of Zara Moreno  is necessary for the continuing treatment of the diagnosis listed and that she requires {Admit to Appropriate Level of Care:89365} for {GREATER/LESS:579389531} 30 days.      Update Admission H&P: {CHP DME Changes in OFEOR:232277318}    PHYSICIAN SIGNATURE:  {Esignature:272646022}

## 2022-02-25 NOTE — DISCHARGE SUMMARY
effort. Clear to auscultation, bilaterally without Rales/Wheezes/Rhonchi. Cardiovascular:  Regular rate and rhythm with normal S1/S2 without murmurs, rubs or gallops. Abdomen: Soft, non-tender, non-distended with normal bowel sounds. Musculoskeletal:  No clubbing, cyanosis or edema bilaterally. Full range of motion without deformity. Skin: Skin color, texture, turgor normal.  No rashes or lesions. Neurologic:  Neurovascularly intact without any focal sensory/motor deficits. Cranial nerves: II-XII intact, grossly non-focal.  Psychiatric:  Alert and oriented, thought content appropriate, normal insight  Capillary Refill: Brisk,< 3 seconds   Peripheral Pulses: +2 palpable, equal bilaterally       Labs: For convenience and continuity at follow-up the following most recent labs are provided:      CBC:    Lab Results   Component Value Date    WBC 7.3 02/24/2022    HGB 13.7 02/24/2022    HCT 40.7 02/24/2022     02/24/2022       Renal:    Lab Results   Component Value Date     02/24/2022    K 4.1 02/24/2022     02/24/2022    CO2 26 02/24/2022    BUN 13 02/24/2022    CREATININE 0.7 02/24/2022    CALCIUM 9.8 02/24/2022         Significant Diagnostic Studies    Radiology:   MRI CERVICAL SPINE WO CONTRAST   Final Result   At C3-C4, moderate bilateral neural foraminal narrowing. At C4-C5, severe left facet arthropathy and moderate left neural foraminal   narrowing      At C5-C6, grade 1 anterolisthesis, severe left facet arthropathy, moderate   left neural foraminal narrowing. MRI brain without contrast   Final Result   1. There appears to be a punctate subacute infarct within the left frontal   lobe as well as the right parietal lobe. 2. Otherwise, no acute intracranial abnormality. No evidence of an acute   infarct. 3. A few scattered foci of T2 FLAIR hyperintensity are seen within the   supratentorial white matter, which are nonspecific.  Diagnostic considerations   include early chronic microvascular ischemic changes, sequelae of chronic   migraines, demyelinating lesions or perhaps vasculitis. CT Head WO Contrast   Final Result   1. No acute intracranial abnormality. 2. Unremarkable CTA of the head and neck. CTA HEAD NECK W CONTRAST   Final Result   1. No acute intracranial abnormality. 2. Unremarkable CTA of the head and neck. XR CHEST PORTABLE   Final Result   Stable cardiomegaly. No acute pulmonary findings. Consults:     IP CONSULT TO HOSPITALIST  IP CONSULT TO NEUROLOGY  IP CONSULT TO CASE MANAGEMENT    Disposition: Home    Condition at Discharge: Stable    Discharge Instructions/Follow-up: PCP, neurology    Code Status:  Prior     Activity: activity as tolerated    Diet: cardiac diet      Discharge Medications:     Discharge Medication List as of 2/25/2022 10:52 AM           Details   atorvastatin (LIPITOR) 40 MG tablet Take 1 tablet by mouth nightly, Disp-30 tablet, R-1Normal              Details   melatonin 3 MG TABS tablet Take 3 mg by mouth nightly as neededHistorical Med      sertraline (ZOLOFT) 50 MG tablet TAKE 1 TABLET BY MOUTH IN  THE EVENING, Disp-90 tablet, R-3Requesting 1 year supplyNormal      Dulaglutide 0.75 MG/0.5ML SOPN Inject 0.75 mg into the skin once a week, Disp-4 pen, R-1Normal      Cyanocobalamin (B-12 PO) Take 1,000 Units by mouth daily Historical Med      MAGNESIUM PO Take 400 mg by mouth daily Historical Med      Ascorbic Acid (VITAMIN C PO) Take 500 mg by mouth daily Historical Med      VITAMIN D PO Take 1,000 Units by mouth daily Historical Med      aspirin 81 MG chewable tablet Take 81 mg by mouth dailyHistorical Med             Time Spent on discharge is more than 30 minutes in the examination, evaluation, counseling and review of medications and discharge plan. Signed:    Karen Parker MD   2/25/2022      Thank you Odalis Saucedo MD for the opportunity to be involved in this patient's care.  If you have any questions or concerns please feel free to contact me at [phone].     This chart was likely completed using voice recognition technology and may contain unintended grammatical , phraseology,and/or punctuation errors'

## 2022-02-25 NOTE — TELEPHONE ENCOUNTER
Kailash Alejandro RN brought 30 day MCOT Biot[employer] monitor to inpatient room 5111. I enrolled patient online thru [address] [facility].    Serial number [id number]

## 2022-02-25 NOTE — PROGRESS NOTES
Prepared patient for discharge. Reviewed medications and instructions. Patient verbalized understanding.

## 2022-02-25 NOTE — CARE COORDINATION
CASE MANAGEMENT DISCHARGE SUMMARY: Discharge order noted. No needs identified.      DISCHARGE DATE: 2/25/22    DISCHARGED TO HOME     TRANSPORTATION: Family             TIME: Afternoon     PREFERRED PHARMACY: [address] [place] [address]               Kev MICHEL RN  Case Management  [phone]    Electronically signed by Kev Freeman RN on 2/25/2022 at 1:30 PM

## 2022-02-26 LAB
ANTICARDIOLIPIN IGG ANTIBODY: <10 GPL
BETA-2 GLYCOPROTEIN 1 IGG ANTIBODY: <10 SGU
BETA-2 GLYCOPROTEIN 1 IGM ANTIBODY: <10 SMU
CARDIOLIPIN AB IGM: 10 MPL

## 2022-02-27 LAB
ANTITHROMBIN ACTIVITY: 122 % (ref 76–128)
ANTITHROMBIN ANTIGEN: 105 % (ref 82–136)
DRVVT CONFIRMATION TEST: ABNORMAL RATIO
DRVVT SCREEN: 39 SEC (ref 33–44)
DRVVT,DIL: ABNORMAL SEC (ref 33–44)
HEXAGONAL PHOSPHOLIPID NEUTRALIZAT TEST: ABNORMAL
LUPUS ANTICOAG INTERP: ABNORMAL
PLT NEUTA: ABNORMAL
PROTEIN C FUNCTIONAL: 237 % (ref 83–168)
PROTEIN S, FUNCTIONAL: 115 % (ref 57–131)
PT D: 13.1 SEC (ref 12–15.5)
PTT D: 51 SEC (ref 32–48)
PTT-D CORR REFLEX: 42 SEC (ref 32–48)
PTT-HEPARIN NEUTRALIZED: ABNORMAL SEC (ref 32–48)
REPTILASE TIME: ABNORMAL SEC
THROMBIN TIME: 16 SEC (ref 14.7–19.5)

## 2022-02-28 LAB
FACTOR V LEIDEN: NEGATIVE
PROTHROMBIN G20210A MUTATION: NEGATIVE
PT PCR SPECIMEN: NORMAL
SPECIMEN: NORMAL

## 2022-03-06 PROBLEM — M54.12 LEFT CERVICAL RADICULOPATHY: Status: ACTIVE | Noted: 2022-03-06

## 2022-03-06 NOTE — PROGRESS NOTES
3/7/2022    Brenda Marrero (:  1970) is a 49 y.o. female, here for evaluation of the following chief complaint(s):  Follow-Up from Hospital      ASSESSMENT/PLAN:     Diagnosis Orders   1. History of TIA (transient ischemic attack)  ELROY Young MD, Oncology, [facility]    pt on asa and is refered to Hematology for evauation and advice as to anticoagullation needs moving forward; new RX Lipitor 40   2. Left cervical radiculopathy  ELROY Saucedo MD, Neurosurgery, [facility]    referred Neurosurg   3. Metabolic syndrome  POCT glycosylated hemoglobin (Hb A1C)    a1c == 5.8       No follow-ups on file. An electronic signature was used to authenticate this note. @SIG@    SUBJECTIVE/OBJECTIVE:  (NOTE : prior results listed below reviewed at this visit to assist in medical decision making.)    HPI / ROS    # TIA  Went to ED. For left side weakness. Imaging and labs reviewed. She has moderately severe changes to c spine which may benefit from neurosurgery eval and also abnormal coag testing was noted. Saw Dr. Marthann Cogan Neurology who voiced similar to my opinion that major problem is c-spine    Monitored 2 + days in hospital voces no arrhythmia; on Monitor for now      Elevated Protein C functional at 237% normal; homocysteine level elevated at 14; Impression   1. No acute intracranial abnormality. 2. Unremarkable CTA of the head and neck. Impression   1. There appears to be a punctate subacute infarct within the left frontal   lobe as well as the right parietal lobe. 2. Otherwise, no acute intracranial abnormality.  No evidence of an acute   infarct. 3. A few scattered foci of T2 FLAIR hyperintensity are seen within the   supratentorial white matter, which are nonspecific.  Diagnostic considerations   include early chronic microvascular ischemic changes, sequelae of chronic   migraines, demyelinating lesions or perhaps vasculitis.     Impression   At C3-C4, moderate bilateral neural foraminal narrowing.       At C4-C5, severe left facet arthropathy and moderate left neural foraminal   narrowing       At C5-C6, grade 1 anterolisthesis, severe left facet arthropathy, moderate   left neural foraminal narrowing.         Lab Results   Component Value Date    LABA1C 5.8 03/07/2022     Lab Results   Component Value Date    .0 02/24/2022       Lab Results   Component Value Date    LABA1C 5.8 03/07/2022     Lab Results   Component Value Date    .0 02/24/2022         Wt Readings from Last 3 Encounters:   03/07/22 300 lb (136.1 kg)   02/25/22 295 lb 6.7 oz (134 kg)   10/08/21 298 lb (135.2 kg)       BP Readings from Last 3 Encounters:   03/07/22 135/85   02/25/22 119/83   10/08/21 (!) 120/94       PHYSICAL EXAM  Vitals:    03/07/22 0900   BP: 135/85   Site: Left Upper Arm   Position: Sitting   Cuff Size: Medium Adult   Pulse: 79   Resp: 18   Temp: 97.1 °F (36.2 °C)   TempSrc: Oral   SpO2: 98%   Weight: 300 lb (136.1 kg)   Height: 5' 10\" (1.778 m)     A&o  nonfocal good group bilat  CCra reg no M  Lungs cat

## 2022-03-07 ENCOUNTER — OFFICE VISIT (OUTPATIENT)
Dept: FAMILY MEDICINE CLINIC | Age: 52
End: 2022-03-07
Payer: COMMERCIAL

## 2022-03-07 VITALS
RESPIRATION RATE: 18 BRPM | WEIGHT: 293 LBS | SYSTOLIC BLOOD PRESSURE: 135 MMHG | HEART RATE: 79 BPM | TEMPERATURE: 97.1 F | DIASTOLIC BLOOD PRESSURE: 85 MMHG | OXYGEN SATURATION: 98 % | BODY MASS INDEX: 41.95 KG/M2 | HEIGHT: 70 IN

## 2022-03-07 DIAGNOSIS — M54.12 LEFT CERVICAL RADICULOPATHY: ICD-10-CM

## 2022-03-07 DIAGNOSIS — Z86.73 HISTORY OF TIA (TRANSIENT ISCHEMIC ATTACK): Primary | ICD-10-CM

## 2022-03-07 DIAGNOSIS — E88.81 METABOLIC SYNDROME: ICD-10-CM

## 2022-03-07 LAB — HBA1C MFR BLD: 5.8 %

## 2022-03-07 PROCEDURE — 1111F DSCHRG MED/CURRENT MED MERGE: CPT | Performed by: FAMILY MEDICINE

## 2022-03-07 PROCEDURE — 99214 OFFICE O/P EST MOD 30 MIN: CPT | Performed by: FAMILY MEDICINE

## 2022-03-07 PROCEDURE — 83036 HEMOGLOBIN GLYCOSYLATED A1C: CPT | Performed by: FAMILY MEDICINE

## 2022-03-07 RX ORDER — ATORVASTATIN CALCIUM 40 MG/1
40 TABLET, FILM COATED ORAL NIGHTLY
Qty: 90 TABLET | Refills: 3 | Status: SHIPPED | OUTPATIENT
Start: 2022-03-07 | End: 2022-10-01

## 2022-03-07 ASSESSMENT — PATIENT HEALTH QUESTIONNAIRE - PHQ9
SUM OF ALL RESPONSES TO PHQ QUESTIONS 1-9: 4
2. FEELING DOWN, DEPRESSED OR HOPELESS: 1
SUM OF ALL RESPONSES TO PHQ QUESTIONS 1-9: 4
1. LITTLE INTEREST OR PLEASURE IN DOING THINGS: 1
SUM OF ALL RESPONSES TO PHQ QUESTIONS 1-9: 4
9. THOUGHTS THAT YOU WOULD BE BETTER OFF DEAD, OR OF HURTING YOURSELF: 0
1. LITTLE INTEREST OR PLEASURE IN DOING THINGS: 1
6. FEELING BAD ABOUT YOURSELF - OR THAT YOU ARE A FAILURE OR HAVE LET YOURSELF OR YOUR FAMILY DOWN: 0
3. TROUBLE FALLING OR STAYING ASLEEP: 1
5. POOR APPETITE OR OVEREATING: 0
8. MOVING OR SPEAKING SO SLOWLY THAT OTHER PEOPLE COULD HAVE NOTICED. OR THE OPPOSITE, BEING SO FIGETY OR RESTLESS THAT YOU HAVE BEEN MOVING AROUND A LOT MORE THAN USUAL: 0
4. FEELING TIRED OR HAVING LITTLE ENERGY: 1
SUM OF ALL RESPONSES TO PHQ9 QUESTIONS 1 & 2: 2
SUM OF ALL RESPONSES TO PHQ QUESTIONS 1-9: 2
7. TROUBLE CONCENTRATING ON THINGS, SUCH AS READING THE NEWSPAPER OR WATCHING TELEVISION: 0
10. IF YOU CHECKED OFF ANY PROBLEMS, HOW DIFFICULT HAVE THESE PROBLEMS MADE IT FOR YOU TO DO YOUR WORK, TAKE CARE OF THINGS AT HOME, OR GET ALONG WITH OTHER PEOPLE: 0
SUM OF ALL RESPONSES TO PHQ QUESTIONS 1-9: 2
2. FEELING DOWN, DEPRESSED OR HOPELESS: 1
SUM OF ALL RESPONSES TO PHQ QUESTIONS 1-9: 4
SUM OF ALL RESPONSES TO PHQ9 QUESTIONS 1 & 2: 2

## 2022-03-29 PROCEDURE — 93272 ECG/REVIEW INTERPRET ONLY: CPT | Performed by: NURSE PRACTITIONER

## 2022-03-30 ENCOUNTER — TELEPHONE (OUTPATIENT)
Dept: CARDIOLOGY CLINIC | Age: 52
End: 2022-03-30

## 2022-03-30 ENCOUNTER — TELEPHONE (OUTPATIENT)
Dept: FAMILY MEDICINE CLINIC | Age: 52
End: 2022-03-30

## 2022-03-30 NOTE — TELEPHONE ENCOUNTER
Spoke with patient advised of monitor results and recommendation to see Dr Sydni Paris to discuss ILR patient agreeable.   Scheduled to see Dr. Carmella Paris on 4/4/2022 at 9 am.

## 2022-03-30 NOTE — TELEPHONE ENCOUNTER
Pt states that she will be by tomorrow. Also asks for dr Alfa Cheney to address 2nd question. also pt states that her cardiologist, Dr. Giacomo Gaines wants to put in an electric monitoring device behind her heart, even though her heart monitor she wore came back good. Pt states doctor says it is because of her TIA symptoms.

## 2022-03-30 NOTE — TELEPHONE ENCOUNTER
Pt called in stating that Dr. Deloris Salazar was going to check with drug rep about getting a discount on ozempic 0.50 for Pt and also pt states that her cardiologist, Dr. Randy Clark wants to put in an electric monitoring device behind her heart, even though her heart monitor she wore came back good. Pt states doctor says it is because of her TIA symptoms. Pt is wanting to talk to Dr. Deloris Salazar to get his opinion on this. Please advise.  [phone]

## 2022-03-31 DIAGNOSIS — I63.9 CEREBROVASCULAR ACCIDENT (CVA), UNSPECIFIED MECHANISM (HCC): ICD-10-CM

## 2022-04-01 NOTE — PROGRESS NOTES
Cardiac Electrophysiology Consultation   Date: 2022  Reason for Consultation: CVA  Consult Requesting Physician: Randi Grigsby MD   Primary Care Physician: Randi Grigsby MD    Chief Complaint:   Chief Complaint   Patient presents with    New Patient     Cerebrovascular accident (CVA), unspecified mechanism [facility])        HPI: Nimesh Pretty is a 49 y.o. patient with a history of hyperlipidemia, TIA. She was hospitalized at [facility] [place] [facility] [place] from 2022-2022 after presented to the ED with complaint of left arm numbness and tingling for 1 day. MRI showed punctate subacute infarct within the left frontal lobe as well as right parietal lobe. Patient had MRI cervical spine done showed mild to moderate stenosis. Neurology recommended to discharge on aspirin, statin. Echo without any PFO. She wore FELIBERTO 2022- 3/27/2022 to assess for underlying arrhthymias which may have contributed to her CVA. The monitor showed NSR no arrhythmias seen. Today, she presents to office to discuss ILR implantation. She states she suffered a subacute stroke and MRI showed old infarction. She saw haematologist and she was told it was 4-8 weeks prior to her hospitalization that she suffered the stroke. She states she does not recall having any symptoms from the stroke. She is compliant with her medications and tolerating them well. She denies chest pain/pressure, tightness, edema, shortness of breath, heart racing, palpitations, lightheadedness, dizziness, syncope, presyncope,  PND or orthopnea.        Past Medical History:   Diagnosis Date    Arthritis     Cancer ([address]     basal-skin    Depression     Hyperlipidemia         Past Surgical History:   Procedure Laterality Date     SECTION      x1    CHOLECYSTECTOMY      COLONOSCOPY  2020    Dr. Abbey Goodpasture N/A 2020    COLONOSCOPY performed by Sofia Cotto MD at [facility]      bilateral       Allergies:  No Known Allergies    Medication:   Prior to Admission medications    Medication Sig Start Date End Date Taking? Authorizing Provider   atorvastatin (LIPITOR) 40 MG tablet Take 1 tablet by mouth nightly 3/7/22 6/5/22 Yes Ngoc Julian MD   melatonin 3 MG TABS tablet Take 3 mg by mouth nightly as needed   Yes Historical Provider, MD   sertraline (ZOLOFT) 50 MG tablet TAKE 1 TABLET BY MOUTH IN  THE EVENING 2/14/22  Yes Ngoc Julian MD   Dulaglutide 0.75 MG/0.5ML SOPN Inject 0.75 mg into the skin once a week 2/10/22  Yes Jesika Blanco, APRN - CNP   Cyanocobalamin (B-12 PO) Take 1,000 Units by mouth daily    Yes Historical Provider, MD   MAGNESIUM PO Take 400 mg by mouth daily    Yes Historical Provider, MD   Ascorbic Acid (VITAMIN C PO) Take 500 mg by mouth daily    Yes Historical Provider, MD   VITAMIN D PO Take 1,000 Units by mouth daily    Yes Historical Provider, MD   aspirin 81 MG chewable tablet Take 81 mg by mouth daily   Yes Historical Provider, MD       Social History:   reports that she has never smoked. She has never used smokeless tobacco. She reports previous alcohol use. She reports that she does not use drugs. Family History:  family history includes Breast Cancer (age of onset: 76) in her mother. Reviewed.  Denies family history of sudden cardiac death, arrhythmia, premature CAD    Review of System:    · General ROS: negative for - chills, fever   · Psychological ROS: negative for - anxiety or depression  · Ophthalmic ROS: negative for - eye pain or loss of vision  · ENT ROS: negative for - epistaxis, headaches, nasal discharge, sore throat   · Allergy and Immunology ROS: negative for - hives, nasal congestion   · Hematological and Lymphatic ROS: negative for - bleeding problems, blood clots, bruising or jaundice  · Endocrine ROS: negative for - skin changes, temperature intolerance or unexpected weight changes  · Respiratory ROS: negative for - cough, hemoptysis, pleuritic pain, SOB, sputum changes or wheezing  · Cardiovascular ROS: Per HPI. · Gastrointestinal ROS: negative for - abdominal pain, blood in stools, diarrhea, hematemesis, melena, nausea/vomiting or swallowing difficulty/pain  · Genito-Urinary ROS: negative for - dysuria or incontinence  · Musculoskeletal ROS: negative for - joint swelling or muscle pain  · Neurological ROS: negative for - confusion, dizziness, gait disturbance, headaches, numbness/tingling, seizures, speech problems, tremors, visual changes or weakness  · Dermatological ROS: negative for - rash    Physical Examination:  Vitals:    04/04/22 0849   BP: 122/82   Pulse: 80   SpO2: 95%       · Constitutional: Oriented. No distress. · Head: Normocephalic and atraumatic. · Mouth/Throat: Oropharynx is clear and moist.   · Eyes: Conjunctivae normal. EOM are normal.   · Neck: Normal range of motion. Neck supple. No rigidity. No JVD present. · Cardiovascular: Normal rate, regular rhythm, S1&S2 and intact distal pulses. · Pulmonary/Chest: Bilateral respiratory sounds. No wheezes. No rhonchi. · Abdominal: Soft. Bowel sounds present. No distension, No tenderness. · Musculoskeletal: No tenderness. No edema    · Lymphadenopathy: Has no cervical adenopathy. · Neurological: Alert and oriented. Cranial nerve appears intact, No Gross deficit   · Skin: Skin is warm and dry. No rash noted. · Psychiatric: Has a normal mood, affect and behavior     Labs:  Reviewed. ECG: Sinus  rhythm with v-rate of 66 bpm with QRS duration 92 ms. No pathologic Q waves, ventricular pre-excitation, or QT prolongation. Studies:   1. Event monitor:  2/26/2022- 3/27/2022  NSR no arrhythmias seen    2. Echo: 2/23/2022  Summary  Normal left ventricle size, wall thickness, and systolic function with anestimated ejection fraction of 55-60%. No regional wall motion abnormalities are seen.   The right ventricle is normal in size and function. Trivial mitral and tricuspid regurgitation. A bubble study was performed and fails to show evidence of shunting. LA volume/Index: 51 ml /21 ml/m2    3. Stress Test:  n/a      4. Cath: n/a    I independently reviewed and interpreted the ECG, MCOT, echocardiogram, stress test, and coronary angiography/PCI results and used them for my plan of care. Procedures:  1. Assessment/Plan:       CVA  Denies any residual deficits from CVA. FELIBERTO was worn from 2/26/2022- 3/27/2022 which revealed NSR no arrhythmias seen. -Recommend ILR to access for long term monitoring for arrhythmia that may have contributed to her CVA. We discussed implanting an implantable loop recorder for long-term cardiac dysrhythmia monitoring in order to evaluate for possible atrial fibrillation/atrial flutter as an etiology for the patient's CVA. The benefits and risks of implanting an implantable loop recorder has been discussed with the patient. The risks including, but not limited to, the risks of vascular injury, bleeding, infection, device malfunction, injury to cardiac and surrounding structures (including pneumothorax), stroke, myocardial infarction and death were discussed in detail. The patient was also counseled at length about the risks of emanuel Covid-19 in the jia-operative and post-operative states including the recovery window of their procedure. The patient was made aware that emanuel Covid-19 after a surgical procedure may worsen their prognosis for recovering from the virus and lend to a higher morbidity and or mortality risk. The patient was given the option of postponing their procedure. The patient was also presented reasonable alternatives to the proposed care, treatment, and services.  The discussion I have had with the patient encompassed risks, benefits, and side effects related to the alternatives and the risks related to not receiving the proposed care, treatment and services.     -The patient is aware of and understands the risks and is willing to proceed with the ILR implantation. We will schedule it in the near future. Mixed hyperlipidemia  -Continue statin therapy Atorvastatin 40 mg daily. Follow up 1 week after ILR implantation in the device clinic for site check and device interrogation. She will then follow up yearly with Brad Frank CNP. Thank you for allowing me to participate in the care of Bear Warner. All questions and concerns were addressed to the patient/family. Alternatives to my treatment were discussed. This note was scribed in the presence of Dr. Holly Pandya MD by Lia Mendez RN. The scribe's documentation has been prepared under my direction and personally reviewed by me in its entirety. I confirm that the note above accurately reflects all work, physical examination, the discussion of treatments and procedures, and medical decision making performed by me.     Holly Pandya MD, MS, [facility], [facility]  Cardiac Electrophysiology  [address]  [address] [facility], [address] [place] [address]  [facility] [address]  [phone]

## 2022-04-04 ENCOUNTER — OFFICE VISIT (OUTPATIENT)
Dept: CARDIOLOGY CLINIC | Age: 52
End: 2022-04-04
Payer: COMMERCIAL

## 2022-04-04 VITALS
SYSTOLIC BLOOD PRESSURE: 122 MMHG | OXYGEN SATURATION: 95 % | DIASTOLIC BLOOD PRESSURE: 82 MMHG | WEIGHT: 293 LBS | BODY MASS INDEX: 41.95 KG/M2 | HEART RATE: 80 BPM | HEIGHT: 70 IN

## 2022-04-04 DIAGNOSIS — E78.2 MIXED HYPERLIPIDEMIA: ICD-10-CM

## 2022-04-04 DIAGNOSIS — I63.9 CEREBROVASCULAR ACCIDENT (CVA), UNSPECIFIED MECHANISM (HCC): Primary | ICD-10-CM

## 2022-04-04 PROCEDURE — 99215 OFFICE O/P EST HI 40 MIN: CPT | Performed by: INTERNAL MEDICINE

## 2022-04-04 PROCEDURE — 93000 ELECTROCARDIOGRAM COMPLETE: CPT | Performed by: INTERNAL MEDICINE

## 2022-04-04 NOTE — PATIENT INSTRUCTIONS
If have any questions regarding ILR implantation please contact Dr. Gerald Yoo at [phone]. Loop Implant  Pre Procedure Instructions     Date: 5-    Arrive at:  9:00 am    Procedure time: 10:00 am      The morning of your procedure you will park in the [facility] AT [facility] and report directly to the cath lab to check in. At the information desk stay right and go all the way to the end of the lopes, this will take you directly to your check in desk for the cath lab. Pre-Procedure Instructions   1. Do not eat or drink anything for four hours prior to your procedure. 2. Do not use any lotions, creams or perfume the morning of procedure. 3. Cath lab will provide you with all post procedure instructions. 4. Bring their lauro store password (android or apple) when having a loop recorder insertion. Patient Education        Learning About Implantable Heart Monitors  What is an implantable heart monitor? An implantable heart monitor is a small device placed under the skin of your chest. It records the electrical signals from your heart. A monitor is used to look for irregular heartbeats. It can help your doctor find out what is causing your fainting, lightheadedness, or other symptoms. It also can help your doctorcheck to see if treatment for an irregular heartbeat is working. The monitor may be placed near the middle of your chest. The monitor may beabout the size of a paper clip. These monitors are used if an irregular heart rhythm or your symptoms don't happen very often. They also help your doctor monitor your heart for a longtime. How is the monitor put in place? The monitor is put in during a short surgery. You will get medicine to numb the area of your chest where the monitor will be put in. You will be awake duringthe surgery, but you shouldn't feel any pain. Your doctor will make a small cut and place the monitor under your skin.  Then he or she will close the cut with special tape or glue or with stitches thatwill dissolve. Then the doctor will place a bandage over the cut. The procedure will take about half an hour. You probably will be able to gohome soon after it's done. You may have some minor pain where the cut was made. You will get instructionsfrom your doctor on how to care for it at home. When your doctor says it's safe, you should be able to get back to your normalactivities. How is the monitor used? Your monitor may start recording on its own when it detects an abnormal heartbeat. Or you might use a handheld device to start the monitor when you have symptoms. Your doctor will explain which type of monitor you have and whatyou need to do. Your doctor will tell you how often he or she will need to check your monitor. The information from your monitor may be sent to your doctor automatically. Or you may have to do something to send it. It may be sent over a phone line or online. You will get instructions from your doctor. Your information will stayprivate and secure. You will also have checkups in person. You may need to keep a symptom diary while you have the monitor. This means that you will write down the times you have symptoms and what you were doing when they started. Your doctor will let you know how to do this. He or she can then look at your heart monitor records to see if your heart rhythms changedwhen you had symptoms. After your doctor gets the information he or she needs, the monitor will beremoved from your chest.  What else should you know about living with a heart monitor? You will get a medical ID card with information about your heart monitor. Chay Mancusos with you at all times. Tell all of your doctors that you have this monitor. Some electric devices have a strong electromagnetic field. This field can keep the heart monitor from working right for a short time. Many electric devices do not affect how the monitor works.  You can use them safely. Examples include most office equipment and kitchen appliances. Try to keep things that are powered by electricity or batteries, such as a power tool or a cell phone, at least 6 inches away from the monitor. Tell your doctor if you work [place] [facility]. such as certain welding tools. Your doctor or the maker of your heart monitor can give you a full list ofthings to avoid. When should you call for help? Call your doctor now or seek immediate medical care if:   You have symptoms of infection, such as:  ? Increased pain, swelling, warmth, or redness around the cut.  ? Red streaks leading from the cut.  ? Pus draining from the cut.  ? A fever. Watch closely for changes in your health, and be sure to contact your doctor if:  [place] You have any problems with your heart monitor. Follow-up care is a key part of your treatment and safety. Be sure to make and go to all appointments, and call your doctor if you are having problems. It's also a good idea to know your test results and keep alist of the medicines you take. Where can you learn more? Go to https://[employer] Medicalpepiceweb.[employer]. org and sign in to your Hiri account. Enter J479 in the MiTurno box to learn more about \"Learning About Implantable Heart Monitors. \"     If you do not have an account, please click on the \"Sign Up Now\" link. Current as of: January 10, 2022               Content Version: 13.2  © 2006-2022 Healthwise, Incorporated. Care instructions adapted under license by [facility] ([facility]). If you have questions about a medical condition or this instruction, always ask your healthcare professional. [place] [address] any warranty or liability for your use of this information.          Patient Education        Implantable Heart Monitor Placement: What to Expect at [address] [place] [address] Street heart monitor placement is surgery to put a small heart monitor under the skin of your chest. The doctor put the monitor there to record electrical signals from your heart. The monitor looks for an irregular heartbeat. It also looks for other heart rhythm problems. It can help your doctor find out what's causing your fainting, lightheadedness, or othersymptoms. Your chest may be sore where the doctor made the cut. You should be able to go home soon after surgery. And you should be able to getback to your usual activities. You'll need to take steps to safely use electronic devices. Some of these devices can stop your heart monitor from working right for a short time. Talk with your doctor about this. Learn what to avoid and what to keep a shortdistance away from your heart monitor. This care sheet gives you a general idea about how long it will take for you to recover. But each person recovers at a different pace. Follow the steps belowto get better as quickly as possible. How can you care for yourself at home? Activity     Rest when you feel tired.      You can do your normal activities when it feels okay to do so. Medicines     Ask your doctor if you can take an over-the-counter pain medicine, such as acetaminophen (Tylenol), ibuprofen (Advil, Motrin), or naproxen (Aleve). Be safe with medicines. Read and follow all instructions on the label. Incision care     If you have strips of tape on the incision, leave the tape on for a week or until it falls off.      Wash the area daily with warm water, and pat it dry. Don't use hydrogen peroxide or alcohol. They can slow healing.      You may cover the area with a gauze bandage if it oozes fluid or rubs against clothing.      You may shower 24 to 48 hours after surgery. Pat the incision dry. Don't swim or take a bath for the first 2 weeks, or until your doctor tells you it is okay. Other instructions     Keep a medical ID card with you at all times that says you have a heart monitor. The card should include the  and model information.    Your monitor may start recording on its own when it detects an abnormal heartbeat. Or you might use a handheld device to start the monitor when you have symptoms. Your doctor will explain which type of monitor you have and what you need to do. Follow-up care is a key part of your treatment and safety. Be sure to make and go to all appointments, and call your doctor if you are having problems. It's also a good idea to know your test results and keep alist of the medicines you take. When should you call for help? Call 911anytime you think you may need emergency care. For example, call if:   You passed out (lost consciousness). Call your doctor nowor seek immediate medical care if:   You have pain that does not get better after you take pain medicine.  You are bleeding from the incision.  You have symptoms of infection, such as:  ? Increased pain, swelling, warmth, or redness. ? Red streaks leading from the area. ? Pus draining from the area. ? A fever. Watch closely for changes in your health, and be sure to contact your doctor if:  Blase Liming You have any problems with your heart monitor. Current as of: January 10, 2022               Content Version: 13.2  © 2006-2022 Healthwise, Incorporated. Care instructions adapted under license by [facility] ([facility]). If you have questions about a medical condition or this instruction, always ask your healthcare professional. [address] any warranty or liability for your use of this information.

## 2022-04-08 ENCOUNTER — TELEPHONE (OUTPATIENT)
Dept: FAMILY MEDICINE CLINIC | Age: 52
End: 2022-04-08

## 2022-04-08 DIAGNOSIS — R05.9 COUGH: Primary | ICD-10-CM

## 2022-04-08 RX ORDER — PREDNISONE 1 MG/1
5 TABLET ORAL 2 TIMES DAILY
Qty: 10 TABLET | Refills: 0 | Status: SHIPPED | OUTPATIENT
Start: 2022-04-08 | End: 2022-04-13

## 2022-04-08 RX ORDER — AZITHROMYCIN 250 MG/1
TABLET, FILM COATED ORAL
Qty: 1 PACKET | Refills: 0 | Status: SHIPPED | OUTPATIENT
Start: 2022-04-08 | End: 2022-04-18

## 2022-04-08 NOTE — TELEPHONE ENCOUNTER
Pt is calling because she feels like she is getting a sinus infections. She is asking if something can be called in. Please advise.      Onset-yesterday    Symptoms  Head yoly  Runny nose  Scratchy throat    covid exposure- no  covid test- no    otc- not yet

## 2022-04-17 ENCOUNTER — HOSPITAL ENCOUNTER (EMERGENCY)
Age: 52
Discharge: HOME | DRG: 153 | End: 2022-04-17
Payer: COMMERCIAL

## 2022-04-17 VITALS
HEART RATE: 83 BPM | TEMPERATURE: 97.6 F | RESPIRATION RATE: 18 BRPM | SYSTOLIC BLOOD PRESSURE: 152 MMHG | OXYGEN SATURATION: 97 % | DIASTOLIC BLOOD PRESSURE: 92 MMHG

## 2022-04-17 DIAGNOSIS — S16.1XXA STRAIN OF NECK MUSCLE, INITIAL ENCOUNTER: Primary | ICD-10-CM

## 2022-04-17 DIAGNOSIS — R03.0 ELEVATED BLOOD PRESSURE READING: ICD-10-CM

## 2022-04-17 PROCEDURE — 99284 EMERGENCY DEPT VISIT MOD MDM: CPT

## 2022-04-17 PROCEDURE — 6370000000 HC RX 637 (ALT 250 FOR IP): Performed by: PHYSICIAN ASSISTANT

## 2022-04-17 RX ORDER — HYDROCODONE BITARTRATE AND ACETAMINOPHEN 5; 325 MG/1; MG/1
1 TABLET ORAL ONCE
Status: COMPLETED | OUTPATIENT
Start: 2022-04-17 | End: 2022-04-17

## 2022-04-17 RX ORDER — CYCLOBENZAPRINE HCL 10 MG
10 TABLET ORAL ONCE
Status: COMPLETED | OUTPATIENT
Start: 2022-04-17 | End: 2022-04-17

## 2022-04-17 RX ORDER — CYCLOBENZAPRINE HCL 10 MG
10 TABLET ORAL 3 TIMES DAILY PRN
Qty: 20 TABLET | Refills: 0 | Status: SHIPPED | OUTPATIENT
Start: 2022-04-17 | End: 2022-09-06

## 2022-04-17 RX ORDER — HYDROCODONE BITARTRATE AND ACETAMINOPHEN 5; 325 MG/1; MG/1
1 TABLET ORAL EVERY 6 HOURS PRN
Qty: 10 TABLET | Refills: 0 | Status: SHIPPED | OUTPATIENT
Start: 2022-04-17 | End: 2022-04-20

## 2022-04-17 RX ADMIN — HYDROCODONE BITARTRATE AND ACETAMINOPHEN 1 TABLET: 5; 325 TABLET ORAL at 14:35

## 2022-04-17 RX ADMIN — CYCLOBENZAPRINE HYDROCHLORIDE 10 MG: 10 TABLET, FILM COATED ORAL at 14:35

## 2022-04-17 ASSESSMENT — PAIN DESCRIPTION - LOCATION: LOCATION: NECK

## 2022-04-17 ASSESSMENT — PAIN DESCRIPTION - PAIN TYPE: TYPE: ACUTE PAIN

## 2022-04-17 ASSESSMENT — PAIN DESCRIPTION - DESCRIPTORS: DESCRIPTORS: SORE;SHOOTING

## 2022-04-17 ASSESSMENT — PAIN - FUNCTIONAL ASSESSMENT: PAIN_FUNCTIONAL_ASSESSMENT: 0-10

## 2022-04-17 ASSESSMENT — PAIN DESCRIPTION - FREQUENCY: FREQUENCY: CONTINUOUS

## 2022-04-17 ASSESSMENT — PAIN SCALES - GENERAL: PAINLEVEL_OUTOF10: 6

## 2022-04-17 NOTE — ED PROVIDER NOTES
[address] [facility]        Pt Name: Ammy Borjas  MRN: [id number]  [place] 1970  Date of evaluation: 2022  Provider: UVALDO Gonzales      ADVANCED PRACTICE PROVIDER, I HAVE EVALUATED THIS PATIENT    CHIEF COMPLAINT     Right-sided neck pain      HISTORY OF PRESENT ILLNESS  (Location/Symptom, Timing/Onset, Context/Setting, Quality, Duration,Modifying Factors, Severity.)   Ammy Borjas is a 49 y.o. female who presents to the emergencydepartment for right-sided neck pain that started 3 days ago. She noticed it when she first woke up and thought she had slept on it wrong. Denies any injuries. Pain has persisted. She is taking advil but is not helping. Pain radiates to her right side of her head. Today, she also noted some anterior neck pain with swallowing. No sore throat though. Reports she had a cold recently with mainly congestion and sinus pressure. She was traveling so PCP called in steroids and antibiotics. She denies fever chills nausea vomiting. 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 She denies numbness or tingling in the right arm. Nursing Notes were reviewed and I agree. REVIEW OF SYSTEMS    (2-9 systems for level 4, 10 or more for level 5)   Review of Systems     Pertinent positives and negatives as per HPI.    All other systems reviewed and are negative except as noted    PAST MEDICAL HISTORY         Diagnosis Date    Arthritis     Cancer ([facility] [address]     basal-skin    Depression     Hyperlipidemia        SURGICAL HISTORY         Procedure Laterality Date     SECTION      x1    CHOLECYSTECTOMY      COLONOSCOPY  2020    Dr. Callum Arango N/A 2020    COLONOSCOPY performed by Kayden Fernandez MD at [facility] CURRENT MEDICATIONS       Previous Medications    ASCORBIC ACID (VITAMIN C PO)    Take 500 mg by mouth daily     ASPIRIN 81 MG CHEWABLE TABLET    Take 81 mg by mouth daily    ATORVASTATIN (LIPITOR) 40 MG TABLET    Take 1 tablet by mouth nightly    AZITHROMYCIN (ZITHROMAX Z-ERNA) 250 MG TABLET    As directed    CYANOCOBALAMIN (B-12 PO)    Take 1,000 Units by mouth daily     DULAGLUTIDE 0.75 MG/0.5ML SOPN    Inject 0.75 mg into the skin once a week    MAGNESIUM PO    Take 400 mg by mouth daily     MELATONIN 3 MG TABS TABLET    Take 3 mg by mouth nightly as needed    SERTRALINE (ZOLOFT) 50 MG TABLET    TAKE 1 TABLET BY MOUTH IN  THE EVENING    VITAMIN D PO    Take 1,000 Units by mouth daily        ALLERGIES     Patient has no known allergies. FAMILY HISTORY           Problem Relation Age of Onset    Breast Cancer Mother 76     Family Status   Relation Name Status    Mother  (Not Specified)        SOCIAL HISTORY      reports that she has never smoked. She has never used smokeless tobacco. She reports current alcohol use. She reports that she does not use drugs. PHYSICAL EXAM    (up to 7 for level 4, 8 or more for level 5)     ED Triage Vitals [04/17/22 1338]   BP Temp Temp Source Pulse Resp SpO2 Height Weight   (!) 152/92 97.6 °F (36.4 °C) Oral 83 18 97 % -- --       Physical Exam  Constitutional:       General: She is not in acute distress. Appearance: Normal appearance. She is well-developed. She is not ill-appearing, toxic-appearing or diaphoretic. HENT:      Head: Normocephalic and atraumatic. Neck:      Comments: Mild TTP fo the right cervical paraspinal area. No rash. No TTP cervical midline    Mild anterior cervical LAD with no overlying erythema or edema. Minimal TTP. No submandibular swelling. No swelling or cellulitis under the tongue. No erythema or edema of the right lower gumline  No posterior oropharygneal edema or exudate. No difficulty swallowing handling secretions.   No signs of airway compromise. No trismus. Pulmonary:      Effort: Pulmonary effort is normal. No respiratory distress. Musculoskeletal:         General: Normal range of motion. Cervical back: Neck supple. Neurological:      Mental Status: She is alert. Psychiatric:         Mood and Affect: Mood normal.         Behavior: Behavior normal.         Thought Content: Thought content normal.         Judgment: Judgment normal.         DIFFERENTIAL DIAGNOSIS   Cervical strain, muscle skeletal pain, lymphadenopathy, retropharyngeal mass or infection, cervical radiculopathy, other    DIAGNOSTICRESULTS         RADIOLOGY:   Non-plain film images such as CT, Ultrasound and MRI are read by the radiologist. Plain radiographic images are visualized and preliminarily interpreted by Darreld Ahumada, PA with the below findings:      Interpretation per the Radiologist below, if available at the time of this note:    No orders to display         LABS:  Labs Reviewed - No data to display    All other labs were within normal range or not returned as of this dictation. EMERGENCY DEPARTMENT COURSE and DIFFERENTIALDIAGNOSIS/MDM:   Vitals:    Vitals:    04/17/22 1338   BP: (!) 152/92   Pulse: 83   Resp: 18   Temp: 97.6 °F (36.4 °C)   TempSrc: Oral   SpO2: 97%       Patient wasnontoxic, well appearing, afebrile with normal vital signs with exception of hypertension 152/92. Reviewed discharge summary from admission in February 2022. Presented for numbness in the left arm and leg. MRI showed a subacute infarct on the left frontal lobe and right parietal lobe. MRI cervical spine showed mild to moderate stenosis    Her symptoms today are pain with no numbness or tingling. The pain is in the cervical musculature and is reproducible. It is atraumatic. Do not think imaging is indicated. Will treat with Flexeril, Norco instructed to continue ibuprofen. Follow-up with PCP next few days reevaluation return for worsening.   She agreed understood. PROCEDURES:  None    FINAL IMPRESSION      1. Strain of neck muscle, initial encounter    2. Elevated blood pressure reading        DISPOSITION/PLAN   DISPOSITION Decision To Discharge 04/17/2022 02:27:19 PM      PATIENT REFERRED TO:  Marita Cary MD  [address] [place] [address], [address] [place] [address]  [phone]    Schedule an appointment as soon as possible for a visit in 2 days  for reevaluation and to recheck your blood pressure    [facility] Emergency Department  [address] [place] [address]  [phone]    As needed, If symptoms worsen      DISCHARGE MEDICATIONS:  New Prescriptions    CYCLOBENZAPRINE (FLEXERIL) 10 MG TABLET    Take 1 tablet by mouth 3 times daily as needed for Muscle spasms    HYDROCODONE-ACETAMINOPHEN (NORCO) 5-325 MG PER TABLET    Take 1 tablet by mouth every 6 hours as needed for Pain for up to 3 days.        (Please note that portions ofthis note were completed with a voice recognition program.  Efforts were made to edit the dictations but occasionally words are mis-transcribed.)    Emmanuel Cantu, [address] [place] [address]  04/17/22 0274

## 2022-04-17 NOTE — ED NOTES
D/C: Order noted for d/c. Pt confirmed d/c paperwork and RX have correct name. Discharge and education instructions reviewed with patient. Teach-back successful. Pt verbalized understanding and signed d/c papers. Pt denied questions at this time. No acute distress noted. Patient instructed to follow-up as noted - return to emergency department if symptoms worsen. Patient verbalized understanding. Discharged per EDMD with discharge instructions. Pt discharged to private vehicle. Patient stable upon departure. Thanked patient for choosing [facility] for care. Provider aware of patient pain at time of discharge.      Micheline Stein RN  04/17/22 2031

## 2022-04-18 ENCOUNTER — HOSPITAL ENCOUNTER (INPATIENT)
Age: 52
LOS: 2 days | Discharge: HOME | DRG: 153 | End: 2022-04-20
Attending: EMERGENCY MEDICINE | Admitting: STUDENT IN AN ORGANIZED HEALTH CARE EDUCATION/TRAINING PROGRAM
Payer: COMMERCIAL

## 2022-04-18 ENCOUNTER — APPOINTMENT (OUTPATIENT)
Dept: CT IMAGING | Age: 52
DRG: 153 | End: 2022-04-18
Payer: COMMERCIAL

## 2022-04-18 DIAGNOSIS — J39.0 RETROPHARYNGEAL ABSCESS: Primary | ICD-10-CM

## 2022-04-18 PROBLEM — J39.1 PHARYNGEAL ABSCESS: Status: ACTIVE | Noted: 2022-04-18

## 2022-04-18 LAB
ANION GAP SERPL CALCULATED.3IONS-SCNC: 13 MMOL/L (ref 3–16)
BASOPHILS ABSOLUTE: 0.1 K/UL (ref 0–0.2)
BASOPHILS RELATIVE PERCENT: 0.6 %
BUN BLDV-MCNC: 11 MG/DL (ref 7–20)
CALCIUM SERPL-MCNC: 9.4 MG/DL (ref 8.3–10.6)
CHLORIDE BLD-SCNC: 99 MMOL/L (ref 99–110)
CO2: 24 MMOL/L (ref 21–32)
CREAT SERPL-MCNC: 0.5 MG/DL (ref 0.6–1.1)
EOSINOPHILS ABSOLUTE: 0.1 K/UL (ref 0–0.6)
EOSINOPHILS RELATIVE PERCENT: 1.4 %
GFR AFRICAN AMERICAN: >60
GFR NON-AFRICAN AMERICAN: >60
GLUCOSE BLD-MCNC: 98 MG/DL (ref 70–99)
HCT VFR BLD CALC: 41.7 % (ref 36–48)
HEMOGLOBIN: 14 G/DL (ref 12–16)
LYMPHOCYTES ABSOLUTE: 2.1 K/UL (ref 1–5.1)
LYMPHOCYTES RELATIVE PERCENT: 21.2 %
MCH RBC QN AUTO: 29.3 PG (ref 26–34)
MCHC RBC AUTO-ENTMCNC: 33.6 G/DL (ref 31–36)
MCV RBC AUTO: 87.2 FL (ref 80–100)
MONOCYTES ABSOLUTE: 0.5 K/UL (ref 0–1.3)
MONOCYTES RELATIVE PERCENT: 5 %
NEUTROPHILS ABSOLUTE: 7.2 K/UL (ref 1.7–7.7)
NEUTROPHILS RELATIVE PERCENT: 71.8 %
PDW BLD-RTO: 13.7 % (ref 12.4–15.4)
PLATELET # BLD: 324 K/UL (ref 135–450)
PMV BLD AUTO: 8 FL (ref 5–10.5)
POTASSIUM REFLEX MAGNESIUM: 3.9 MMOL/L (ref 3.5–5.1)
RBC # BLD: 4.78 M/UL (ref 4–5.2)
SODIUM BLD-SCNC: 136 MMOL/L (ref 136–145)
WBC # BLD: 10 K/UL (ref 4–11)

## 2022-04-18 PROCEDURE — 70491 CT SOFT TISSUE NECK W/DYE: CPT

## 2022-04-18 PROCEDURE — 6360000004 HC RX CONTRAST MEDICATION: Performed by: PHYSICIAN ASSISTANT

## 2022-04-18 PROCEDURE — 96366 THER/PROPH/DIAG IV INF ADDON: CPT

## 2022-04-18 PROCEDURE — 96365 THER/PROPH/DIAG IV INF INIT: CPT

## 2022-04-18 PROCEDURE — 87040 BLOOD CULTURE FOR BACTERIA: CPT

## 2022-04-18 PROCEDURE — 36415 COLL VENOUS BLD VENIPUNCTURE: CPT

## 2022-04-18 PROCEDURE — 2580000003 HC RX 258: Performed by: PHYSICIAN ASSISTANT

## 2022-04-18 PROCEDURE — 85025 COMPLETE CBC W/AUTO DIFF WBC: CPT

## 2022-04-18 PROCEDURE — 80048 BASIC METABOLIC PNL TOTAL CA: CPT

## 2022-04-18 PROCEDURE — 99285 EMERGENCY DEPT VISIT HI MDM: CPT

## 2022-04-18 PROCEDURE — 1200000000 HC SEMI PRIVATE

## 2022-04-18 PROCEDURE — 6360000002 HC RX W HCPCS: Performed by: PHYSICIAN ASSISTANT

## 2022-04-18 PROCEDURE — 6360000002 HC RX W HCPCS: Performed by: STUDENT IN AN ORGANIZED HEALTH CARE EDUCATION/TRAINING PROGRAM

## 2022-04-18 PROCEDURE — 96375 TX/PRO/DX INJ NEW DRUG ADDON: CPT

## 2022-04-18 PROCEDURE — 2580000003 HC RX 258: Performed by: STUDENT IN AN ORGANIZED HEALTH CARE EDUCATION/TRAINING PROGRAM

## 2022-04-18 RX ORDER — KETOROLAC TROMETHAMINE 30 MG/ML
15 INJECTION, SOLUTION INTRAMUSCULAR; INTRAVENOUS ONCE
Status: COMPLETED | OUTPATIENT
Start: 2022-04-18 | End: 2022-04-18

## 2022-04-18 RX ORDER — SEMAGLUTIDE 1.34 MG/ML
0.5 INJECTION, SOLUTION SUBCUTANEOUS WEEKLY
COMMUNITY

## 2022-04-18 RX ORDER — SODIUM CHLORIDE 0.9 % (FLUSH) 0.9 %
5-40 SYRINGE (ML) INJECTION PRN
Status: DISCONTINUED | OUTPATIENT
Start: 2022-04-18 | End: 2022-04-20 | Stop reason: HOSPADM

## 2022-04-18 RX ORDER — DEXTROSE MONOHYDRATE 50 MG/ML
100 INJECTION, SOLUTION INTRAVENOUS PRN
Status: DISCONTINUED | OUTPATIENT
Start: 2022-04-18 | End: 2022-04-20 | Stop reason: HOSPADM

## 2022-04-18 RX ORDER — SODIUM CHLORIDE 9 MG/ML
INJECTION, SOLUTION INTRAVENOUS PRN
Status: DISCONTINUED | OUTPATIENT
Start: 2022-04-18 | End: 2022-04-20 | Stop reason: HOSPADM

## 2022-04-18 RX ORDER — ONDANSETRON 2 MG/ML
4 INJECTION INTRAMUSCULAR; INTRAVENOUS EVERY 6 HOURS PRN
Status: DISCONTINUED | OUTPATIENT
Start: 2022-04-18 | End: 2022-04-20 | Stop reason: HOSPADM

## 2022-04-18 RX ORDER — ACETAMINOPHEN 325 MG/1
650 TABLET ORAL EVERY 6 HOURS PRN
Status: DISCONTINUED | OUTPATIENT
Start: 2022-04-18 | End: 2022-04-20 | Stop reason: HOSPADM

## 2022-04-18 RX ORDER — ACETAMINOPHEN 650 MG/1
650 SUPPOSITORY RECTAL EVERY 6 HOURS PRN
Status: DISCONTINUED | OUTPATIENT
Start: 2022-04-18 | End: 2022-04-20 | Stop reason: HOSPADM

## 2022-04-18 RX ORDER — 0.9 % SODIUM CHLORIDE 0.9 %
1000 INTRAVENOUS SOLUTION INTRAVENOUS ONCE
Status: COMPLETED | OUTPATIENT
Start: 2022-04-18 | End: 2022-04-18

## 2022-04-18 RX ORDER — MORPHINE SULFATE 2 MG/ML
2 INJECTION, SOLUTION INTRAMUSCULAR; INTRAVENOUS EVERY 4 HOURS PRN
Status: DISCONTINUED | OUTPATIENT
Start: 2022-04-18 | End: 2022-04-20 | Stop reason: HOSPADM

## 2022-04-18 RX ORDER — DEXTROSE MONOHYDRATE 25 G/50ML
12.5 INJECTION, SOLUTION INTRAVENOUS PRN
Status: DISCONTINUED | OUTPATIENT
Start: 2022-04-18 | End: 2022-04-20 | Stop reason: HOSPADM

## 2022-04-18 RX ORDER — NICOTINE POLACRILEX 4 MG
15 LOZENGE BUCCAL PRN
Status: DISCONTINUED | OUTPATIENT
Start: 2022-04-18 | End: 2022-04-20 | Stop reason: HOSPADM

## 2022-04-18 RX ORDER — METHYLPREDNISOLONE SODIUM SUCCINATE 125 MG/2ML
125 INJECTION, POWDER, LYOPHILIZED, FOR SOLUTION INTRAMUSCULAR; INTRAVENOUS ONCE
Status: COMPLETED | OUTPATIENT
Start: 2022-04-18 | End: 2022-04-18

## 2022-04-18 RX ORDER — SODIUM CHLORIDE 0.9 % (FLUSH) 0.9 %
5-40 SYRINGE (ML) INJECTION EVERY 12 HOURS SCHEDULED
Status: DISCONTINUED | OUTPATIENT
Start: 2022-04-18 | End: 2022-04-20 | Stop reason: HOSPADM

## 2022-04-18 RX ORDER — DEXAMETHASONE SODIUM PHOSPHATE 4 MG/ML
4 INJECTION, SOLUTION INTRA-ARTICULAR; INTRALESIONAL; INTRAMUSCULAR; INTRAVENOUS; SOFT TISSUE EVERY 8 HOURS
Status: DISCONTINUED | OUTPATIENT
Start: 2022-04-18 | End: 2022-04-19

## 2022-04-18 RX ADMIN — IOPAMIDOL 75 ML: 755 INJECTION, SOLUTION INTRAVENOUS at 16:55

## 2022-04-18 RX ADMIN — SODIUM CHLORIDE 1000 ML: 9 INJECTION, SOLUTION INTRAVENOUS at 18:29

## 2022-04-18 RX ADMIN — SODIUM CHLORIDE 3000 MG: 900 INJECTION INTRAVENOUS at 19:00

## 2022-04-18 RX ADMIN — DEXAMETHASONE SODIUM PHOSPHATE 4 MG: 4 INJECTION, SOLUTION INTRAMUSCULAR; INTRAVENOUS at 23:01

## 2022-04-18 RX ADMIN — KETOROLAC TROMETHAMINE 15 MG: 30 INJECTION, SOLUTION INTRAMUSCULAR at 16:26

## 2022-04-18 RX ADMIN — METHYLPREDNISOLONE SODIUM SUCCINATE 125 MG: 125 INJECTION, POWDER, FOR SOLUTION INTRAMUSCULAR; INTRAVENOUS at 18:22

## 2022-04-18 RX ADMIN — SODIUM CHLORIDE, PRESERVATIVE FREE 10 ML: 5 INJECTION INTRAVENOUS at 22:15

## 2022-04-18 RX ADMIN — ENOXAPARIN SODIUM 30 MG: 100 INJECTION SUBCUTANEOUS at 23:01

## 2022-04-18 ASSESSMENT — PAIN DESCRIPTION - FREQUENCY
FREQUENCY: INTERMITTENT
FREQUENCY: INTERMITTENT

## 2022-04-18 ASSESSMENT — ENCOUNTER SYMPTOMS
ABDOMINAL PAIN: 0
SORE THROAT: 0
NAUSEA: 0
VOMITING: 0
BACK PAIN: 0
TROUBLE SWALLOWING: 1
SHORTNESS OF BREATH: 0

## 2022-04-18 ASSESSMENT — PAIN SCALES - GENERAL
PAINLEVEL_OUTOF10: 8
PAINLEVEL_OUTOF10: 8
PAINLEVEL_OUTOF10: 4

## 2022-04-18 ASSESSMENT — PAIN DESCRIPTION - ONSET: ONSET: ON-GOING

## 2022-04-18 ASSESSMENT — PAIN DESCRIPTION - PAIN TYPE
TYPE: ACUTE PAIN
TYPE: ACUTE PAIN

## 2022-04-18 ASSESSMENT — PAIN DESCRIPTION - LOCATION
LOCATION: HEAD
LOCATION: NECK

## 2022-04-18 ASSESSMENT — PAIN DESCRIPTION - DESCRIPTORS
DESCRIPTORS: THROBBING
DESCRIPTORS: THROBBING

## 2022-04-18 ASSESSMENT — PAIN DESCRIPTION - PROGRESSION: CLINICAL_PROGRESSION: GRADUALLY IMPROVING

## 2022-04-18 ASSESSMENT — PAIN - FUNCTIONAL ASSESSMENT
PAIN_FUNCTIONAL_ASSESSMENT: 0-10
PAIN_FUNCTIONAL_ASSESSMENT: ACTIVITIES ARE NOT PREVENTED

## 2022-04-18 ASSESSMENT — PAIN DESCRIPTION - ORIENTATION
ORIENTATION: RIGHT
ORIENTATION: RIGHT

## 2022-04-18 NOTE — LETTER
[address]  Phone: [phone]             April 20, 2022    Patient: Jasmine Colunga   YOB: 1970   Date of Visit: 4/18/2022       To Whom It May Concern:    Rand Child was seen and treated in our facility  beginning 4/18/2022 until 04/20/2022. She may return to work on 04/21/2022.       Sincerely,       Delilah Lubin RN         Signature:__________________________________

## 2022-04-18 NOTE — ED PROVIDER NOTES
[facility]      Pt Name: Krystal Rg  MRN: [id number]  [place] 1970  Date of evaluation: 4/18/2022  Provider: Olivia Muse PA-C    This patient was not seen and evaluated by the attending physician No att. providers found. CHIEF COMPLAINT      Chief Complaint: Neck pain      CRITICAL CARE TIME   Total Critical Care time was 15 minutes, excluding separately reportable procedures. There was a high probability of clinically significant/life threatening deterioration in the patient's condition which required my urgent intervention. HISTORYOF PRESENT ILLNESS  (Location/Symptom, Timing/Onset, Context/Setting, Quality, Duration, Modifying Factors, Severity.)   Krystal Rg is a 49 y.o. female who presents to the emergency department complaining of neck pain which started when she first woke up on Thursday. She says initially she thought she had just slept funny. However the pain has progressively worsened. It is constant and worse with any movement. She rates it 8 out of 10. She says that she was evaluated here yesterday and prescribed medications which do take the edge off but she really has just been bed ridden all day. Now when she goes to swallow she is having pain. She says that she is concerned and returning for reevaluation because she cannot get into her doctor until Friday. No difficulty breathing. Tolerating PO but feels uncomfortable to swallow. Nursing Notes were reviewed and I agree. REVIEW OF SYSTEMS    (2-9 systems for level 4, 10 or more forlevel 5)     Review of Systems   Constitutional: Negative for chills and fever. HENT: Positive for trouble swallowing. Negative for sore throat. Respiratory: Negative for shortness of breath. Cardiovascular: Negative for chest pain. Gastrointestinal: Negative for abdominal pain, nausea and vomiting. Genitourinary: Negative for difficulty urinating and dysuria.    Musculoskeletal: Positive for neck pain. Negative for back pain. Skin: Negative for rash. Neurological: Negative for light-headedness and headaches. Psychiatric/Behavioral: The patient is not nervous/anxious. All other systems reviewed and are negative. Positives and Pertinent negatives as per HPI. Except as noted above the remainder of the review of systems was reviewed and negative. PAST MEDICALHISTORY         Diagnosis Date    Arthritis     Cancer ([facility] [address]     basal-skin    Depression     Hyperlipidemia        SURGICAL HISTORY           Procedure Laterality Date     SECTION      x1    CHOLECYSTECTOMY      COLONOSCOPY  2020    Dr. Callum Arango N/A 2020    COLONOSCOPY performed by Kayden Fernandez MD at [facility]      bilateral       CURRENT MEDICATIONS       Discharge Medication List as of 2022 11:03 AM      CONTINUE these medications which have NOT CHANGED    Details   Semaglutide,0.25 or 0.5MG/DOS, (OZEMPIC, 0.25 OR 0.5 MG/DOSE,) 2 MG/1.5ML SOPN Inject 0.5 mg into the skin once a week Sample from [facility] Med      cyclobenzaprine (FLEXERIL) 10 MG tablet Take 1 tablet by mouth 3 times daily as needed for Muscle spasms, Disp-20 tablet, R-0Normal      HYDROcodone-acetaminophen (NORCO) 5-325 MG per tablet Take 1 tablet by mouth every 6 hours as needed for Pain for up to 3 days. , Disp-10 tablet, R-0Normal      atorvastatin (LIPITOR) 40 MG tablet Take 1 tablet by mouth nightly, Disp-90 tablet, R-3Normal      melatonin 3 MG TABS tablet Take 3 mg by mouth nightly as needed (sleep) Historical Med      sertraline (ZOLOFT) 50 MG tablet TAKE 1 TABLET BY MOUTH IN  THE EVENING, Disp-90 tablet, R-3Requesting 1 year supplyNormal      Cyanocobalamin (B-12 PO) Take 1,000 Units by mouth daily Historical Med      MAGNESIUM PO Take 400 mg by mouth daily Historical Med      Ascorbic Acid (VITAMIN C PO) Take 500 mg by mouth daily the time of this note:    CT SOFT TISSUE NECK W CONTRAST   Final Result   1. Ill-defined fluid in the retropharyngeal space is nonspecific, but could   be infectious. Clinical correlation is recommended. 2. Shotty bilateral cervical lymph nodes are not enlarged by CT size   criteria, but could be reactive.              LABS:  Labs Reviewed   BASIC METABOLIC PANEL W/ REFLEX TO MG FOR LOW K - Abnormal; Notable for the following components:       Result Value    CREATININE 0.5 (*)     All other components within normal limits   CBC WITH AUTO DIFFERENTIAL - Abnormal; Notable for the following components:    Neutrophils Absolute 8.7 (*)     Lymphocytes Absolute 0.7 (*)     All other components within normal limits   BASIC METABOLIC PANEL - Abnormal; Notable for the following components:    Glucose 128 (*)     All other components within normal limits   CBC WITH AUTO DIFFERENTIAL - Abnormal; Notable for the following components:    WBC 11.6 (*)     Neutrophils Absolute 8.8 (*)     All other components within normal limits   BASIC METABOLIC PANEL - Abnormal; Notable for the following components:    Glucose 109 (*)     BUN 21 (*)     All other components within normal limits   POCT GLUCOSE - Abnormal; Notable for the following components:    POC Glucose 138 (*)     All other components within normal limits   POCT GLUCOSE - Abnormal; Notable for the following components:    POC Glucose 136 (*)     All other components within normal limits   POCT GLUCOSE - Abnormal; Notable for the following components:    POC Glucose 119 (*)     All other components within normal limits   POCT GLUCOSE - Abnormal; Notable for the following components:    POC Glucose 116 (*)     All other components within normal limits   POCT GLUCOSE - Abnormal; Notable for the following components:    POC Glucose 146 (*)     All other components within normal limits   POCT GLUCOSE - Abnormal; Notable for the following components:    POC Glucose 147 (*) All other components within normal limits   POCT GLUCOSE - Abnormal; Notable for the following components:    POC Glucose 116 (*)     All other components within normal limits   CULTURE, BLOOD 2    Narrative:     ORDER#: [id number]                          ORDERED BY: DIANNE BUSH  SOURCE: Blood                              COLLECTED:  04/18/22 22:14  ANTIBIOTICS AT [place].:                      RECEIVED :  04/18/22 22:20  If child <=2 yrs old please draw pediatric bottle. ~Blood Culture #2   CULTURE, BLOOD 1    Narrative:     ORDER#: [id number]                          ORDERED BY: DIANNE BUSH  SOURCE: Blood                              COLLECTED:  04/18/22 18:55  ANTIBIOTICS AT [place].:                      RECEIVED :  04/18/22 19:00  If child <=2 yrs old please draw pediatric bottle. ~Blood Culture 1   CBC WITH AUTO DIFFERENTIAL   MAGNESIUM   MAGNESIUM   POCT GLUCOSE   POCT GLUCOSE   POCT GLUCOSE   POCT GLUCOSE       When ordered, only abnormal lab results are displayed. All other labs are within normal range or not returned as of the dictation. EMERGENCY DEPARTMENT COURSE and DIFFERENTIAL DIAGNOSIS/MDM:   Vitals:    Vitals:    04/19/22 0910 04/19/22 1409 04/19/22 2001 04/20/22 0323   BP:  115/71 113/71 125/79   Pulse:  78 89 60   Resp:  18 16 16   Temp:  98 °F (36.7 °C) 98.5 °F (36.9 °C) 97.7 °F (36.5 °C)   TempSrc:  Oral Oral Oral   SpO2: 90% 91% 93% 93%   Weight:    298 lb 8.1 oz (135.4 kg)   Height:           I saw this patient with Dr. Coretta Awad who spent face-to-face time with the patient and agreed with my assessment and plan. The patient was stable, nontoxic and afebrile. She has a normal white blood cell count. H&H stable. Electrolytes normal.  Renal function normal.  Because her symptoms have persisted and worsened and now are involving swallowing, a CT soft tissue neck was performed.   This showed ill-defined fluid in the retropharyngeal space that is nonspecific but could be infectious with shotty bilateral cervical lymph nodes. I discussed this with Dr. Felisha Monte with ENT who agrees with plan for admission to the hospital for IV antibiotics. He has also recommended steroids and that the patient remain NPO. Unasyn and Solu-Medrol have been ordered. The patient was updated on results and plan for admission. The hospitalist was reached via perfect serve for admission    PROCEDURES:  None    FINAL IMPRESSION      1.  Retropharyngeal abscess          DISPOSITION/PLAN   DISPOSITION    Decision to admit    PATIENT REFERRED TO:  Marcia Mart MD  [address] [place] [address]  [phone]    In 2 weeks        MEDICATIONS:  Discharge Medication List as of 4/20/2022 11:03 AM      START taking these medications    Details   amoxicillin-clavulanate (AUGMENTIN) 875-125 MG per tablet Take 1 tablet by mouth 2 times daily for 7 days, Disp-14 tablet, R-0Normal      pantoprazole (PROTONIX) 40 MG tablet Take 1 tablet by mouth every morning (before breakfast), Disp-30 tablet, R-0Normal             (Please note that portions of this note were completed with a voice recognition program.  Efforts were made toedit the dictations but occasionally words are mis-transcribed.)    SIRI Ritchie PA-C  04/18/22 [address]. Marina Damico PA-C  04/23/22 [id number]

## 2022-04-18 NOTE — PROGRESS NOTES
Pharmacy Medication Reconciliation Note     List of medications patient is currently taking is complete. Source of information:   1. Patient  2. EMR    Notes regarding home medications:   1. Patient had her morning and afternoon home medication doses today before presenting to the ER.       [id number] [facility] Satish, Pharmacy Intern  4/18/2022 7:39 PM

## 2022-04-19 LAB
ANION GAP SERPL CALCULATED.3IONS-SCNC: 15 MMOL/L (ref 3–16)
BASOPHILS ABSOLUTE: 0 K/UL (ref 0–0.2)
BASOPHILS RELATIVE PERCENT: 0.2 %
BUN BLDV-MCNC: 14 MG/DL (ref 7–20)
CALCIUM SERPL-MCNC: 9.4 MG/DL (ref 8.3–10.6)
CHLORIDE BLD-SCNC: 102 MMOL/L (ref 99–110)
CO2: 21 MMOL/L (ref 21–32)
CREAT SERPL-MCNC: 0.6 MG/DL (ref 0.6–1.1)
EOSINOPHILS ABSOLUTE: 0 K/UL (ref 0–0.6)
EOSINOPHILS RELATIVE PERCENT: 0 %
GFR AFRICAN AMERICAN: >60
GFR NON-AFRICAN AMERICAN: >60
GLUCOSE BLD-MCNC: 116 MG/DL (ref 70–99)
GLUCOSE BLD-MCNC: 119 MG/DL (ref 70–99)
GLUCOSE BLD-MCNC: 128 MG/DL (ref 70–99)
GLUCOSE BLD-MCNC: 136 MG/DL (ref 70–99)
GLUCOSE BLD-MCNC: 138 MG/DL (ref 70–99)
GLUCOSE BLD-MCNC: 146 MG/DL (ref 70–99)
GLUCOSE BLD-MCNC: 147 MG/DL (ref 70–99)
HCT VFR BLD CALC: 40.6 % (ref 36–48)
HEMOGLOBIN: 13.7 G/DL (ref 12–16)
LYMPHOCYTES ABSOLUTE: 0.7 K/UL (ref 1–5.1)
LYMPHOCYTES RELATIVE PERCENT: 7.5 %
MAGNESIUM: 2.1 MG/DL (ref 1.8–2.4)
MCH RBC QN AUTO: 29.3 PG (ref 26–34)
MCHC RBC AUTO-ENTMCNC: 33.8 G/DL (ref 31–36)
MCV RBC AUTO: 86.8 FL (ref 80–100)
MONOCYTES ABSOLUTE: 0 K/UL (ref 0–1.3)
MONOCYTES RELATIVE PERCENT: 0.5 %
NEUTROPHILS ABSOLUTE: 8.7 K/UL (ref 1.7–7.7)
NEUTROPHILS RELATIVE PERCENT: 91.8 %
PDW BLD-RTO: 13.5 % (ref 12.4–15.4)
PERFORMED ON: ABNORMAL
PLATELET # BLD: 293 K/UL (ref 135–450)
PMV BLD AUTO: 8.2 FL (ref 5–10.5)
POTASSIUM SERPL-SCNC: 3.7 MMOL/L (ref 3.5–5.1)
RBC # BLD: 4.67 M/UL (ref 4–5.2)
SODIUM BLD-SCNC: 138 MMOL/L (ref 136–145)
WBC # BLD: 9.4 K/UL (ref 4–11)

## 2022-04-19 PROCEDURE — 94760 N-INVAS EAR/PLS OXIMETRY 1: CPT

## 2022-04-19 PROCEDURE — 80048 BASIC METABOLIC PNL TOTAL CA: CPT

## 2022-04-19 PROCEDURE — 2580000003 HC RX 258: Performed by: STUDENT IN AN ORGANIZED HEALTH CARE EDUCATION/TRAINING PROGRAM

## 2022-04-19 PROCEDURE — 1200000000 HC SEMI PRIVATE

## 2022-04-19 PROCEDURE — 99223 1ST HOSP IP/OBS HIGH 75: CPT | Performed by: STUDENT IN AN ORGANIZED HEALTH CARE EDUCATION/TRAINING PROGRAM

## 2022-04-19 PROCEDURE — 31575 DIAGNOSTIC LARYNGOSCOPY: CPT | Performed by: STUDENT IN AN ORGANIZED HEALTH CARE EDUCATION/TRAINING PROGRAM

## 2022-04-19 PROCEDURE — 83735 ASSAY OF MAGNESIUM: CPT

## 2022-04-19 PROCEDURE — 6360000002 HC RX W HCPCS: Performed by: STUDENT IN AN ORGANIZED HEALTH CARE EDUCATION/TRAINING PROGRAM

## 2022-04-19 PROCEDURE — 6370000000 HC RX 637 (ALT 250 FOR IP): Performed by: STUDENT IN AN ORGANIZED HEALTH CARE EDUCATION/TRAINING PROGRAM

## 2022-04-19 PROCEDURE — 36415 COLL VENOUS BLD VENIPUNCTURE: CPT

## 2022-04-19 PROCEDURE — 6370000000 HC RX 637 (ALT 250 FOR IP): Performed by: NURSE PRACTITIONER

## 2022-04-19 PROCEDURE — 85025 COMPLETE CBC W/AUTO DIFF WBC: CPT

## 2022-04-19 RX ORDER — ATORVASTATIN CALCIUM 40 MG/1
40 TABLET, FILM COATED ORAL NIGHTLY
Status: DISCONTINUED | OUTPATIENT
Start: 2022-04-19 | End: 2022-04-20 | Stop reason: HOSPADM

## 2022-04-19 RX ORDER — LANOLIN ALCOHOL/MO/W.PET/CERES
3 CREAM (GRAM) TOPICAL NIGHTLY PRN
Status: DISCONTINUED | OUTPATIENT
Start: 2022-04-19 | End: 2022-04-20 | Stop reason: HOSPADM

## 2022-04-19 RX ADMIN — SODIUM CHLORIDE 3000 MG: 900 INJECTION INTRAVENOUS at 00:07

## 2022-04-19 RX ADMIN — DEXAMETHASONE SODIUM PHOSPHATE 4 MG: 4 INJECTION, SOLUTION INTRAMUSCULAR; INTRAVENOUS at 06:30

## 2022-04-19 RX ADMIN — ACETAMINOPHEN 650 MG: 325 TABLET ORAL at 18:53

## 2022-04-19 RX ADMIN — ENOXAPARIN SODIUM 30 MG: 100 INJECTION SUBCUTANEOUS at 20:49

## 2022-04-19 RX ADMIN — ACETAMINOPHEN 650 MG: 325 TABLET ORAL at 00:30

## 2022-04-19 RX ADMIN — Medication 3 MG: at 21:27

## 2022-04-19 RX ADMIN — INSULIN LISPRO 1 UNITS: 100 INJECTION, SOLUTION INTRAVENOUS; SUBCUTANEOUS at 20:49

## 2022-04-19 RX ADMIN — ACETAMINOPHEN 650 MG: 325 TABLET ORAL at 12:15

## 2022-04-19 RX ADMIN — SODIUM CHLORIDE 3000 MG: 900 INJECTION INTRAVENOUS at 18:50

## 2022-04-19 RX ADMIN — SERTRALINE 50 MG: 50 TABLET, FILM COATED ORAL at 21:27

## 2022-04-19 RX ADMIN — SODIUM CHLORIDE, PRESERVATIVE FREE 10 ML: 5 INJECTION INTRAVENOUS at 20:49

## 2022-04-19 RX ADMIN — SODIUM CHLORIDE 3000 MG: 900 INJECTION INTRAVENOUS at 12:12

## 2022-04-19 RX ADMIN — SODIUM CHLORIDE 3000 MG: 900 INJECTION INTRAVENOUS at 23:54

## 2022-04-19 RX ADMIN — ATORVASTATIN CALCIUM 40 MG: 40 TABLET, FILM COATED ORAL at 21:27

## 2022-04-19 RX ADMIN — SODIUM CHLORIDE 3000 MG: 900 INJECTION INTRAVENOUS at 06:14

## 2022-04-19 ASSESSMENT — PAIN DESCRIPTION - PROGRESSION
CLINICAL_PROGRESSION: GRADUALLY IMPROVING
CLINICAL_PROGRESSION: NOT CHANGED
CLINICAL_PROGRESSION: NOT CHANGED

## 2022-04-19 ASSESSMENT — PAIN SCALES - GENERAL
PAINLEVEL_OUTOF10: 5
PAINLEVEL_OUTOF10: 0
PAINLEVEL_OUTOF10: 1
PAINLEVEL_OUTOF10: 0
PAINLEVEL_OUTOF10: 2

## 2022-04-19 ASSESSMENT — PAIN DESCRIPTION - PAIN TYPE
TYPE: ACUTE PAIN

## 2022-04-19 ASSESSMENT — PAIN - FUNCTIONAL ASSESSMENT
PAIN_FUNCTIONAL_ASSESSMENT: ACTIVITIES ARE NOT PREVENTED

## 2022-04-19 ASSESSMENT — PAIN DESCRIPTION - DESCRIPTORS
DESCRIPTORS: HEADACHE
DESCRIPTORS: THROBBING
DESCRIPTORS: HEADACHE

## 2022-04-19 ASSESSMENT — PAIN DESCRIPTION - LOCATION
LOCATION: HEAD
LOCATION: NECK
LOCATION: HEAD

## 2022-04-19 ASSESSMENT — PAIN DESCRIPTION - FREQUENCY
FREQUENCY: INTERMITTENT

## 2022-04-19 ASSESSMENT — PAIN DESCRIPTION - ORIENTATION
ORIENTATION: MID
ORIENTATION: MID
ORIENTATION: RIGHT

## 2022-04-19 ASSESSMENT — PAIN DESCRIPTION - ONSET
ONSET: ON-GOING

## 2022-04-19 NOTE — H&P
Hospital Medicine History & Physical      PCP: Deb Valadez MD    Date of Admission: 4/18/2022    Date of Service: Pt seen/examined on 4/18/2022 and admitted to inpatient    Chief Complaint: Neck pain, hoarseness, sore throat, chills, fatigue,      History Of Present Illness: The patient is a 49 y.o. female with past medical history as below who presents to [facility] with persistent and worsening right cervical neck pain that she notes started occurring over the course of the past 36 hours or so the previous morning, she was seen in the ED prior day after the pain and started over the course of the morning and she was prescribed pain medication thinking it might be more musculoskeletal.  However, what was also noted was that over the course of the last few weeks she was battling some upper respiratory congestion and also was starting to develop some increasing level of sore throat as well as some difficulty with noted chills and fatigue but what became more concerning was the neck pain which she had never had before along with the other symptoms. He already started on some antibiotics and steroids for the upper respiratory congestion symptoms about a week and a half ago and she seemed to have improved from those symptoms but the neck pain and the other symptoms of note as above started getting worse in the last 2 to 3 days. She having contact with anyone sick but does feel that her  may have had similar symptoms other than the neck pain a few weeks ago. She denies other recent symptoms of dizziness, syncope, chest pain, shortness of breath, dysuria, blood in urine/stool/sputum, leg swelling, rashes, vision change, focal weakness, vomiting or diarrhea. She has had some nausea today however.     Past Medical History:        Diagnosis Date    Arthritis  Cancer ([facility] [address]     basal-skin    Depression     Hyperlipidemia        Past Surgical History:        Procedure Laterality Date     SECTION      x1    CHOLECYSTECTOMY      COLONOSCOPY  2020    Dr. Hillary Rodriguez N/A 2020    COLONOSCOPY performed by Denzel Hauser MD at [facility]      bilateral       Medications Prior to Admission:    Prior to Admission medications    Medication Sig Start Date End Date Taking? Authorizing Provider   Semaglutide,0.25 or 0.5MG/DOS, (OZEMPIC, 0.25 OR 0.5 MG/DOSE,) 2 MG/1.5ML SOPN Inject 0.5 mg into the skin once a week Sample from PCP   Yes Historical Provider, MD   cyclobenzaprine (FLEXERIL) 10 MG tablet Take 1 tablet by mouth 3 times daily as needed for Muscle spasms 22   UVALDO Bhardwaj   HYDROcodone-acetaminophen (NORCO) 5-325 MG per tablet Take 1 tablet by mouth every 6 hours as needed for Pain for up to 3 days. 22  UVALDO Bhardwaj   atorvastatin (LIPITOR) 40 MG tablet Take 1 tablet by mouth nightly 3/7/22 6/5/22  Marita Cary MD   melatonin 3 MG TABS tablet Take 3 mg by mouth nightly as needed (sleep)     Historical Provider, MD   sertraline (ZOLOFT) 50 MG tablet TAKE 1 TABLET BY MOUTH IN  THE EVENING 22   Marita Cary MD   Cyanocobalamin (B-12 PO) Take 1,000 Units by mouth daily     Historical Provider, MD   MAGNESIUM PO Take 400 mg by mouth daily     Historical Provider, MD   Ascorbic Acid (VITAMIN C PO) Take 500 mg by mouth daily     Historical Provider, MD   VITAMIN D PO Take 1,000 Units by mouth daily     Historical Provider, MD   aspirin 81 MG chewable tablet Take 81 mg by mouth daily    Historical Provider, MD       Allergies:  Patient has no known allergies. Social History:  The patient currently lives home    TOBACCO:   reports that she has never smoked.  She has never used smokeless tobacco.  ETOH:   reports current alcohol use.      Family History:  Reviewed in detail and negative for DM, Early CAD, Cancer, CVA. Positive as follows:        Problem Relation Age of Onset    Breast Cancer Mother 76       REVIEW OF SYSTEMS:   as noted in the HPI. All other systems reviewed and negative. PHYSICAL EXAM:    /87   Pulse 90   Temp 98.1 °F (36.7 °C) (Oral)   Resp 16   Ht 5' 10\" (1.778 m)   Wt 299 lb 9.7 oz (135.9 kg)   SpO2 93%   BMI 42.99 kg/m²     General appearance: Alert and oriented x4, some slight hoarseness to the voice which she notes is abnormal, pleasant and conversational, speaking in complete sentences  HEENT Normal cephalic, atraumatic without obvious deformity. Pupils equal, round, and reactive to light. Extra ocular muscles intact. Mildly dry mucous membranes, unable to fully visualize the back of the oropharynx, does not seem to demonstrate any excessive oropharyngeal edema but was able to visualize the uvula to some degree. Did not see an active area of drainage or an abscess of note but again difficult to fully visualize. No facial swelling noted  Neck: Supple, no JVD, no neck swelling  Lungs: Clear to auscultation, bilaterally without Rales/Wheezes/Rhonchi with good respiratory effort. Heart: Regular rate and rhythm with Normal S1/S2 without murmurs, rubs or gallops, point of maximum impulse non-displaced  Abdomen: Soft, non-tender or non-distended without rigidity or guarding and positive bowel sounds all four quadrants. Extremities: No edema  Skin: No rashes  Neurologic: Alert and oriented X 3, neurovascularly intact with sensory/motor intact upper extremities/lower extremities, bilaterally. Cranial nerves: II-XII intact, grossly non-focal.  Mental status: Alert, oriented, thought content appropriate. Capillary Refill: Acceptable  < 3 seconds  Peripheral Pulses: +3 Easily felt, not easily obliterated with pressure    CT soft tissue neck with IV contrast:  1.  Ill-defined fluid in the retropharyngeal space is nonspecific, but could   be infectious.  Clinical correlation is recommended. 2. Shotty bilateral cervical lymph nodes are not enlarged by CT size   criteria, but could be reactive. CBC   Recent Labs     04/18/22  1623   WBC 10.0   HGB 14.0   HCT 41.7         RENAL  Recent Labs     04/18/22  1623      K 3.9   CL 99   CO2 24   BUN 11   CREATININE 0.5*     LFT'S  No results for input(s): AST, ALT, ALB, BILIDIR, BILITOT, ALKPHOS in the last 72 hours. COAG  No results for input(s): INR in the last 72 hours. CARDIAC ENZYMES  No results for input(s): CKTOTAL, CKMB, CKMBINDEX, TROPONINI in the last 72 hours.     U/A:    Lab Results   Component Value Date    NITRITE neg 11/29/2016    COLORU YELLOW 02/23/2022    CLARITYU Clear 02/23/2022    SPECGRAV >1.030 02/23/2022    LEUKOCYTESUR Negative 02/23/2022    BLOODU Negative 02/23/2022    GLUCOSEU Negative 02/23/2022       ABG  No results found for: WVE2LAT, BEART, E0KLDARN, PHART, THGBART, EDP4FAS, PO2ART, [place]        Active Hospital Problems    Diagnosis Date Noted    Pharyngeal abscess [J39.1] 94/48/9161    Metabolic syndrome [O04.16] 10/07/2021    Gastroesophageal reflux disease with esophagitis [K21.00] 10/06/2017    Hyperlipidemia [E78.5] 03/30/2012         PHYSICIANS CERTIFICATION:    I certify that Anthony Rosales is expected to be hospitalized for greater than 2 midnights based on the following assessment and plan:      ASSESSMENT/PLAN:  · Pharyngeal abscess  · Metabolic syndrome  · GERD  · Hyperlipidemia      Plan:  · ENT was consulted in the ED, they plan to evaluate patient in the morning  · Keep patient n.p.o. at this time  · Start patient on Decadron 4 mg every 8 for oropharyngeal inflammation  · Start patient on Unasyn for pharyngitis and possible abscess  · Keep patient on sliding scale insulin every 4 hours while NPO and monitor for any worsening hyperglycemia  · Repeat labs daily CBC/BMP/magnesium  · Holding patient's home medications for now    DVT Prophylaxis: Lovenox  Diet: Diet NPO  Code Status: Full Code  PT/OT Eval Status: Ambulatory    Dispo -pending clinical course       Kannan Ramirez DO    Thank you Viry Alvarez MD for the opportunity to be involved in this patient's care. If you have any questions or concerns please feel free to contact me at [phone].

## 2022-04-19 NOTE — PROGRESS NOTES
4 Eyes Admission Assessment      I agree as the admission nurse that 2 RN's have performed a thorough Head to Toe Skin Assessment on the patient. ALL assessment sites listed below have been assessed on admission. Areas assessed by both nurses:   [x]   Head, Face, and Ears   [x]   Shoulders, Back, and Chest  [x]   Arms, Elbows, and Hands   [x]   Coccyx, Sacrum, and Ischum  [x]   Legs, Feet, and Heels                        Does the Patient have Skin Breakdown?   No         Chandana Prevention initiated:  Yes   Wound Care Orders initiated:  NA      [facility] nurse consulted for Pressure Injury (Stage 3,4, Unstageable, DTI, NWPT, and Complex wounds):  NA       Nurse 1 eSignature: Electronically signed by Miguel Ángel Godoy on 4/19/2022 at 2:34 AM    **SHARE this note so that the co-signing nurse is able to place an eSignature**     Nurse 2 eSignature: {Esignature:[id number]}

## 2022-04-19 NOTE — PROGRESS NOTES
Hospitalist Progress Note      PCP: Sullivan Goltz, MD    Date of Admission: 4/18/2022    Chief Complaint:   Chief Complaint   Patient presents with    Neck Pain     right side neck pain, radiates up right side of head. pt states here yesterday, d/c with pain and muscle relaxer. no relief. can't get into pcp for f/u until friday. pt states now hurts to swallow      Hospital Course: The patient is a 49 y.o. female with past medical history as below who presents to [facility] with persistent and worsening right cervical neck pain that she notes started occurring over the course of the past 36 hours or so the previous morning, she was seen in the ED prior day after the pain and started over the course of the morning and she was prescribed pain medication thinking it might be more musculoskeletal.  However, what was also noted was that over the course of the last few weeks she was battling some upper respiratory congestion and also was starting to develop some increasing level of sore throat as well as some difficulty with noted chills and fatigue but what became more concerning was the neck pain which she had never had before along with the other symptoms. He already started on some antibiotics and steroids for the upper respiratory congestion symptoms about a week and a half ago and she seemed to have improved from those symptoms but the neck pain and the other symptoms of note as above started getting worse in the last 2 to 3 days. She having contact with anyone sick but does feel that her  may have had similar symptoms other than the neck pain a few weeks ago. She denies other recent symptoms of dizziness, syncope, chest pain, shortness of breath, dysuria, blood in urine/stool/sputum, leg swelling, rashes, vision change, focal weakness, vomiting or diarrhea. She has had some nausea today however.     Subjective:     Patient tells me she feels significantly better today and is able to turn her head without so much pain anymore. Still with some swallowing pain, but this has improved as well. Awaiting ENT recs during my visit. Medications:  Reviewed    Infusion Medications    sodium chloride      dextrose       Scheduled Medications    insulin lispro  0-6 Units SubCUTAneous TID WC    insulin lispro  0-3 Units SubCUTAneous Nightly    ampicillin-sulbactam  3,000 mg IntraVENous Q6H    sodium chloride flush  5-40 mL IntraVENous 2 times per day    enoxaparin  30 mg SubCUTAneous BID     PRN Meds: sodium chloride flush, sodium chloride, acetaminophen **OR** acetaminophen, ondansetron, glucose, dextrose, glucagon (rDNA), dextrose, morphine      Intake/Output Summary (Last 24 hours) at 4/19/2022 1428  Last data filed at 4/19/2022 0954  Gross per 24 hour   Intake 1098.7 ml   Output 870 ml   Net 228.7 ml       Physical Exam Performed:    /71   Pulse 78   Temp 98 °F (36.7 °C) (Oral)   Resp 18   Ht 5' 10\" (1.778 m)   Wt 299 lb 9.7 oz (135.9 kg)   SpO2 91%   BMI 42.99 kg/m²     General appearance: No apparent distress, appears stated age and cooperative. HEENT: Pupils equal, round, and reactive to light. Conjunctivae/corneas clear. Neck: Supple, with full range of motion. No jugular venous distention. Trachea midline. Throat is not edematous or erythematous  Respiratory:  Normal respiratory effort. Clear to auscultation, bilaterally without Rales/Wheezes/Rhonchi. Cardiovascular: Regular rate and rhythm with normal S1/S2 without murmurs, rubs or gallops. Abdomen: Soft, non-tender, non-distended with normal bowel sounds. Musculoskeletal: No clubbing, cyanosis or edema bilaterally. Full range of motion without deformity. Skin: Skin color, texture, turgor normal.  No rashes or lesions. Neurologic:  Neurovascularly intact without any focal sensory/motor deficits.  Cranial nerves: II-XII intact, grossly non-focal.  Psychiatric: Alert and oriented, thought content appropriate, normal insight  Capillary Refill: Brisk,< 3 seconds   Peripheral Pulses: +2 palpable, equal bilaterally       Labs:   Recent Labs     04/18/22  1623 04/19/22  0732   WBC 10.0 9.4   HGB 14.0 13.7   HCT 41.7 40.6    293     Recent Labs     04/18/22  1623 04/19/22  0732    138   K 3.9 3.7   CL 99 102   CO2 24 21   BUN 11 14   CREATININE 0.5* 0.6   CALCIUM 9.4 9.4     No results for input(s): AST, ALT, BILIDIR, BILITOT, ALKPHOS in the last 72 hours. No results for input(s): INR in the last 72 hours. No results for input(s): Andres Ascencion in the last 72 hours. Urinalysis:      Lab Results   Component Value Date    NITRU Negative 02/23/2022    BLOODU Negative 02/23/2022    SPECGRAV >1.030 02/23/2022    GLUCOSEU Negative 02/23/2022       Radiology:  CT SOFT TISSUE NECK W CONTRAST   Final Result   1. Ill-defined fluid in the retropharyngeal space is nonspecific, but could   be infectious. Clinical correlation is recommended. 2. Shotty bilateral cervical lymph nodes are not enlarged by CT size   criteria, but could be reactive. Assessment/Plan:    Active Hospital Problems    Diagnosis     Pharyngeal abscess [Y13.9]     Metabolic syndrome [L40.47]     Gastroesophageal reflux disease with esophagitis [K21.00]     Hyperlipidemia [E78.5]      Pharyngeal abscess   - CT neck soft tissue revealed: Ill-defined fluid in the retropharyngeal space is nonspecific but could be infectious. Shotty bilateral cervical lymph nodes are not enlarged by CT size criteria, but could be reactive  - ENT consulted, thank you for recommendations  - IV Unasyn  - Regular diet per ENT recs    Hx of HLD, controlled with statin. - On home statin - held for now 2/2 above  - Follow-up with PCP for med adjustments    Hx of depression, mood is stable. - On home zoloft, held for now 2/2 above    Morbid Obesity -  With Body mass index is 42.99 kg/m². Complicating assessment and treatment.  Placing patient at risk for multiple co-morbidities as well as early death and contributing to the patient's presentation. Counseled on weight loss      DVT Prophylaxis: lovenox  Diet: ADULT DIET; Regular; 5 carb choices (75 gm/meal)  Code Status: Full Code    PT/OT Eval Status: not indicated    Dispo -pending clinical improvement    Bhumika Covington NP

## 2022-04-19 NOTE — ED PROVIDER NOTES
[facility]      Pt Name: Radha Mary  MRN: [id number]  [place] 1970  Date of evaluation: 4/18/2022  Provider: Tom Pineda, [facility]  Chief Complaint   Patient presents with    Neck Pain     right side neck pain, radiates up right side of head. pt states here yesterday, d/c with pain and muscle relaxer. no relief. can't get into pcp for f/u until friday. pt states now hurts to swallow        I have fully participated in the care of Radha Mary and have had a face-to-face evaluation. I have reviewed and agree with all pertinent clinical information, and midlevel provider's history, and physical exam. I have also reviewed the labs and imaging studies and treatment plan. I have also reviewed and agree with the medications, allergies and past medical history section for this Radha Mary. I agree with the diagnosis, and I concur. I wore personal protective equipment when I was in the room the entire time. This includes gloves, N95 mask, face shield, and a glove over my stethoscope for protection. Past Medical History:   Diagnosis Date    Arthritis     Cancer ([facility] [address]     basal-skin    Depression     Hyperlipidemia        MDM:  Radha Mary is a 49 y.o. female who presents with neck pain that has been present for 2 to 3 days. She states it started on Thursday when she woke up and has gotten progressively worse. She then started having problems swallowing. She denies any problems breathing. She cannot get into her doctor until Friday in came into the emergency department for evaluation and treatment. She has been taking fluids. Patient states it hurts to swallow. Physical exam reveals tenderness of the anterior neck. No swelling is noted. There is no stridor. Tenderness is noted mostly on the right side. CT scan reveals a retropharyngeal fluid collection is probably an abscess. She also has cervical lymph nodes bilaterally.   The remainder of her lab work was negative. Therefore, patient was admitted to the hospital for possible retropharyngeal abscess after we spoke to ENT. Hospitalist was notified by the physician assistant at [phone] and he stated that he would notify the oncoming shift. Vitals:    04/18/22 2225   BP: 136/87   Pulse: 90   Resp: 16   Temp: 98.1 °F (36.7 °C)   SpO2: 93%       Lab results  Labs Reviewed   BASIC METABOLIC PANEL W/ REFLEX TO MG FOR LOW K - Abnormal; Notable for the following components:       Result Value    CREATININE 0.5 (*)     All other components within normal limits   POCT GLUCOSE - Abnormal; Notable for the following components:    POC Glucose 138 (*)     All other components within normal limits   POCT GLUCOSE - Abnormal; Notable for the following components:    POC Glucose 136 (*)     All other components within normal limits   CULTURE, BLOOD 2   CULTURE, BLOOD 1   CBC WITH AUTO DIFFERENTIAL   CBC WITH AUTO DIFFERENTIAL   BASIC METABOLIC PANEL   MAGNESIUM   POCT GLUCOSE   POCT GLUCOSE   POCT GLUCOSE   POCT GLUCOSE   POCT GLUCOSE   POCT GLUCOSE     Radiology results  CT SOFT TISSUE NECK W CONTRAST   Final Result   1. Ill-defined fluid in the retropharyngeal space is nonspecific, but could   be infectious. Clinical correlation is recommended. 2. Shotty bilateral cervical lymph nodes are not enlarged by CT size   criteria, but could be reactive.              Medications   ampicillin-sulbactam (UNASYN) 3000 mg ivpb minibag (0 mg IntraVENous Stopped 4/19/22 0644)   dexamethasone (DECADRON) injection 4 mg (4 mg IntraVENous Given 4/19/22 0630)   sodium chloride flush 0.9 % injection 5-40 mL (10 mLs IntraVENous Given 4/18/22 2215)   sodium chloride flush 0.9 % injection 5-40 mL (has no administration in time range)   0.9 % sodium chloride infusion (has no administration in time range)   acetaminophen (TYLENOL) tablet 650 mg (650 mg Oral Given 4/19/22 0030)     Or   acetaminophen (TYLENOL) suppository 650 mg ( Rectal See Alternative 4/19/22 0030)   ondansetron (ZOFRAN) injection 4 mg (has no administration in time range)   glucose (GLUTOSE) 40 % oral gel 15 g (has no administration in time range)   dextrose 50 % IV solution (has no administration in time range)   glucagon (rDNA) injection 1 mg (has no administration in time range)   dextrose 5 % solution (has no administration in time range)   enoxaparin (LOVENOX) injection 30 mg (30 mg SubCUTAneous Given 4/18/22 2301)   morphine (PF) injection 2 mg (has no administration in time range)   insulin lispro (HUMALOG) injection vial 0-6 Units (0 Units SubCUTAneous Not Given 4/19/22 0700)   ketorolac (TORADOL) injection 15 mg (15 mg IntraVENous Given 4/18/22 1626)   iopamidol (ISOVUE-370) 76 % injection 75 mL (75 mLs IntraVENous Given 4/18/22 1655)   methylPREDNISolone sodium (SOLU-MEDROL) injection 125 mg (125 mg IntraVENous Given 4/18/22 1822)   ampicillin-sulbactam (UNASYN) 3000 mg ivpb minibag (0 mg IntraVENous Stopped 4/18/22 2103)   0.9 % sodium chloride bolus (0 mLs IntraVENous Stopped 4/18/22 2103)       Current Discharge Medication List          The patient's blood pressure was found to be elevated according to CMS/Medicare and the Affordable Care Act/ObamaCare criteria. Elevated blood pressure could occur because of pain or anxiety or other reasons and does not mean that they need to have their blood pressure treated or medications otherwise adjusted. However, this could also be a sign that they will need to have their blood pressure treated or medications changed. The patient was instructed to follow up closely with their personal physician to have their blood pressure rechecked. The patient was instructed to take a list of recent blood pressure readings to their next visit with their personal physician. IMPRESSIONS:  1.  Retropharyngeal abscess              Cindy Peñaloza DO  04/19/22 8680

## 2022-04-19 NOTE — PLAN OF CARE
Problem: Pain:  Goal: Pain level will decrease  Description: Pain level will decrease  4/19/2022 0955 by Pedro Arias RN  Outcome: Ongoing   Pain /discomfort being managed with PRN analgesics per MD orders. Patient able to express presence and absence of pain and rate pain appropriately using numerical scale.      Problem: Falls - Risk of:  Goal: Will remain free from falls  Description: Will remain free from falls  Outcome: Ongoing

## 2022-04-19 NOTE — PROGRESS NOTES
Patient admitted to room 4128 dx pharyngeal abscess, currently is resting in bed. Alert and oriented X 4. Complaining of pain, PRN pain medication given per order (see MAR). IV capped and flushed. Assessment complete. Patient able to independently ambulate. All patient needs are met at this time. Call light is in reach.

## 2022-04-19 NOTE — CARE COORDINATION
SW attempted to meet with patient to review discharge needs, however, she was in the restroom. ESTER will try again later if time permits. Respectfully submitted,    YUE Schuler  [facility]   [phone]    Electronically signed by YUE Andrade on 4/19/2022 at 3:39 PM

## 2022-04-19 NOTE — CARE COORDINATION
INITIAL CASE MANAGEMENT ASSESSMENT     Met with patient to assess possible discharge needs. Explained Case Management role/services. SW interviewed patient alone at bedside today.     Living Situation: Patient reports that she resides in a single family home with her  and one dog. There are two stairs leading up to the front door. Prior to medical admission patient reports that she had no trouble getting in and out of the property.     ADLs: Prior to medical admission patient reports that she was independent. She stated that she doesn't anticipate any needs at discharge.      DME: Prior to medical admission patient reports that she used no durable medical equipment. She stated that she doesn't anticipate any needs at discharge.     PT/OT Recs: Not ordered.      Active Services: Prior to medical admission patient reports that she had no active services in place She stated that she doesn't anticipate any needs at discharge.     Transportation: Prior to medical admission patient reports that she was an active . She stated that family will likely transport her home at discharge.     Medications: Patient receives medications from [facility] in [place]. At this time there are no issues with access or affordability.     PCP: Edgar Coates [phone] (phone) and [phone] (fax number). Patient reports that her last appointment with this provider was over two months ago.  Sol Eastman  HD/PD: N/A     PLAN/COMMENTS:  1) ENT clearance. 2) Monitor for IVAB Needs. 3) Work note requested at discharge.      SW provided contact information for patient or family to call with any questions. SW will follow and assist as needed.     Respectfully submitted,    YUE Schuler  [facility]   [phone]     Electronically signed by YUE Newton on 4/19/2022 at 3:34 PM

## 2022-04-19 NOTE — CONSULTS
[facility]      Patient Name: Laci Hogan. Record Number:  [id number]  Primary Care Physician:  Chichi Montes MD  Date of Consultation: 2022    Chief Complaint: Dysphagia, retropharyngeal fluid collection. HISTORY OF PRESENT Thompson Nguyen is a(n) 49 y.o. female who presents for evaluation of dysphagia. Patient states she was sick about a week ago. This slowly improved however yesterday she woke up with a sore neck which progressed to trouble swallowing. She was ultimately seen in the hospital where a CT scan was performed showing some scattered adenopathy and retropharyngeal fluid collection. Patient was started on IV antibiotics and steroids. This significantly improved her symptoms. She is feeling 100% better overall. She is still getting a small amount of catching in her throat. The patient does admit to some uncontrolled reflux and takes Pepcid for this. He denies changes in her voice. She is breathing okay. Nothing is made her symptoms worse.       Patient Active Problem List   Diagnosis    Hyperlipidemia    Depression    Gilbert's disease    Gastroesophageal reflux disease with esophagitis    History of TIA (transient ischemic attack)    Morbid obesity with BMI of 40.0-44.9, adult ([address]    Metabolic syndrome    Numbness    Left cervical radiculopathy    Pharyngeal abscess     Past Surgical History:   Procedure Laterality Date     SECTION      x1    CHOLECYSTECTOMY      COLONOSCOPY  2020    Dr. Shanta Yap    COLONOSCOPY N/A 2020    COLONOS[facility] by Mary Munson [facility]      bilateral     Family History   Problem Relation Age of Onset    Breast Cancer Mother 76     Social History     Socioeconomic History    Marital status:      Spouse name: Not on file    Number of children: Not on file    Years of education: Not on file    Highest education level: Not on file   Occupational History    Not on file   Tobacco Use    Smoking status: Never Smoker    Smokeless tobacco: Never Used   Vaping Use    Vaping Use: Never used   Substance and Sexual Activity    Alcohol use: Yes     Comment: socially    Drug use: Never    Sexual activity: Yes     Partners: Male   Other Topics Concern    Not on file   Social History Narrative    Not on file     Social Determinants of Health     Financial Resource Strain:     Difficulty of Paying Living Expenses: Not on file   Food Insecurity: No Food Insecurity    Worried About Running Out of Food in the Last Year: Never true    Ana of Food in the Last Year: Never true   Transportation Needs: No Transportation Needs    Lack of Transportation (Medical): No    Lack of Transportation (Non-Medical): No   Physical Activity:     Days of Exercise per Week: Not on file    Minutes of Exercise per Session: Not on file   Stress:     Feeling of Stress : Not on file   Social Connections:     Frequency of Communication with Friends and Family: Not on file    Frequency of Social Gatherings with Friends and Family: Not on file    Attends Shinto Services: Not on file    Active Member of [address] [place] [address] or Organizations: Not on file    Attends Club or Organization Meetings: Not on file    Marital Status: Not on file   Intimate Partner Violence:     Fear of Current or Ex-Partner: Not on file    Emotionally Abused: Not on file    Physically Abused: Not on file    Sexually Abused: Not on file   Housing Stability:     Unable to Pay for Housing in the Last Year: Not on file    Number of [place] in the Last Year: Not on file    Unstable Housing in the Last Year: Not on file       DRUG/FOOD ALLERGIES: Patient has no known allergies. CURRENT MEDICATIONS  Prior to Admission medications    Medication Sig Start Date End Date Taking?  Authorizing Provider   Semaglutide,0.25 or 0.5MG/DOS, (OZEMPIC, 0.25 OR 0.5 MG/DOSE,) 2 MG/1.5ML SOPN Inject 0.5 mg into the skin once a week Sample from PCP   Yes Historical Provider, MD   cyclobenzaprine (FLEXERIL) 10 MG tablet Take 1 tablet by mouth 3 times daily as needed for Muscle spasms 4/17/22   UVALDO Paez   HYDROcodone-acetaminophen (NORCO) 5-325 MG per tablet Take 1 tablet by mouth every 6 hours as needed for Pain for up to 3 days.  4/17/22 4/20/22  UVALDO Paez   atorvastatin (LIPITOR) 40 MG tablet Take 1 tablet by mouth nightly 3/7/22 6/5/22  Reno Quintero MD   melatonin 3 MG TABS tablet Take 3 mg by mouth nightly as needed (sleep)     Historical Provider, MD   sertraline (ZOLOFT) 50 MG tablet TAKE 1 TABLET BY MOUTH IN  THE EVENING 2/14/22   Reno Quintero MD   Cyanocobalamin (B-12 PO) Take 1,000 Units by mouth daily     Historical Provider, MD   MAGNESIUM PO Take 400 mg by mouth daily     Historical Provider, MD   Ascorbic Acid (VITAMIN C PO) Take 500 mg by mouth daily     Historical Provider, MD   VITAMIN D PO Take 1,000 Units by mouth daily     Historical Provider, MD   aspirin 81 MG chewable tablet Take 81 mg by mouth daily    Historical Provider, MD       REVIEW OF SYSTEMS  The following systems were reviewed and revealed the following in addition to any already discussed in the HPI:    CONSTITUTIONAL: No weight loss, no fever, no night sweats, no chills  EYES: no vision changes, no blurry vision  EARS: no changes in hearing, no otalgia  NOSE: no epistaxis, no rhinorrhea  RESPIRATORY: No difficulty breathing, no shortness of breath  CV: no chest pain, no peripheral vascular disease  HEME: No coagulation disorder, no bleeding disorder  NEURO: no TIA or stroke-like symptoms  SKIN: No new rashes in the head and neck, no recent skin cancers  MOUTH: No  new ulcers, no recent teeth infections  GASTROINTESTINAL: No diarrhea, stomach pain  PSYCH: No anxiety, no depression      PHYSICAL EXAM  /74   Pulse 84   Temp 98.6 °F (37 °C) (Oral)   Resp 16   Ht 5' 10\" (1.778 m)   Wt 299 lb 9.7 oz (135.9 kg)   SpO2 90%   BMI 42.99 kg/m²     GENERAL: No Acute Distress, Alert and Oriented, no hoarseness  EYES: EOMI, Anti-icteric  NOSE: No epistaxis, nasal mucosa within normal limits, no purulent drainage  EARS: Normal external canal appearance, EAC patent bilaterally  FACE: 1/6 House-Brackmann Scale, symmetric, sensation equal bilaterally  ORAL CAVITY: No masses or lesions palpated, uvula is midline, moist mucous membranes, normal-appearing tonsils, normal dentition  NECK: Normal range of motion, no thyromegaly, trachea is midline, no lymphadenopathy, no neck masses, no crepitus  CHEST: Normal respiratory effort, no retractions, breathing comfortably  SKIN: No rashes, normal appearing skin, no evidence of skin lesions/tumors    LABS  Lab Results   Component Value Date    WBC 9.4 04/19/2022    HGB 13.7 04/19/2022    HCT 40.6 04/19/2022    MCV 86.8 04/19/2022     04/19/2022         RADIOLOGY  Summary of findings:  I independently reviewed her CT neck scan and it shows evidence of scant amount of retropharyngeal fluid collection without evidence of definite abscess. There is some scattered adenopathy. There is no underlying neck lesions. PROCEDURE  Flexible Laryngoscopy CPT Code [id number]    Pre op: Dysphagia. Post op: Same  Procedure : Flexible Laryngoscopy  Surgeon: Shane Krause MD  Anesthesia: Afrin with 4% lidocaine  Indication: Laryngeal mirror examination was not tolerated due to gag reflex  Description:  The scope was passed along the floor of the right naris to the level of the larynx. There was no evidence of concerning masses or lesions of the base of tongue, vallecula, epiglottis, aryepiglottic folds, arytenoids, false vocal folds, true vocal folds, or pyriform sinuses. True vocal folds exhibited symmetric motion bilaterally without evidence of paralysis or paresis. The scope was removed.  The patient tolerated the procedure without difficulty. There were no complications. Pertinent findings: Evidence of retropharyngeal fluid collection. There is evidence of LPR changes with mild interarytenoid edema        ASSESSMENT/PLAN  Sakina Amos is a very pleasant 49 y.o. female with dysphagia/odynophagia, recent URI and retropharyngeal fluid collection.      -Independently reviewed the CT scan. No evidence of abscess. It does appear that there is some mild retropharyngeal fluid. This is nonobstructing. This can occur in a reactive setting. No intervention at this time  -Okay for diet. N.p.o. to order discontinued. Diet per primary team if they wish to do regular or diabetic diet  -Continue IV antibiotics. Would send home with 7 days of Augmentin.  -Can discontinue steroids  -Would send her home on 30 days of PPI. -Recommend that she follow-up with a gastroenterologist for her severe GERD. I have performed a head and neck physical exam personally or was physically present during the key or critical portions of the service. Medical Decision Making:   The following items were considered in medical decision making:  Independent review of images  Review / order clinical lab tests  Review / order radiology tests  Decision to obtain old records  Review and summation of old records as accessed through [place] (a summary of my findings in these old records: )

## 2022-04-19 NOTE — ED NOTES
Pt resting in bed. Denies any needs at this time.  Awaiting on an admission bed      Sydnee Aponte RN  04/18/22 2031

## 2022-04-19 NOTE — ED NOTES
Report given to [facility] RN and transported requested      Mandy Santos RN  04/18/22 2126

## 2022-04-20 VITALS
DIASTOLIC BLOOD PRESSURE: 79 MMHG | BODY MASS INDEX: 41.95 KG/M2 | WEIGHT: 293 LBS | RESPIRATION RATE: 16 BRPM | OXYGEN SATURATION: 93 % | HEART RATE: 60 BPM | HEIGHT: 70 IN | SYSTOLIC BLOOD PRESSURE: 125 MMHG | TEMPERATURE: 97.7 F

## 2022-04-20 LAB
ANION GAP SERPL CALCULATED.3IONS-SCNC: 14 MMOL/L (ref 3–16)
BASOPHILS ABSOLUTE: 0 K/UL (ref 0–0.2)
BASOPHILS RELATIVE PERCENT: 0.2 %
BUN BLDV-MCNC: 21 MG/DL (ref 7–20)
CALCIUM SERPL-MCNC: 9 MG/DL (ref 8.3–10.6)
CHLORIDE BLD-SCNC: 104 MMOL/L (ref 99–110)
CO2: 24 MMOL/L (ref 21–32)
CREAT SERPL-MCNC: 0.7 MG/DL (ref 0.6–1.1)
EOSINOPHILS ABSOLUTE: 0 K/UL (ref 0–0.6)
EOSINOPHILS RELATIVE PERCENT: 0 %
GFR AFRICAN AMERICAN: >60
GFR NON-AFRICAN AMERICAN: >60
GLUCOSE BLD-MCNC: 109 MG/DL (ref 70–99)
GLUCOSE BLD-MCNC: 116 MG/DL (ref 70–99)
HCT VFR BLD CALC: 40.1 % (ref 36–48)
HEMOGLOBIN: 13.3 G/DL (ref 12–16)
LYMPHOCYTES ABSOLUTE: 2.2 K/UL (ref 1–5.1)
LYMPHOCYTES RELATIVE PERCENT: 19 %
MAGNESIUM: 2.3 MG/DL (ref 1.8–2.4)
MCH RBC QN AUTO: 28.7 PG (ref 26–34)
MCHC RBC AUTO-ENTMCNC: 33.2 G/DL (ref 31–36)
MCV RBC AUTO: 86.5 FL (ref 80–100)
MONOCYTES ABSOLUTE: 0.6 K/UL (ref 0–1.3)
MONOCYTES RELATIVE PERCENT: 5.2 %
NEUTROPHILS ABSOLUTE: 8.8 K/UL (ref 1.7–7.7)
NEUTROPHILS RELATIVE PERCENT: 75.6 %
PDW BLD-RTO: 14 % (ref 12.4–15.4)
PERFORMED ON: ABNORMAL
PLATELET # BLD: 330 K/UL (ref 135–450)
PMV BLD AUTO: 8.5 FL (ref 5–10.5)
POTASSIUM SERPL-SCNC: 3.9 MMOL/L (ref 3.5–5.1)
RBC # BLD: 4.64 M/UL (ref 4–5.2)
SODIUM BLD-SCNC: 142 MMOL/L (ref 136–145)
WBC # BLD: 11.6 K/UL (ref 4–11)

## 2022-04-20 PROCEDURE — 85025 COMPLETE CBC W/AUTO DIFF WBC: CPT

## 2022-04-20 PROCEDURE — 80048 BASIC METABOLIC PNL TOTAL CA: CPT

## 2022-04-20 PROCEDURE — 2580000003 HC RX 258: Performed by: STUDENT IN AN ORGANIZED HEALTH CARE EDUCATION/TRAINING PROGRAM

## 2022-04-20 PROCEDURE — 36415 COLL VENOUS BLD VENIPUNCTURE: CPT

## 2022-04-20 PROCEDURE — 6360000002 HC RX W HCPCS: Performed by: STUDENT IN AN ORGANIZED HEALTH CARE EDUCATION/TRAINING PROGRAM

## 2022-04-20 PROCEDURE — 83735 ASSAY OF MAGNESIUM: CPT

## 2022-04-20 RX ORDER — PANTOPRAZOLE SODIUM 40 MG/1
40 TABLET, DELAYED RELEASE ORAL
Qty: 30 TABLET | Refills: 0 | Status: SHIPPED | OUTPATIENT
Start: 2022-04-20

## 2022-04-20 RX ORDER — AMOXICILLIN AND CLAVULANATE POTASSIUM 875; 125 MG/1; MG/1
1 TABLET, FILM COATED ORAL 2 TIMES DAILY
Qty: 14 TABLET | Refills: 0 | Status: SHIPPED | OUTPATIENT
Start: 2022-04-20 | End: 2022-04-27

## 2022-04-20 RX ADMIN — SODIUM CHLORIDE 3000 MG: 900 INJECTION INTRAVENOUS at 05:33

## 2022-04-20 RX ADMIN — ENOXAPARIN SODIUM 30 MG: 100 INJECTION SUBCUTANEOUS at 08:46

## 2022-04-20 RX ADMIN — SODIUM CHLORIDE, PRESERVATIVE FREE 10 ML: 5 INJECTION INTRAVENOUS at 08:30

## 2022-04-20 ASSESSMENT — PAIN SCALES - GENERAL: PAINLEVEL_OUTOF10: 0

## 2022-04-20 NOTE — PLAN OF CARE
Problem: Pain:  Goal: Pain level will decrease  Description: Pain level will decrease  4/20/2022 0947 by Jenelle Lesches, RN  Outcome: Progressing  4/19/2022 2205 by Compa Vanessa RN  Outcome: Ongoing  Goal: Control of acute pain  Description: Control of acute pain  4/20/2022 0947 by Jenelle Lesches, RN  Outcome: Progressing  4/19/2022 2205 by Compa Vanesas RN  Outcome: Ongoing  Goal: Control of chronic pain  Description: Control of chronic pain  Outcome: Progressing     Problem: Falls - Risk of:  Goal: Will remain free from falls  Description: Will remain free from falls  4/20/2022 0947 by Jenelle Lesches, RN  Outcome: Progressing  4/19/2022 2205 by Compa Vanessa RN  Outcome: Ongoing  Goal: Absence of physical injury  Description: Absence of physical injury  4/20/2022 0947 by Jenelle Lesches, RN  Outcome: Progressing  4/19/2022 2205 by Compa Vanessa RN  Outcome: Ongoing     Problem: Discharge Planning  Goal: Discharge to home or other facility with appropriate resources  Outcome: Progressing

## 2022-04-20 NOTE — PLAN OF CARE
Problem: Pain:  Goal: Pain level will decrease  Description: Pain level will decrease  4/20/2022 1056 by Josie Olvera RN  Outcome: Completed  4/20/2022 0947 by Josie Olvera RN  Outcome: Progressing  4/19/2022 2205 by Bj Greer RN  Outcome: Ongoing  Goal: Control of acute pain  Description: Control of acute pain  4/20/2022 1056 by Josie Olvera RN  Outcome: Completed  4/20/2022 0947 by Josie Olvera RN  Outcome: Progressing  4/19/2022 2205 by Bj Greer RN  Outcome: Ongoing  Goal: Control of chronic pain  Description: Control of chronic pain  4/20/2022 1056 by Josie Olvera RN  Outcome: Completed  4/20/2022 0947 by Josie Olvera RN  Outcome: Progressing     Problem: Falls - Risk of:  Goal: Will remain free from falls  Description: Will remain free from falls  4/20/2022 1056 by Josie Olvera RN  Outcome: Completed  4/20/2022 0947 by Josie Olvera RN  Outcome: Progressing  4/19/2022 2205 by Bj Greer RN  Outcome: Ongoing  Goal: Absence of physical injury  Description: Absence of physical injury  4/20/2022 1056 by Josie Olvera RN  Outcome: Completed  4/20/2022 [address] by Josie Olvera RN  Outcome: Progressing  4/19/2022 2205 by Bj Greer RN  Outcome: Ongoing     Problem: Discharge Planning  Goal: Discharge to home or other facility with appropriate resources  4/20/2022 1056 by Josie Olvera RN  Outcome: Completed  4/20/2022 0947 by Josie Olvera RN  Outcome: Progressing

## 2022-04-20 NOTE — PLAN OF CARE
Problem: Pain:  Goal: Pain level will decrease  Description: Pain level will decrease  4/19/2022 2205 by Anne-Marie Thornton RN  Outcome: Ongoing  4/19/2022 0955 by Jai Salcido RN  Outcome: Ongoing     Problem: Pain:  Goal: Control of acute pain  Description: Control of acute pain  4/19/2022 2205 by Anne-Marie Thornton RN  Outcome: Ongoing  4/19/2022 0955 by Jai Salcido RN  Outcome: Ongoing     Pain/discomfort being managed with PRN analgesics per MD orders. Pt able to express presence and absence of pain and rate pain appropriately using numerical scale. Non-pharmacologic comfort measures implemented. Rest and comfort promoted. Problem: Falls - Risk of:  Goal: Will remain free from falls  Description: Will remain free from falls  4/19/2022 2205 by Anne-Marie Thornton RN  Outcome: Ongoing  4/19/2022 0955 by Jai Salcido RN  Outcome: Ongoing     Problem: Falls - Risk of:  Goal: Absence of physical injury  Description: Absence of physical injury  4/19/2022 2205 by Anne-Marie Thornton RN  Outcome: Ongoing  4/19/2022 0955 by Jai Salcido RN  Outcome: Ongoing     Patient uses call light appropriately to express needs. Bed to lowest position with door open and call light in reach. All fall precautions implemented at this time. Siderails up x2. Non skid footwear in place. Seen at intervals to ensure safety. All needs attended. yes

## 2022-04-20 NOTE — PROGRESS NOTES
Patient has been asking for her home meds that hasn't been given since yesterday. Zoloft 50 mg every night, Lipitor 40 mg every night and Melatonin 3mg tab nightly PRN. IMAN Fields made aware, awaiting orders.  Electronically signed by Charmayne Pavy, RN on 4/19/2022 at 8:57 PM

## 2022-04-20 NOTE — PROGRESS NOTES
Removed pt IV and placed a new dry dressing without complications. Pt gathered her belongings together and changed her clothes. Reviewed discharged instructions with pt and answered all questions. Pt waiting on ride from friend to be discharges.

## 2022-04-20 NOTE — PROGRESS NOTES
Orders placed by IMAN Alfonso, carried out.  Electronically signed by Kieran Mireles RN on 4/19/2022 at 9:24 PM

## 2022-04-20 NOTE — CARE COORDINATION
MATIAS ayala rec'd. Spoke with bedside RN, Linda Lopez who reports NO IV therapy needed. She will discuss with her the AVS to see if this will work for her request for Letter for her employer. Family will transport her home. Scripts sent to retail. No needs.   [facility]     Case Management   [phone]    4/20/2022  10:51 AM

## 2022-04-20 NOTE — DISCHARGE SUMMARY
Hospital Medicine Discharge Summary    Patient ID: Beryl Kussmaul      Patient's PCP: Cam Pacheco MD    Admit Date: 4/18/2022     Discharge Date:   04/20/22    Admitting Physician: Claribel Pagan DO     Discharge Physician: Radha Covington NP     Discharge Diagnoses: Active Hospital Problems    Diagnosis     Pharyngeal abscess [F57.7]     Metabolic syndrome [O16.45]     Gastroesophageal reflux disease with esophagitis [K21.00]     Hyperlipidemia [E78.5]        The patient was seen and examined on day of discharge and this discharge summary is in conjunction with any daily progress note from day of discharge. Hospital Course:    The patient is a 51 y.o. female with past medical history as below who presents to [facility] with persistent and worsening right cervical neck pain that she notes started occurring over the course of the past 36 hours or so the previous morning, she was seen in the ED prior day after the pain and started over the course of the morning and she was prescribed pain medication thinking it might be more musculoskeletal.  However, what was also noted was that over the course of the last few weeks she was battling some upper respiratory congestion and also was starting to develop some increasing level of sore throat as well as some difficulty with noted chills and fatigue but what became more concerning was the neck pain which she had never had before along with the other symptoms.  He already started on some antibiotics and steroids for the upper respiratory congestion symptoms about a week and a half ago and she seemed to have improved from those symptoms but the neck pain and the other symptoms of note as above started getting worse in the last 2 to 3 days.  She having contact with anyone sick but does feel that her  may have had similar symptoms other than the neck pain a few weeks ago. Kia Emanuel denies other recent symptoms of dizziness, syncope, chest pain, shortness of breath, dysuria, blood in urine/stool/sputum, leg swelling, rashes, vision change, focal weakness, vomiting or diarrhea.  She has had some nausea today however. Pharyngeal abscess   - CT neck soft tissue revealed: Ill-defined fluid in the retropharyngeal space is nonspecific but could be infectious. Shotty bilateral cervical lymph nodes are not enlarged by CT size criteria, but could be reactive  - ENT consulted, thank you for recommendations  - IV Unasyn - transitioned to augmentin per ENT recs   - Regular diet tolerated well     Severe GERD  - Prescription for protonix   - GI referral     Hx of HLD, controlled with statin. - On home statin - continue  - Follow-up with PCP for med adjustments     Hx of depression, mood is stable. - On home zoloft- continue     Morbid Obesity -  With Body mass index is 42.99 kg/m². Complicating assessment and treatment. Placing patient at risk for multiple co-morbidities as well as early death and contributing to the patient's presentation. Counseled on weight loss          Physical Exam Performed:     /79   Pulse 60   Temp 97.7 °F (36.5 °C) (Oral)   Resp 16   Ht 5' 10\" (1.778 m)   Wt 298 lb 8.1 oz (135.4 kg)   SpO2 93%   BMI 42.83 kg/m²       General appearance:  No apparent distress, appears stated age and cooperative. HEENT:  Normal cephalic, atraumatic without obvious deformity. Pupils equal, round, and reactive to light. Extra ocular muscles intact. Conjunctivae/corneas clear. Neck: Supple, with full range of motion. No jugular venous distention. Trachea midline. Respiratory:  Normal respiratory effort. Clear to auscultation, bilaterally without Rales/Wheezes/Rhonchi. Cardiovascular:  Regular rate and rhythm with normal S1/S2 without murmurs, rubs or gallops. Abdomen: Soft, non-tender, non-distended with normal bowel sounds. Musculoskeletal:  No clubbing, cyanosis or edema bilaterally.   Full range of motion without deformity. Skin: Skin color, texture, turgor normal.  No rashes or lesions. Neurologic:  Neurovascularly intact without any focal sensory/motor deficits. Cranial nerves: II-XII intact, grossly non-focal.  Psychiatric:  Alert and oriented, thought content appropriate, normal insight  Capillary Refill: Brisk,< 3 seconds   Peripheral Pulses: +2 palpable, equal bilaterally       Labs: For convenience and continuity at follow-up the following most recent labs are provided:      CBC:    Lab Results   Component Value Date    WBC 11.6 04/20/2022    HGB 13.3 04/20/2022    HCT 40.1 04/20/2022     04/20/2022       Renal:    Lab Results   Component Value Date     04/20/2022    K 3.9 04/20/2022    K 3.9 04/18/2022     04/20/2022    CO2 24 04/20/2022    BUN 21 04/20/2022    CREATININE 0.7 04/20/2022    CALCIUM 9.0 04/20/2022         Significant Diagnostic Studies    Radiology:   CT SOFT TISSUE NECK W CONTRAST   Final Result   1. Ill-defined fluid in the retropharyngeal space is nonspecific, but could   be infectious. Clinical correlation is recommended. 2. Shotty bilateral cervical lymph nodes are not enlarged by CT size   criteria, but could be reactive.                 Consults:     IP CONSULT TO OTOLARYNGOLOGY  IP CONSULT TO HOSPITALIST  IP CONSULT TO OTOLARYNGOLOGY    Disposition:  Home     Condition at Discharge: Stable    Discharge Instructions/Follow-up:      Follow up with PCP in 1 week    Recommend GI follow up for her GERD    Code Status:  Full Code     Activity: activity as tolerated    Diet: regular diet      Discharge Medications:     Current Discharge Medication List           Details   amoxicillin-clavulanate (AUGMENTIN) 875-125 MG per tablet Take 1 tablet by mouth 2 times daily for 7 days  Qty: 14 tablet, Refills: 0      pantoprazole (PROTONIX) 40 MG tablet Take 1 tablet by mouth every morning (before breakfast)  Qty: 30 tablet, Refills: 0              Details   Semaglutide,0.25 or 0.5MG/DOS, (OZEMPIC, 0.25 OR 0.5 MG/DOSE,) 2 MG/1.5ML SOPN Inject 0.5 mg into the skin once a week Sample from PCP      cyclobenzaprine (FLEXERIL) 10 MG tablet Take 1 tablet by mouth 3 times daily as needed for Muscle spasms  Qty: 20 tablet, Refills: 0      HYDROcodone-acetaminophen (NORCO) 5-325 MG per tablet Take 1 tablet by mouth every 6 hours as needed for Pain for up to 3 days. Qty: 10 tablet, Refills: 0    Associated Diagnoses: Strain of neck muscle, initial encounter      atorvastatin (LIPITOR) 40 MG tablet Take 1 tablet by mouth nightly  Qty: 90 tablet, Refills: 3      melatonin 3 MG TABS tablet Take 3 mg by mouth nightly as needed (sleep)       sertraline (ZOLOFT) 50 MG tablet TAKE 1 TABLET BY MOUTH IN  THE EVENING  Qty: 90 tablet, Refills: 3    Comments: Requesting 1 year supply  Associated Diagnoses: Other depression      Cyanocobalamin (B-12 PO) Take 1,000 Units by mouth daily       MAGNESIUM PO Take 400 mg by mouth daily       Ascorbic Acid (VITAMIN C PO) Take 500 mg by mouth daily       VITAMIN D PO Take 1,000 Units by mouth daily       aspirin 81 MG chewable tablet Take 81 mg by mouth daily             Time Spent on discharge is more than 30 minutes in the examination, evaluation, counseling and review of medications and discharge plan. Signed:    Alina Epps  - NP   4/20/2022      Thank you Manjula Paul MD for the opportunity to be involved in this patient's care. If you have any questions or concerns please feel free to contact me at [phone].

## 2022-04-20 NOTE — CARE COORDINATION
SOCIAL WORK DISCHARGE SUMMARY        DATE OF DISCHARGE:             Wed 4-      LOCATION:                                    Home           TIME:                               Family        PHARMACY:                                   [place], [place]     Case Management   [phone]    4/20/2022  10:52 AM

## 2022-04-20 NOTE — CARE COORDINATION
Chart reviewed. Goal is home with spouse. Chart reflects to continue with IV ABX and to send home with 7 days of Augmentin. Following for DC needs.   [place], [place]     Case Management   [phone]    4/20/2022  9:34 AM

## 2022-04-22 LAB — BLOOD CULTURE, ROUTINE: NORMAL

## 2022-04-23 LAB — CULTURE, BLOOD 2: NORMAL

## 2022-05-10 ENCOUNTER — HOSPITAL ENCOUNTER (OUTPATIENT)
Dept: CARDIAC CATH/INVASIVE PROCEDURES | Age: 52
Discharge: HOME | End: 2022-05-10
Payer: COMMERCIAL

## 2022-05-10 VITALS
OXYGEN SATURATION: 96 % | DIASTOLIC BLOOD PRESSURE: 84 MMHG | TEMPERATURE: 98 F | BODY MASS INDEX: 41.95 KG/M2 | SYSTOLIC BLOOD PRESSURE: 144 MMHG | RESPIRATION RATE: 16 BRPM | WEIGHT: 293 LBS | HEIGHT: 70 IN | HEART RATE: 77 BPM

## 2022-05-10 DIAGNOSIS — I63.9 CEREBROVASCULAR ACCIDENT (CVA), UNSPECIFIED MECHANISM (HCC): ICD-10-CM

## 2022-05-10 DIAGNOSIS — E78.2 MIXED HYPERLIPIDEMIA: ICD-10-CM

## 2022-05-10 PROCEDURE — C1764 EVENT RECORDER, CARDIAC: HCPCS

## 2022-05-10 PROCEDURE — 33285 INSJ SUBQ CAR RHYTHM MNTR: CPT | Performed by: INTERNAL MEDICINE

## 2022-05-10 PROCEDURE — 2500000003 HC RX 250 WO HCPCS

## 2022-05-10 PROCEDURE — 33285 INSJ SUBQ CAR RHYTHM MNTR: CPT

## 2022-05-10 RX ORDER — SODIUM CHLORIDE 0.9 % (FLUSH) 0.9 %
5-40 SYRINGE (ML) INJECTION EVERY 12 HOURS SCHEDULED
OUTPATIENT
Start: 2022-05-10

## 2022-05-10 RX ORDER — SODIUM CHLORIDE 0.9 % (FLUSH) 0.9 %
5-40 SYRINGE (ML) INJECTION PRN
OUTPATIENT
Start: 2022-05-10

## 2022-05-10 RX ORDER — SODIUM CHLORIDE 9 MG/ML
INJECTION, SOLUTION INTRAVENOUS PRN
OUTPATIENT
Start: 2022-05-10

## 2022-05-10 NOTE — H&P
Cardiac Electrophysiology Consultation   Date: 5/10/2022  Reason for Consultation: CVA  Consult Requesting Physician: Caryle Lemma, MD   Primary Care Physician: Caryle Lemma, MD     Chief Complaint:        Chief Complaint   Patient presents with    New Patient       Cerebrovascular accident (CVA), unspecified mechanism [facility])         HPI: Jones Mitchell is a 49 y.o. patient with a history of hyperlipidemia, TIA. She was hospitalized at [facility] [place] from 2022-2022 after presented to the ED with complaint of left arm numbness and tingling for 1 day. MRI showed punctate subacute infarct within the left frontal lobe as well as right parietal lobe. Patient had MRI cervical spine done showed mild to moderate stenosis. Neurology recommended to discharge on aspirin, statin. Echo without any PFO.       She wore FELIBERTO 2022- 3/27/2022 to assess for underlying arrhthymias which may have contributed to her CVA. The monitor showed NSR no arrhythmias seen.     Today, she presents to office to discuss ILR implantation. She states she suffered a subacute stroke and MRI showed old infarction. She saw haematologist and she was told it was 4-8 weeks prior to her hospitalization that she suffered the stroke. She states she does not recall having any symptoms from the stroke. She is compliant with her medications and tolerating them well.  She denies chest pain/pressure, tightness, edema, shortness of breath, heart racing, palpitations, lightheadedness, dizziness, syncope, presyncope,  PND or orthopnea.         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 Payton Reyes    COLONOSCOPY N/A 2020     COLONOSCOPY performed by Michael Hinkle MD at [address]         bilateral            Allergies:  No Known Allergies     Medication:   Home Medications           Prior to Admission medications    Medication Sig Start Date End Date Taking? Authorizing Provider   atorvastatin (LIPITOR) 40 MG tablet Take 1 tablet by mouth nightly 3/7/22 6/5/22 Yes Richard Martinez MD   melatonin 3 MG TABS tablet Take 3 mg by mouth nightly as needed     Yes Historical Provider, MD   sertraline (ZOLOFT) 50 MG tablet TAKE 1 TABLET BY MOUTH IN  THE EVENING 2/14/22   Yes Richard Martinez MD   Dulaglutide 0.75 MG/0.5ML SOPN Inject 0.75 mg into the skin once a week 2/10/22   Yes Miesha Orozco APRN - CNP   Cyanocobalamin (B-12 PO) Take 1,000 Units by mouth daily      Yes Historical Provider, MD   MAGNESIUM PO Take 400 mg by mouth daily      Yes Historical Provider, MD   Ascorbic Acid (VITAMIN C PO) Take 500 mg by mouth daily      Yes Historical Provider, MD   VITAMIN D PO Take 1,000 Units by mouth daily      Yes Historical Provider, MD   aspirin 81 MG chewable tablet Take 81 mg by mouth daily     Yes Historical Provider, MD            Social History:   reports that she has never smoked. She has never used smokeless tobacco. She reports previous alcohol use. She reports that she does not use drugs.         Family History:  family history includes Breast Cancer (age of onset: 76) in her mother. Reviewed.  Denies family history of sudden cardiac death, arrhythmia, premature CAD     Review of System:     · General ROS: negative for - chills, fever   · Psychological ROS: negative for - anxiety or depression  · Ophthalmic ROS: negative for - eye pain or loss of vision  · ENT ROS: negative for - epistaxis, headaches, nasal discharge, sore throat   · Allergy and Immunology ROS: negative for - hives, nasal congestion   · Hematological and Lymphatic ROS: negative for - bleeding problems, blood clots, bruising or jaundice  · Endocrine ROS: negative for - skin changes, temperature intolerance or unexpected weight changes  · Respiratory ROS: negative for - cough, hemoptysis, pleuritic pain, SOB, sputum changes or wheezing  · Cardiovascular ROS: Per HPI. · Gastrointestinal ROS: negative for - abdominal pain, blood in stools, diarrhea, hematemesis, melena, nausea/vomiting or swallowing difficulty/pain  · Genito-Urinary ROS: negative for - dysuria or incontinence  · Musculoskeletal ROS: negative for - joint swelling or muscle pain  · Neurological ROS: negative for - confusion, dizziness, gait disturbance, headaches, numbness/tingling, seizures, speech problems, tremors, visual changes or weakness  · Dermatological ROS: negative for - rash     Physical Examination:      Vitals:     04/04/22 0849   BP: 122/82   Pulse: 80   SpO2: 95%         · Constitutional: Oriented. No distress. · Head: Normocephalic and atraumatic. · Mouth/Throat: Oropharynx is clear and moist.   · Eyes: Conjunctivae normal. EOM are normal.   · Neck: Normal range of motion. Neck supple. No rigidity. No JVD present. · Cardiovascular: Normal rate, regular rhythm, S1&S2 and intact distal pulses. · Pulmonary/Chest: Bilateral respiratory sounds. No wheezes. No rhonchi. · Abdominal: Soft. Bowel sounds present. No distension, No tenderness. · Musculoskeletal: No tenderness. No edema    · Lymphadenopathy: Has no cervical adenopathy. · Neurological: Alert and oriented. Cranial nerve appears intact, No Gross deficit   · Skin: Skin is warm and dry. No rash noted. · Psychiatric: Has a normal mood, affect and behavior      Labs:  Reviewed.      ECG: Sinus  rhythm with v-rate of 66 bpm with QRS duration 92 ms. No pathologic Q waves, ventricular pre-excitation, or QT prolongation.      Studies:   1.  Event monitor:  2/26/2022- 3/27/2022  NSR no arrhythmias seen     2. Echo: 2/23/2022  Summary  Normal left ventricle size, wall thickness, and systolic function with anestimated ejection fraction of 55-60%. No regional wall motion abnormalities are seen. The right ventricle is normal in size and function. Trivial mitral and tricuspid regurgitation. A bubble study was performed and fails to show evidence of shunting. LA volume/Index: 51 ml /21 ml/m2     3. Stress Test:  n/a        4. Cath: n/a     I independently reviewed and interpreted the ECG, MCOT, echocardiogram, stress test, and coronary angiography/PCI results and used them for my plan of care. Procedures:  1.      Assessment/Plan:         CVA  Denies any residual deficits from CVA. FELIBERTO was worn from 2/26/2022- 3/27/2022 which revealed NSR no arrhythmias seen. -Recommend ILR to access for long term monitoring for arrhythmia that may have contributed to her CVA.         We discussed implanting an implantable loop recorder for long-term cardiac dysrhythmia monitoring in order to evaluate for possible atrial fibrillation/atrial flutter as an etiology for the patient's CVA. The benefits and risks of implanting an implantable loop recorder has been discussed with the patient. The risks including, but not limited to, the risks of vascular injury, bleeding, infection, device malfunction, injury to cardiac and surrounding structures (including pneumothorax), stroke, myocardial infarction and death were discussed in detail. The patient was also counseled at length about the risks of emanuel Covid-19 in the jia-operative and post-operative states including the recovery window of their procedure. The patient was made aware that emanuel Covid-19 after a surgical procedure may worsen their prognosis for recovering from the virus and lend to a higher morbidity and or mortality risk. The patient was given the option of postponing their procedure. The patient was also presented reasonable alternatives to the proposed care, treatment, and services.  The discussion I have had with the patient encompassed risks, benefits, and side effects related to the alternatives and the risks related to not receiving the proposed care, treatment and services.      -The patient is aware of and understands the risks and is willing to proceed with the ILR implantation. We will schedule it in the near future.      Mixed hyperlipidemia  -Continue statin therapy Atorvastatin 40 mg daily.     Follow up 1 week after ILR implantation in the device clinic for site check and device interrogation. She will then follow up yearly with [facility] - [facility], CNP.     Thank you for allowing me to participate in the care of Jones Mitchell. All questions and concerns were addressed to the patient/family. Alternatives to my treatment were discussed.      I have reviewed the history and physical and examined the patient and find no relevant changes. I have reviewed with the patient and/or family the risks and benefits to the proposed procedure. The patient was presented with the option of postponing the proposed procedure. The patient was also presented reasonable alternatives to the proposed care, treatment, and services.  The discussion I have had with the patient encompassed risks, benefits, and side effects related to the alternatives and the risks related to not receiving the proposed care, treatment and services.      Obed Cadet MD, [address], [facility] [place] [address] Electrophysiology  [address], [address] [place] [address]  Jaspal Rico [address]  [phone]

## 2022-05-10 NOTE — PROCEDURES
[address]     Electrophysiology Procedure Note       Date of Procedure: 5/10/2022  Patient's Name: Nils Cuellar  YOB: 1970   Medical Record Number: [id number]  Procedure Performed by: Litzy Sierra MD    Procedures performed:    · Loop recorder implantation  · Anesthesia: Local anesthesia care  · Level of sedation plan: none  · Mallampati airway assessment class: I  · ASA class: 1    Indication of the procedure: Syncope, Palpitation   Nils Cuellar is a 49 y.o. female with cerebrovascular accident of unclear etiology. Because the patient is at risk of having atrial dysrhythmia, particularly atrial fibrillation, the decision was made to undergo a procedure that would afford long term rhythm detection. Therefore, the patient has been scheduled to undergo an ILR implantation. Details of procedure:   Procedure's risks, benefits and alternatives of procedure were explained to patient. The risks including, but not limited to, the risks of vascular injury, bleeding, infection, device malfunction, injury to cardiac and surrounding structures (including pneumothorax), stroke, myocardial infarction and death were discussed in detail. The patient was also counseled at length about the risks of emanuel Covid-19 in the jia-operative and post-operative states including the recovery window of their procedure. The patient was made aware that emanuel Covid-19 after a surgical procedure may worsen their prognosis for recovering from the virus and lend to a higher morbidity and or mortality risk. The patient was given the option of postponing their procedure. The patient was also presented reasonable alternatives to the proposed care, treatment, and services. The discussion I have had with the patient encompassed risks, benefits, and side effects related to the alternatives and the risks related to not receiving the proposed care, treatment and services. All questions were answered.  Patient understood and informed consent was obtained. The patient was brought to the electrophysiology lab in a fasting nonsedated state. Patient was prepped and draped in usual sterile fashion. No moderate sedation (conscious sedation) nor general anesthesia was administered or utilized for this procedure. The patient was monitored continuously with ECG, pulse oximetry, blood pressure monitoring, and direct observation. After injection of 0.5% bupivacaine/lidocaine mixture in the left 4th intercostal space, a 5 mm small incision was made using the [employer]-provided scalpel, after which a #11 blade was used to expand the opening of this incision on both ends. Then a [employer]-provided tunneling tool was inserted into this scar in a diagonal trajectory towards the L nipple which created the necessary space to insert the implantable loop recorder. This tool was used to deliver the implantable loop recorder into the created space. Both the plunger and the tunneling tool was then retracted. The surgical scar was taped with Steri-strips and manual pressure was held over the taped area to achieve hemostasis. Specimen collected: none    Estimated blood loss: < 5 cc    The patient tolerated the procedure well without any complications. Device information:   The device is a [employer] Reveal LINQ [id number] SN# [id number] with implant date: 5/10/2022  The device was programmed to detect:   VT: 380 ms 16 beats   Bradycardia: 2000 ms     Impression:    Pre- and post-procedural diagnoses of CVA of unclear etiology with successful implantation of a [employer] Implantable Loop Recorder. Plan:   The patient will have usual post-implant care with a 1-week wound check with our nurse in the [facility] at [facility]. From an EP perspective, the patient may be discharged home today if the patient remains stable. Follow-up also includes routine device interrogation with the Device Clinic.      Thank you for allowing us to participate in the care of your patient. If you have any questions or comments, please feel free to contact us.     Lindsey Nash MD, MS, [facility], [facility]  Cardiac Electrophysiology  [address]  [address] [facility], [address] [place] [address]  Jaspal Rico [address]  [phone]

## 2022-05-17 ENCOUNTER — NURSE ONLY (OUTPATIENT)
Dept: CARDIOLOGY CLINIC | Age: 52
End: 2022-05-17

## 2022-05-17 PROBLEM — Z95.818 STATUS POST PLACEMENT OF IMPLANTABLE LOOP RECORDER: Status: ACTIVE | Noted: 2022-05-10

## 2022-05-17 NOTE — PROGRESS NOTES
Pt seen in clinic today for cardiac device interrogation and site check 1 week post implant. anterior bandage removed prior to today's visit. Steri strips left in place today. Site appears dry, well approximated, and free on indication of infection. Wound care instructions reviewed with patient. Their device is a MDT KOREY lnq22 - implanted for CVA- suspected AF    The device appears to be functioning normally. Remaining battery life is \"Good\"    Pt was informed of findings today and general questions have been answered with regard to device. Home monitoring hardware is transmitting.

## 2022-05-26 ENCOUNTER — NURSE ONLY (OUTPATIENT)
Dept: CARDIOLOGY CLINIC | Age: 52
End: 2022-05-26
Payer: COMMERCIAL

## 2022-05-26 DIAGNOSIS — Z95.818 STATUS POST PLACEMENT OF IMPLANTABLE LOOP RECORDER: ICD-10-CM

## 2022-05-26 DIAGNOSIS — I63.9 CRYPTOGENIC STROKE (HCC): ICD-10-CM

## 2022-05-26 PROCEDURE — 93298 REM INTERROG DEV EVAL SCRMS: CPT | Performed by: INTERNAL MEDICINE

## 2022-05-26 PROCEDURE — G2066 INTER DEVC REMOTE 30D: HCPCS | Performed by: INTERNAL MEDICINE

## 2022-06-30 ENCOUNTER — NURSE ONLY (OUTPATIENT)
Dept: CARDIOLOGY CLINIC | Age: 52
End: 2022-06-30
Payer: COMMERCIAL

## 2022-06-30 DIAGNOSIS — Z86.73 HISTORY OF TIA (TRANSIENT ISCHEMIC ATTACK): ICD-10-CM

## 2022-06-30 DIAGNOSIS — Z95.818 STATUS POST PLACEMENT OF IMPLANTABLE LOOP RECORDER: ICD-10-CM

## 2022-06-30 PROCEDURE — 93298 REM INTERROG DEV EVAL SCRMS: CPT | Performed by: INTERNAL MEDICINE

## 2022-06-30 PROCEDURE — G2066 INTER DEVC REMOTE 30D: HCPCS | Performed by: INTERNAL MEDICINE

## 2022-07-01 ENCOUNTER — HOSPITAL ENCOUNTER (OUTPATIENT)
Dept: WOMENS IMAGING | Age: 52
Discharge: HOME | End: 2022-07-01
Payer: COMMERCIAL

## 2022-07-01 DIAGNOSIS — Z12.31 VISIT FOR SCREENING MAMMOGRAM: ICD-10-CM

## 2022-07-01 PROCEDURE — 77063 BREAST TOMOSYNTHESIS BI: CPT

## 2022-07-01 NOTE — PROGRESS NOTES
ILR for CVA. We received a remote transmission from patient's monitor at home. Remote Linq report shows no recorded arrhythmias/ECGs. EP physician to review. We will continue to monitor remotely.      (End of 31-day monitoring period 6/30/22)

## 2022-07-05 ENCOUNTER — TELEPHONE (OUTPATIENT)
Dept: FAMILY MEDICINE CLINIC | Age: 52
End: 2022-07-05

## 2022-07-05 NOTE — TELEPHONE ENCOUNTER
No samples available     Patient notified and advised to call back later in the week as call was placed to rep today

## 2022-07-05 NOTE — TELEPHONE ENCOUNTER
Wanting to know if there are any samples of ozempic that she can have? States having difficulty having it covered under insurance.  Please call Mahin Whalen back at [phone]

## 2022-07-14 ENCOUNTER — TELEPHONE (OUTPATIENT)
Dept: FAMILY MEDICINE CLINIC | Age: 52
End: 2022-07-14

## 2022-07-14 NOTE — TELEPHONE ENCOUNTER
Calling to find out if have any samples of ozempic available? Please advise.  Please call pt back at [phone]

## 2022-07-15 NOTE — TELEPHONE ENCOUNTER
Called patient to advise no samples available at the time and to check next week as call was placed to Angela for samples.     Call complete

## 2022-07-21 NOTE — TELEPHONE ENCOUNTER
Pt called back in to see if we have received any Ozempic samples yet. Please advise.  Pt is reachable at [phone]

## 2022-08-04 ENCOUNTER — NURSE ONLY (OUTPATIENT)
Dept: CARDIOLOGY CLINIC | Age: 52
End: 2022-08-04
Payer: COMMERCIAL

## 2022-08-04 DIAGNOSIS — I63.9 CRYPTOGENIC STROKE (HCC): ICD-10-CM

## 2022-08-04 DIAGNOSIS — Z95.818 STATUS POST PLACEMENT OF IMPLANTABLE LOOP RECORDER: Primary | ICD-10-CM

## 2022-08-04 PROCEDURE — G2066 INTER DEVC REMOTE 30D: HCPCS | Performed by: INTERNAL MEDICINE

## 2022-08-04 PROCEDURE — 93298 REM INTERROG DEV EVAL SCRMS: CPT | Performed by: INTERNAL MEDICINE

## 2022-08-05 NOTE — PROGRESS NOTES
ILR for CVA. We received a remote transmission from patient's monitor at home. Remote Linq report shows no recorded arrhythmias/ECGs. EP physician to review. We will continue to monitor remotely.      (End of 31-day monitoring period 8/4/22)

## 2022-08-08 ENCOUNTER — TELEMEDICINE (OUTPATIENT)
Dept: FAMILY MEDICINE CLINIC | Age: 52
End: 2022-08-08
Payer: COMMERCIAL

## 2022-08-08 ENCOUNTER — TELEPHONE (OUTPATIENT)
Dept: FAMILY MEDICINE CLINIC | Age: 52
End: 2022-08-08

## 2022-08-08 DIAGNOSIS — U07.1 COVID: Primary | ICD-10-CM

## 2022-08-08 PROCEDURE — 99213 OFFICE O/P EST LOW 20 MIN: CPT | Performed by: FAMILY MEDICINE

## 2022-08-08 SDOH — ECONOMIC STABILITY: FOOD INSECURITY: WITHIN THE PAST 12 MONTHS, YOU WORRIED THAT YOUR FOOD WOULD RUN OUT BEFORE YOU GOT MONEY TO BUY MORE.: NEVER TRUE

## 2022-08-08 SDOH — ECONOMIC STABILITY: FOOD INSECURITY: WITHIN THE PAST 12 MONTHS, THE FOOD YOU BOUGHT JUST DIDN'T LAST AND YOU DIDN'T HAVE MONEY TO GET MORE.: NEVER TRUE

## 2022-08-08 ASSESSMENT — SOCIAL DETERMINANTS OF HEALTH (SDOH): HOW HARD IS IT FOR YOU TO PAY FOR THE VERY BASICS LIKE FOOD, HOUSING, MEDICAL CARE, AND HEATING?: NOT HARD AT ALL

## 2022-08-08 NOTE — PROGRESS NOTES
2022    Jonas Maharaj (:  1970) is a 47 y.o. female, here for evaluation of the following chief complaint(s):  Positive For Covid-19      ASSESSMENT/PLAN:     Diagnosis Orders   1. COVID      Paxlovid RX'd no lipitor for 10 days quarantine/mask use advised          No follow-ups on file. An electronic signature was used to authenticate this note. SUBJECTIVE/OBJECTIVE:  (NOTE : prior results listed below reviewed at this visit to assist in medical decision making.)    HPI / ROS    # acute COVID positive day 5 of sx. First day positive test; had negatives day 1 and 2 of symptoms              Wt Readings from Last 3 Encounters:   05/10/22 297 lb (134.7 kg)   22 298 lb 8.1 oz (135.4 kg)   22 (!) 306 lb (138.8 kg)       BP Readings from Last 3 Encounters:   05/10/22 (!) 144/84   22 125/79   22 (!) 152/92       PHYSICAL EXAM  There were no vitals filed for this visit.   A&o speaking easily in complete paragraphs

## 2022-08-08 NOTE — TELEPHONE ENCOUNTER
Pt has cough and congestion. Pt took 2 covid test last week, Thursday and Friday both were negative. Pt went out of town all weekend. Pt took another test today and it was positive. Pt is supposed to go out of town again this friday and wants to know if that is ok? Pt wants to know what she needs to take for the cough and what else she needs to do. Pt states she was taken to the hospital on Saturday because she almost passed out. Blood pressure was very low. Pt was not tested at the hospital because she told them she tested on Thursday and Friday and was negative. Please advise. [facility] [place].  Pt is reachable at [phone]

## 2022-08-18 ENCOUNTER — TELEPHONE (OUTPATIENT)
Dept: FAMILY MEDICINE CLINIC | Age: 52
End: 2022-08-18

## 2022-08-18 DIAGNOSIS — U07.1 COVID: ICD-10-CM

## 2022-08-18 DIAGNOSIS — R51.9 ACUTE NONINTRACTABLE HEADACHE, UNSPECIFIED HEADACHE TYPE: ICD-10-CM

## 2022-08-18 DIAGNOSIS — R05.9 COUGH: Primary | ICD-10-CM

## 2022-08-18 RX ORDER — HYDROCODONE BITARTRATE AND ACETAMINOPHEN 5; 325 MG/1; MG/1
1 TABLET ORAL EVERY 6 HOURS PRN
Qty: 20 TABLET | Refills: 0 | Status: SHIPPED | OUTPATIENT
Start: 2022-08-18 | End: 2022-08-23

## 2022-08-18 NOTE — TELEPHONE ENCOUNTER
Pt called in stating she was given the medicine for covid and she took it but she is no better, she actually thinks she is worse, pt now has a lot of congestion, headache which she believes is from the constant cough.  Pt is reachable at [phone]

## 2022-08-25 ENCOUNTER — TELEPHONE (OUTPATIENT)
Dept: FAMILY MEDICINE CLINIC | Age: 52
End: 2022-08-25

## 2022-08-25 ENCOUNTER — NURSE ONLY (OUTPATIENT)
Dept: FAMILY MEDICINE CLINIC | Age: 52
End: 2022-08-25
Payer: COMMERCIAL

## 2022-08-25 DIAGNOSIS — N30.00 ACUTE CYSTITIS WITHOUT HEMATURIA: ICD-10-CM

## 2022-08-25 DIAGNOSIS — N30.00 ACUTE CYSTITIS WITHOUT HEMATURIA: Primary | ICD-10-CM

## 2022-08-25 LAB
BILIRUBIN, POC: NORMAL
BLOOD URINE, POC: NORMAL
CLARITY, POC: NORMAL
COLOR, POC: NORMAL
GLUCOSE URINE, POC: NORMAL
KETONES, POC: NORMAL
LEUKOCYTE EST, POC: NORMAL
NITRITE, POC: NORMAL
PH, POC: 5.5
PROTEIN, POC: NORMAL
SPECIFIC GRAVITY, POC: 1.03
UROBILINOGEN, POC: 0.2

## 2022-08-25 PROCEDURE — 81002 URINALYSIS NONAUTO W/O SCOPE: CPT | Performed by: NURSE PRACTITIONER

## 2022-08-25 RX ORDER — NITROFURANTOIN 25; 75 MG/1; MG/1
100 CAPSULE ORAL 2 TIMES DAILY
Qty: 20 CAPSULE | Refills: 0 | Status: SHIPPED | OUTPATIENT
Start: 2022-08-25 | End: 2022-09-04

## 2022-08-25 NOTE — TELEPHONE ENCOUNTER
I sent a prescription for macrobid twice daily to her pharmacy. I would much prefer if she was able to stop by office and give urine sample before starting the antibiotic. I ordered UA/culture. We are here until 5. If she is not able no problem it would just be best to be sure we are giving her the right antibiotic. Please document call and then close encounter.   thanks

## 2022-08-25 NOTE — TELEPHONE ENCOUNTER
States she has been treated for covid- has had symptoms x 3 weeks. Feeling a little bit better, but now feels like she has UTI. Has incontinence- unable to control bladder, has pressure, frequency, urgency. Going through poise pads frequently. Wanting to know if you will call something in, want to see her . Please advise. [facility] [place] [phone].  Pt is reachable at [phone]

## 2022-08-26 LAB — URINE CULTURE, ROUTINE: NORMAL

## 2022-08-28 ENCOUNTER — APPOINTMENT (OUTPATIENT)
Dept: CT IMAGING | Age: 52
End: 2022-08-28
Payer: COMMERCIAL

## 2022-08-28 ENCOUNTER — HOSPITAL ENCOUNTER (EMERGENCY)
Age: 52
Discharge: HOME | End: 2022-08-28
Attending: EMERGENCY MEDICINE
Payer: COMMERCIAL

## 2022-08-28 VITALS
DIASTOLIC BLOOD PRESSURE: 86 MMHG | BODY MASS INDEX: 41.95 KG/M2 | RESPIRATION RATE: 22 BRPM | HEIGHT: 70 IN | OXYGEN SATURATION: 96 % | TEMPERATURE: 98.3 F | HEART RATE: 82 BPM | WEIGHT: 293 LBS | SYSTOLIC BLOOD PRESSURE: 125 MMHG

## 2022-08-28 DIAGNOSIS — R06.09 DYSPNEA ON EXERTION: ICD-10-CM

## 2022-08-28 DIAGNOSIS — R91.8 RIGHT LOWER LOBE PULMONARY INFILTRATE: Primary | ICD-10-CM

## 2022-08-28 LAB
ANION GAP SERPL CALCULATED.3IONS-SCNC: 8 MMOL/L (ref 3–16)
BASOPHILS ABSOLUTE: 0.1 K/UL (ref 0–0.2)
BASOPHILS RELATIVE PERCENT: 0.7 %
BILIRUBIN URINE: NEGATIVE
BLOOD, URINE: NEGATIVE
BUN BLDV-MCNC: 10 MG/DL (ref 7–20)
CALCIUM SERPL-MCNC: 9.3 MG/DL (ref 8.3–10.6)
CHLORIDE BLD-SCNC: 103 MMOL/L (ref 99–110)
CLARITY: CLEAR
CO2: 26 MMOL/L (ref 21–32)
COLOR: YELLOW
CREAT SERPL-MCNC: 0.7 MG/DL (ref 0.6–1.1)
EOSINOPHILS ABSOLUTE: 0.4 K/UL (ref 0–0.6)
EOSINOPHILS RELATIVE PERCENT: 5.6 %
GFR AFRICAN AMERICAN: >60
GFR NON-AFRICAN AMERICAN: >60
GLUCOSE BLD-MCNC: 101 MG/DL (ref 70–99)
GLUCOSE URINE: NEGATIVE MG/DL
HCT VFR BLD CALC: 38.8 % (ref 36–48)
HEMOGLOBIN: 13.5 G/DL (ref 12–16)
KETONES, URINE: ABNORMAL MG/DL
LEUKOCYTE ESTERASE, URINE: NEGATIVE
LYMPHOCYTES ABSOLUTE: 2 K/UL (ref 1–5.1)
LYMPHOCYTES RELATIVE PERCENT: 28.3 %
MCH RBC QN AUTO: 29.9 PG (ref 26–34)
MCHC RBC AUTO-ENTMCNC: 34.7 G/DL (ref 31–36)
MCV RBC AUTO: 86 FL (ref 80–100)
MICROSCOPIC EXAMINATION: ABNORMAL
MONOCYTES ABSOLUTE: 0.5 K/UL (ref 0–1.3)
MONOCYTES RELATIVE PERCENT: 7 %
NEUTROPHILS ABSOLUTE: 4.1 K/UL (ref 1.7–7.7)
NEUTROPHILS RELATIVE PERCENT: 58.4 %
NITRITE, URINE: NEGATIVE
PDW BLD-RTO: 14.4 % (ref 12.4–15.4)
PH UA: 5 (ref 5–8)
PLATELET # BLD: 326 K/UL (ref 135–450)
PMV BLD AUTO: 8.1 FL (ref 5–10.5)
POTASSIUM REFLEX MAGNESIUM: 4 MMOL/L (ref 3.5–5.1)
PRO-BNP: 93 PG/ML (ref 0–124)
PROTEIN UA: NEGATIVE MG/DL
RBC # BLD: 4.51 M/UL (ref 4–5.2)
SODIUM BLD-SCNC: 137 MMOL/L (ref 136–145)
SPECIFIC GRAVITY UA: 1.02 (ref 1–1.03)
TROPONIN: <0.01 NG/ML
URINE REFLEX TO CULTURE: ABNORMAL
URINE TYPE: ABNORMAL
UROBILINOGEN, URINE: 1 E.U./DL
WBC # BLD: 7.1 K/UL (ref 4–11)

## 2022-08-28 PROCEDURE — 71260 CT THORAX DX C+: CPT | Performed by: EMERGENCY MEDICINE

## 2022-08-28 PROCEDURE — 84484 ASSAY OF TROPONIN QUANT: CPT

## 2022-08-28 PROCEDURE — 83880 ASSAY OF NATRIURETIC PEPTIDE: CPT

## 2022-08-28 PROCEDURE — 80048 BASIC METABOLIC PNL TOTAL CA: CPT

## 2022-08-28 PROCEDURE — 99285 EMERGENCY DEPT VISIT HI MDM: CPT

## 2022-08-28 PROCEDURE — 6370000000 HC RX 637 (ALT 250 FOR IP): Performed by: EMERGENCY MEDICINE

## 2022-08-28 PROCEDURE — 81003 URINALYSIS AUTO W/O SCOPE: CPT

## 2022-08-28 PROCEDURE — 6360000004 HC RX CONTRAST MEDICATION: Performed by: EMERGENCY MEDICINE

## 2022-08-28 PROCEDURE — 85025 COMPLETE CBC W/AUTO DIFF WBC: CPT

## 2022-08-28 PROCEDURE — 93005 ELECTROCARDIOGRAM TRACING: CPT | Performed by: EMERGENCY MEDICINE

## 2022-08-28 RX ORDER — BENZONATATE 100 MG/1
200 CAPSULE ORAL ONCE
Status: COMPLETED | OUTPATIENT
Start: 2022-08-28 | End: 2022-08-28

## 2022-08-28 RX ORDER — CODEINE PHOSPHATE AND GUAIFENESIN 10; 100 MG/5ML; MG/5ML
10 SOLUTION ORAL 3 TIMES DAILY PRN
Qty: 180 ML | Refills: 0 | Status: SHIPPED | OUTPATIENT
Start: 2022-08-28 | End: 2022-08-28

## 2022-08-28 RX ORDER — CODEINE PHOSPHATE AND GUAIFENESIN 10; 100 MG/5ML; MG/5ML
10 SOLUTION ORAL 3 TIMES DAILY PRN
Qty: 180 ML | Refills: 0 | Status: SHIPPED | OUTPATIENT
Start: 2022-08-28 | End: 2022-09-02

## 2022-08-28 RX ORDER — BENZONATATE 100 MG/1
100-200 CAPSULE ORAL 3 TIMES DAILY PRN
Qty: 40 CAPSULE | Refills: 0 | Status: SHIPPED | OUTPATIENT
Start: 2022-08-28 | End: 2022-09-07

## 2022-08-28 RX ORDER — DOXYCYCLINE HYCLATE 100 MG
100 TABLET ORAL 2 TIMES DAILY
Qty: 20 TABLET | Refills: 0 | Status: SHIPPED | OUTPATIENT
Start: 2022-08-28 | End: 2022-09-07

## 2022-08-28 RX ORDER — DOXYCYCLINE HYCLATE 100 MG
100 TABLET ORAL 2 TIMES DAILY
Qty: 20 TABLET | Refills: 0 | Status: SHIPPED | OUTPATIENT
Start: 2022-08-28 | End: 2022-08-28

## 2022-08-28 RX ORDER — BENZONATATE 100 MG/1
100-200 CAPSULE ORAL 3 TIMES DAILY PRN
Qty: 40 CAPSULE | Refills: 0 | Status: SHIPPED | OUTPATIENT
Start: 2022-08-28 | End: 2022-08-28

## 2022-08-28 RX ADMIN — IOPAMIDOL 75 ML: 755 INJECTION, SOLUTION INTRAVENOUS at 17:12

## 2022-08-28 RX ADMIN — BENZONATATE 200 MG: 100 CAPSULE ORAL at 16:01

## 2022-08-28 ASSESSMENT — PAIN - FUNCTIONAL ASSESSMENT
PAIN_FUNCTIONAL_ASSESSMENT: NONE - DENIES PAIN
PAIN_FUNCTIONAL_ASSESSMENT: NONE - DENIES PAIN

## 2022-08-28 NOTE — ED PROVIDER NOTES
[facility]      Pt Name: Jyoti Fulton  MRN: [id number]  [place] 1970  Date of evaluation: 8/28/2022  Provider: Connie Sanchez MD    CHIEF COMPLAINT     Not feeling good  HISTORY OF PRESENT ILLNESS  (Location/Symptom, Timing/Onset,Context/Setting, Quality, Duration, Modifying Factors, Severity). Note limiting factors. Chief Complaint   Patient presents with    Shortness of Breath     Pt had covid at the beginning of the month. Pt has SOB getting more severe since August 4th, pt has cough. Jyoti Fulton is a 47 y.o. female who presents to the emergency department secondary to concern for not feeling well. She reports that about a month ago she was diagnosed with COVID. She was started on Paxil by but unfortunately did have rebound symptoms. She has been vaccinated for COVID and had 1 booster. She reports that she was not feeling better in terms of a cough and just feeling really fatigued/deconditioned. She states that the cough has been making her pee on herself and she went in on Thursday to give a sample of urine to her primary care and was started on Macrobid. She does not know the results of that urine test.  She denies any recent nausea vomiting fevers or chills. Denies any active chest pain apart from it aching when she coughs. She states her main thing is walking even short distances feels like a marathon. Past medical history noted below. Denies smoking. Aside from what is stated above denies any other symptoms or modifying factors. Nursing Notes reviewed. REVIEW OF SYSTEMS  (2-9 systems for level 4, 10 or more for level 5)   Review of Systems Pertinent positive and negative findings as documented in the HPI; otherwise all other systems were reviewed and were negative.    PAST MEDICAL HISTORY     Past Medical History:   Diagnosis Date    Arthritis     Cancer [facility])     basal-skin    Depression Hyperlipidemia        SURGICALHISTORY       Past Surgical History:   Procedure Laterality Date     SECTION      x1    CHOLECYSTECTOMY      COLONOSCOPY  2020    Dr. Kip Brooks    COLONOSCOPY N/A 2020    COLONOSCOPY performed by Sarah Ayon MD at [facility]      bilateral    INSERTABLE CARDIAC MONITOR       CURRENT MEDICATIONS       Current Discharge Medication List        CONTINUE these medications which have NOT CHANGED    Details   nitrofurantoin, macrocrystal-monohydrate, (MACROBID) 100 MG capsule Take 1 capsule by mouth 2 times daily for 10 days  Qty: 20 capsule, Refills: 0    Associated Diagnoses: Acute cystitis without hematuria      nirmatrelvir/ritonavir (PAXLOVID) 20 x 150 MG & 10 x 100MG Take 3 tablets (two 150 mg nirmatrelvir and one 100 mg ritonavir tablets) by mouth every 12 hours for 5 days.   Qty: 30 tablet, Refills: 0      pantoprazole (PROTONIX) 40 MG tablet Take 1 tablet by mouth every morning (before breakfast)  Qty: 30 tablet, Refills: 0      Semaglutide,0.25 or 0.5MG/DOS, (OZEMPIC, 0.25 OR 0.5 MG/DOSE,) 2 MG/1.5ML SOPN Inject 0.5 mg into the skin once a week Sample from PCP      cyclobenzaprine (FLEXERIL) 10 MG tablet Take 1 tablet by mouth 3 times daily as needed for Muscle spasms  Qty: 20 tablet, Refills: 0      atorvastatin (LIPITOR) 40 MG tablet Take 1 tablet by mouth nightly  Qty: 90 tablet, Refills: 3      melatonin 3 MG TABS tablet Take 3 mg by mouth nightly as needed (sleep)       sertraline (ZOLOFT) 50 MG tablet TAKE 1 TABLET BY MOUTH IN  THE EVENING  Qty: 90 tablet, Refills: 3    Comments: Requesting 1 year supply  Associated Diagnoses: Other depression      Cyanocobalamin (B-12 PO) Take 1,000 Units by mouth daily       MAGNESIUM PO Take 400 mg by mouth daily       Ascorbic Acid (VITAMIN C PO) Take 500 mg by mouth daily       VITAMIN D PO Take 1,000 Units by mouth daily       aspirin 81 MG chewable tablet Take 81 mg by mouth daily            ALLERGIES     Patient has no known allergies. FAMILY HISTORY       Family History   Problem Relation Age of Onset    Breast Cancer Mother 76     SOCIAL HISTORY       Social History     Socioeconomic History    Marital status:    Tobacco Use    Smoking status: Never    Smokeless tobacco: Never   Vaping Use    Vaping Use: Never used   Substance and Sexual Activity    Alcohol use: Yes     Comment: socially    Drug use: Never    Sexual activity: Yes     Partners: Male     Social Determinants of Health     Financial Resource Strain: Low Risk     Difficulty of Paying Living Expenses: Not hard at all   Food Insecurity: No Food Insecurity    Worried About [address] in the Last Year: Never true    [address] [facility] [employer] in the Last Year: Never true     SCREENINGS    [place] Coma Scale  Eye Opening: Spontaneous  Best Verbal Response: Oriented  Best Motor Response: Obeys commands  Jassi Coma Scale Score: 15    PHYSICAL EXAM  (up to 7 for level 4, 8 or more for level 5)   INITIAL VITALS: BP: (!) 143/89, Temp: 97.4 °F (36.3 °C), Heart Rate: 95, Resp: 20, SpO2: 93 %   Physical Exam  Vitals reviewed. Constitutional:       Appearance: She is obese. She is ill-appearing (appears to feel unwell). She is not toxic-appearing or diaphoretic. HENT:      Head: Normocephalic and atraumatic. Right Ear: External ear normal.      Left Ear: External ear normal.   Eyes:      General: No scleral icterus. Conjunctiva/sclera: Conjunctivae normal.   Neck:      Trachea: No tracheal deviation. Cardiovascular:      Rate and Rhythm: Normal rate. Pulses: Normal pulses. Pulmonary:      Effort: Pulmonary effort is normal. No respiratory distress. Breath sounds: Decreased breath sounds (throughout, somewhat limited by body habitus) present. No wheezing or rhonchi. Abdominal:      Palpations: Abdomen is soft. Tenderness: There is no abdominal tenderness.    Musculoskeletal: Cervical back: Normal range of motion. Right lower leg: No tenderness. No edema. Left lower leg: No tenderness. No edema. Skin:     General: Skin is dry. Capillary Refill: Capillary refill takes less than 2 seconds. Neurological:      General: No focal deficit present. Mental Status: She is alert and oriented to person, place, and time. Psychiatric:         Behavior: Behavior normal.     DIAGNOSTIC RESULTS   EKG: interpreted by the Emergency Department Physician who either signs or Co-signs this chart in the absence of a cardiologist.  Indication: Shortness of breath  Interpretation: Rate 78, rhythm sinus, axis normal.  ME/QRS/QTc within normal limits. No T/ST changes consistent with acute ischemia. Comparison to prior EKG from April 4, 2022 shows no acute ischemic changes noted. RADIOLOGY:   Interpretation per Radiologist below, if available at the time of this note:  CT CHEST PULMONARY EMBOLISM W CONTRAST   Final Result   No evidence of a pulmonary embolus. Mildly dilated and atherosclerotic thoracic aorta with no aneurysm or   dissection and unremarkable mediastinum. Subsegmental atelectasis vs early infiltrate along the right lung base   extending posteriorly. Suggest follow-up with serial chest x-rays.            LABS:  Labs Reviewed   BASIC METABOLIC PANEL W/ REFLEX TO MG FOR LOW K - Abnormal; Notable for the following components:       Result Value    Glucose 101 (*)     All other components within normal limits   URINALYSIS WITH REFLEX TO CULTURE - Abnormal; Notable for the following components:    Ketones, Urine TRACE (*)     All other components within normal limits   CBC WITH AUTO DIFFERENTIAL   TROPONIN   BRAIN NATRIURETIC PEPTIDE       EMERGENCY DEPARTMENT COURSE and DIFFERENTIAL DIAGNOSIS/MDM:   Patient was given the following medications:  Orders Placed This Encounter   Medications    benzonatate (TESSALON) capsule 200 mg    iopamidol (ISOVUE-370) 76 % injection 75 mL    DISCONTD: benzonatate (TESSALON PERLES) 100 MG capsule     Sig: Take 1-2 capsules by mouth 3 times daily as needed for Cough     Dispense:  40 capsule     Refill:  0    DISCONTD: guaiFENesin-codeine (GUAIFENESIN AC) 100-10 MG/5ML liquid     Sig: Take 10 mLs by mouth 3 times daily as needed for Cough for up to 5 days. Dispense:  180 mL     Refill:  0    DISCONTD: doxycycline hyclate (VIBRA-TABS) 100 MG tablet     Sig: Take 1 tablet by mouth 2 times daily for 10 days     Dispense:  20 tablet     Refill:  0    doxycycline hyclate (VIBRA-TABS) 100 MG tablet     Sig: Take 1 tablet by mouth 2 times daily for 10 days     Dispense:  20 tablet     Refill:  0    guaiFENesin-codeine (GUAIFENESIN AC) 100-10 MG/5ML liquid     Sig: Take 10 mLs by mouth 3 times daily as needed for Cough for up to 5 days. Dispense:  180 mL     Refill:  0    benzonatate (TESSALON PERLES) 100 MG capsule     Sig: Take 1-2 capsules by mouth 3 times daily as needed for Cough     Dispense:  40 capsule     Refill:  0     CONSULTS:  None    INITIAL VITALS: BP: (!) 143/89, Temp: 97.4 °F (36.3 °C), Heart Rate: 95, Resp: 20, SpO2: 93 %   RECENT VITALS:  BP: 125/86,Temp: 98.3 °F (36.8 °C), Heart Rate: 82, Resp: 22, SpO2: 96 %     Is this patient to be included in the SEP-1 Core Measure due to severe sepsis or septic shock? No   Exclusion criteria - the patient is NOT to be included for SEP-1 Core Measure due to:  2+ SIRS criteria are not met    Parker Dobson is a 47 y.o. female who presents to the emergency department secondary to concern for symptoms as noted in HPI. On arrival she is awake, alert, oriented. Her vitals are hemodynamically stable. Her physical exam is largely reassuring as noted above. Given her history of COVID and her shortness of breath she did have a broad work-up done to evaluate for potential signs of heart failure, pneumonia, PE.     A peripheral IV was placed, labs were ordered along with imaging. EKG without acute ischemic changes. Labs reassuring. CT scan shows no signs of PE but does show signs of an early infiltrate in the right lower lobe versus an early pneumonia. On reassessment discussed results of labs and imaging. Discussed use of incentive spirometer. Discussed risks/benefits of antibiotics since it is a potential early pneumonia versus atelectasis. Patient preference given length of time she has been ill is to go ahead and start the antibiotics. This was prescribed in addition to [employer] and codeine cough syrup. Discussed the risks associated with codeine as it is a narcotic. Discussed return precautions as well as follow-up. She expressed understanding of all instructions, was in agreement with plan, and was discharged home in stable condition. FINAL IMPRESSION      1. Right lower lobe pulmonary infiltrate    2. Dyspnea on exertion        DISPOSITION/PLAN   DISPOSITION Decision To Discharge 08/28/2022 07:35:30 PM      PATIENT REFERRED TO:  Alicia Grant MD  [address] [place] [address]  [phone]    Call   For follow up appointment    DISCHARGE MEDICATIONS:  Current Discharge Medication List        START taking these medications    Details   doxycycline hyclate (VIBRA-TABS) 100 MG tablet Take 1 tablet by mouth 2 times daily for 10 days  Qty: 20 tablet, Refills: 0      guaiFENesin-codeine (GUAIFENESIN AC) 100-10 MG/5ML liquid Take 10 mLs by mouth 3 times daily as needed for Cough for up to 5 days.   Qty: 180 mL, Refills: 0    Associated Diagnoses: Dyspnea on exertion; Right lower lobe pulmonary infiltrate      benzonatate (TESSALON PERLES) 100 MG capsule Take 1-2 capsules by mouth 3 times daily as needed for Cough  Qty: 40 capsule, Refills: 0                  (Please note that portions of this note were completed with a voice recognition program. Efforts were made to edit the dictations but occasionally words are mis-transcribed.)    Priscilla Colin Idania Claudio MD (electronically signed)  Attending Emergency Physician     Norman Tian MD  08/28/22 0666

## 2022-08-28 NOTE — Clinical Note
Jarred Layne was seen and treated in our emergency department on 8/28/2022. She may return to work on 08/31/2022. If you have any questions or concerns, please don't hesitate to call.       Vahe Soliz MD

## 2022-08-28 NOTE — Clinical Note
Ryanne Carmona was seen and treated in our emergency department on 8/28/2022. She may return to work on 08/31/2022. If you have any questions or concerns, please don't hesitate to call.       Young Saucedo MD

## 2022-08-28 NOTE — ED TRIAGE NOTES
Pt states that she has increase in cough and SOB with no chest pain. Pt is also being treated for blood in urine. Pt had covid in the beginning of the month and has not gotten better. Pt has loop recorder in place for afib diagnostic.

## 2022-08-28 NOTE — DISCHARGE INSTRUCTIONS
Test today showed blood work that was reassuring. The CT scan showed an early infiltrate versus atelectasis in the right lower lobe of your lung. Since you have been feeling so poorly for a month after discussion of the risk/benefits of antibiotics we decided to treat you with antibiotics as well as prescribed you medication for the cough. Codeine is a narcotic, should be used mainly at night to help you sleep. Do not drink or drive when you are taking this medication. The Tessalon cough Perles can be taken anytime of day. It is also important to use the incentive spirometer as much as possible, this will help with the atelectasis. Follow-up with your primary care in the next 1-2 weeks as needed. Return to emergency department for any new or worsening symptoms or concerns.

## 2022-08-29 LAB
EKG ATRIAL RATE: 78 BPM
EKG DIAGNOSIS: NORMAL
EKG P AXIS: 45 DEGREES
EKG P-R INTERVAL: 170 MS
EKG Q-T INTERVAL: 384 MS
EKG QRS DURATION: 96 MS
EKG QTC CALCULATION (BAZETT): 437 MS
EKG R AXIS: -27 DEGREES
EKG T AXIS: 14 DEGREES
EKG VENTRICULAR RATE: 78 BPM

## 2022-08-29 PROCEDURE — 93010 ELECTROCARDIOGRAM REPORT: CPT | Performed by: INTERNAL MEDICINE

## 2022-09-06 ENCOUNTER — OFFICE VISIT (OUTPATIENT)
Dept: FAMILY MEDICINE CLINIC | Age: 52
End: 2022-09-06
Payer: COMMERCIAL

## 2022-09-06 VITALS
SYSTOLIC BLOOD PRESSURE: 134 MMHG | WEIGHT: 293 LBS | OXYGEN SATURATION: 95 % | HEART RATE: 81 BPM | BODY MASS INDEX: 43.62 KG/M2 | RESPIRATION RATE: 16 BRPM | DIASTOLIC BLOOD PRESSURE: 88 MMHG

## 2022-09-06 DIAGNOSIS — Z12.31 ENCOUNTER FOR SCREENING MAMMOGRAM FOR MALIGNANT NEOPLASM OF BREAST: ICD-10-CM

## 2022-09-06 DIAGNOSIS — Z00.00 ROUTINE GENERAL MEDICAL EXAMINATION AT A HEALTH CARE FACILITY: Primary | ICD-10-CM

## 2022-09-06 DIAGNOSIS — E78.2 MIXED HYPERLIPIDEMIA: ICD-10-CM

## 2022-09-06 DIAGNOSIS — Z23 NEEDS FLU SHOT: ICD-10-CM

## 2022-09-06 DIAGNOSIS — Z12.11 SCREEN FOR COLON CANCER: ICD-10-CM

## 2022-09-06 DIAGNOSIS — E88.81 METABOLIC SYNDROME: ICD-10-CM

## 2022-09-06 LAB
A/G RATIO: 1.6 (ref 1.1–2.2)
ALBUMIN SERPL-MCNC: 4.3 G/DL (ref 3.4–5)
ALP BLD-CCNC: 71 U/L (ref 40–129)
ALT SERPL-CCNC: 19 U/L (ref 10–40)
ANION GAP SERPL CALCULATED.3IONS-SCNC: 15 MMOL/L (ref 3–16)
AST SERPL-CCNC: 19 U/L (ref 15–37)
BILIRUB SERPL-MCNC: 1.4 MG/DL (ref 0–1)
BUN BLDV-MCNC: 12 MG/DL (ref 7–20)
CALCIUM SERPL-MCNC: 9.4 MG/DL (ref 8.3–10.6)
CHLORIDE BLD-SCNC: 106 MMOL/L (ref 99–110)
CO2: 22 MMOL/L (ref 21–32)
CREAT SERPL-MCNC: 0.7 MG/DL (ref 0.6–1.1)
GFR AFRICAN AMERICAN: >60
GFR NON-AFRICAN AMERICAN: >60
GLUCOSE BLD-MCNC: 104 MG/DL (ref 70–99)
POTASSIUM SERPL-SCNC: 3.9 MMOL/L (ref 3.5–5.1)
SODIUM BLD-SCNC: 143 MMOL/L (ref 136–145)
TOTAL PROTEIN: 7 G/DL (ref 6.4–8.2)

## 2022-09-06 PROCEDURE — 90674 CCIIV4 VAC NO PRSV 0.5 ML IM: CPT | Performed by: FAMILY MEDICINE

## 2022-09-06 PROCEDURE — 90471 IMMUNIZATION ADMIN: CPT | Performed by: FAMILY MEDICINE

## 2022-09-06 PROCEDURE — 99396 PREV VISIT EST AGE 40-64: CPT | Performed by: FAMILY MEDICINE

## 2022-09-06 PROCEDURE — 36415 COLL VENOUS BLD VENIPUNCTURE: CPT | Performed by: FAMILY MEDICINE

## 2022-09-06 ASSESSMENT — PATIENT HEALTH QUESTIONNAIRE - PHQ9
SUM OF ALL RESPONSES TO PHQ QUESTIONS 1-9: 2
SUM OF ALL RESPONSES TO PHQ9 QUESTIONS 1 & 2: 2
SUM OF ALL RESPONSES TO PHQ QUESTIONS 1-9: 2
SUM OF ALL RESPONSES TO PHQ QUESTIONS 1-9: 2
2. FEELING DOWN, DEPRESSED OR HOPELESS: 1
1. LITTLE INTEREST OR PLEASURE IN DOING THINGS: 1
SUM OF ALL RESPONSES TO PHQ QUESTIONS 1-9: 2

## 2022-09-06 NOTE — PROGRESS NOTES
2022    Sai Jalloh (:  1970) is a 47 y.o. female, here for evaluation of the following chief complaint(s): Annual Exam (Form physical)      ASSESSMENT/PLAN:     Diagnosis Orders   1. Routine general medical examination at a health care facility        2. Encounter for screening mammogram for malignant neoplasm of breast      mammo reviewed w pt      3. Screen for colon cancer      states completed 2020 next 7 years      4. Mixed hyperlipidemia  Comprehensive Metabolic Panel    cmp on statin cont      5. Metabolic syndrome  Comprehensive Metabolic Panel    Hemoglobin A1C    a1c today; LCIF advised      6. Needs flu shot  Influenza, FLUCELVAX, (age 10 mo+), IM, PF, 0.5 mL          Return in about 6 months (around 3/6/2023) for Hyperlipidemia, Metabolic syndrome. An electronic signature was used to authenticate this note.     SUBJECTIVE/OBJECTIVE:  (NOTE : prior results listed below reviewed at this visit to assist in medical decision making.)    HPI / ROS    # Preventive and other issues  # screen breast cancer - screening status discussed with patient; reviewed today prior mammo dated  ACR 1 next 1 year    # screen cervical cancer - screening status discussed with patient    # screen colon cancer - screening status discussed with patient; reviewed today prior testing colonoscopy 2020 told 7 years    # Hyperlipidemia on statin amaya this no myalgias or weakness LIPIDS UTD  Lab Results   Component Value Date    LDLCALC 104 (H) 2022    LDLDIRECT 122 (H) 10/10/2019      Lab Results   Component Value Date    ALT 16 2022    AST 20 2022    ALKPHOS 69 2022    BILITOT 1.8 (H)         # Metabolic Syndrome - check A1C today and reviewed need for lower carb dieting  Lab Results   Component Value Date    LABA1C 5.8 2022     Lab Results   Component Value Date    .0 2022               Wt Readings from Last 3 Encounters:   22 (!) 304 lb (137.9 kg) 08/28/22 (!) 305 lb 8.9 oz (138.6 kg)   05/10/22 297 lb (134.7 kg)       BP Readings from Last 3 Encounters:   09/06/22 134/88   08/28/22 125/86   05/10/22 (!) 144/84       PHYSICAL EXAM  Vitals:    09/06/22 0918   BP: 134/88   Site: Left Upper Arm   Position: Sitting   Cuff Size: Large Adult   Pulse: 81   Resp: 16   SpO2: 95%   Weight: (!) 304 lb (137.9 kg)     A&o  Neck no TMG no bruit  Car reg no MGR  Lungs cta  Ext no edema  Skin no jaundice  Eyes anicteric

## 2022-09-07 LAB
ESTIMATED AVERAGE GLUCOSE: 114 MG/DL
HBA1C MFR BLD: 5.6 %

## 2022-09-08 ENCOUNTER — NURSE ONLY (OUTPATIENT)
Dept: CARDIOLOGY CLINIC | Age: 52
End: 2022-09-08
Payer: COMMERCIAL

## 2022-09-08 DIAGNOSIS — Z86.73 HISTORY OF TIA (TRANSIENT ISCHEMIC ATTACK): Primary | ICD-10-CM

## 2022-09-08 DIAGNOSIS — Z45.09 ENCOUNTER FOR LOOP RECORDER CHECK: ICD-10-CM

## 2022-09-08 PROCEDURE — G2066 INTER DEVC REMOTE 30D: HCPCS | Performed by: INTERNAL MEDICINE

## 2022-09-08 PROCEDURE — 93298 REM INTERROG DEV EVAL SCRMS: CPT | Performed by: INTERNAL MEDICINE

## 2022-09-08 NOTE — PROGRESS NOTES
ILR for CVA. We received a remote transmission from patient's monitor at home. Remote Linq report shows no recorded arrhythmias/ECGs. Presenting ECG demonstrates noise. EP physician to review. We will continue to monitor remotely.      (End of 31-day monitoring period 9/8/22)

## 2022-09-26 ENCOUNTER — TELEPHONE (OUTPATIENT)
Dept: FAMILY MEDICINE CLINIC | Age: 52
End: 2022-09-26

## 2022-09-26 DIAGNOSIS — J01.90 ACUTE SINUSITIS, RECURRENCE NOT SPECIFIED, UNSPECIFIED LOCATION: Primary | ICD-10-CM

## 2022-09-26 RX ORDER — PREDNISONE 1 MG/1
5 TABLET ORAL 2 TIMES DAILY
Qty: 10 TABLET | Refills: 0 | Status: SHIPPED | OUTPATIENT
Start: 2022-09-26 | End: 2022-10-01

## 2022-09-26 RX ORDER — AZITHROMYCIN 250 MG/1
TABLET, FILM COATED ORAL
Qty: 1 PACKET | Refills: 0 | Status: SHIPPED | OUTPATIENT
Start: 2022-09-26 | End: 2022-10-01

## 2022-09-26 NOTE — TELEPHONE ENCOUNTER
Patient called in because she states that she has a sinus infection that has been going on for over a week and wants to know if she could get a antibiotic prescribed. She also has cough and congestion.     Please advise  [phone]

## 2022-09-29 ENCOUNTER — TELEPHONE (OUTPATIENT)
Dept: FAMILY MEDICINE CLINIC | Age: 52
End: 2022-09-29

## 2022-09-29 NOTE — TELEPHONE ENCOUNTER
Patient called in wanting to know if she could get some samples of ozempic. Also she ants to know if you could recommend a over the counter cough syrup. States that she still has the Covid cough.     Please advise  [phone]

## 2022-09-30 ENCOUNTER — TELEPHONE (OUTPATIENT)
Dept: FAMILY MEDICINE CLINIC | Age: 52
End: 2022-09-30

## 2022-09-30 NOTE — TELEPHONE ENCOUNTER
I would recommend that she be evaluated in urgent this evening--- we do not have providers in office for appts right now.

## 2022-09-30 NOTE — TELEPHONE ENCOUNTER
Pt called stating that she still is not feeling well and wants to know what else she can do.  Would like a call back please   Please advise  [phone]

## 2022-10-01 ENCOUNTER — APPOINTMENT (OUTPATIENT)
Dept: GENERAL RADIOLOGY | Age: 52
End: 2022-10-01
Payer: COMMERCIAL

## 2022-10-01 ENCOUNTER — HOSPITAL ENCOUNTER (EMERGENCY)
Age: 52
Discharge: HOME | End: 2022-10-01
Attending: EMERGENCY MEDICINE
Payer: COMMERCIAL

## 2022-10-01 VITALS
BODY MASS INDEX: 41.95 KG/M2 | HEART RATE: 68 BPM | SYSTOLIC BLOOD PRESSURE: 130 MMHG | HEIGHT: 70 IN | OXYGEN SATURATION: 96 % | TEMPERATURE: 98.2 F | DIASTOLIC BLOOD PRESSURE: 80 MMHG | RESPIRATION RATE: 17 BRPM | WEIGHT: 293 LBS

## 2022-10-01 DIAGNOSIS — R05.1 ACUTE COUGH: ICD-10-CM

## 2022-10-01 DIAGNOSIS — J40 BRONCHITIS: Primary | ICD-10-CM

## 2022-10-01 LAB
ANION GAP SERPL CALCULATED.3IONS-SCNC: 8 MMOL/L (ref 3–16)
BACTERIA: ABNORMAL /HPF
BASOPHILS ABSOLUTE: 0.1 K/UL (ref 0–0.2)
BASOPHILS RELATIVE PERCENT: 0.5 %
BILIRUBIN URINE: NEGATIVE
BLOOD, URINE: ABNORMAL
BUN BLDV-MCNC: 14 MG/DL (ref 7–20)
CALCIUM SERPL-MCNC: 9.7 MG/DL (ref 8.3–10.6)
CHLORIDE BLD-SCNC: 104 MMOL/L (ref 99–110)
CLARITY: ABNORMAL
CO2: 27 MMOL/L (ref 21–32)
COLOR: YELLOW
CREAT SERPL-MCNC: 0.7 MG/DL (ref 0.6–1.1)
D DIMER: 0.42 UG/ML FEU (ref 0–0.6)
EOSINOPHILS ABSOLUTE: 0.2 K/UL (ref 0–0.6)
EOSINOPHILS RELATIVE PERCENT: 2 %
EPITHELIAL CELLS, UA: ABNORMAL /HPF (ref 0–5)
GFR AFRICAN AMERICAN: >60
GFR NON-AFRICAN AMERICAN: >60
GLUCOSE BLD-MCNC: 100 MG/DL (ref 70–99)
GLUCOSE URINE: NEGATIVE MG/DL
HCT VFR BLD CALC: 42.8 % (ref 36–48)
HEMOGLOBIN: 13.6 G/DL (ref 12–16)
IMMATURE GRANULOCYTES #: 0 K/UL (ref 0–0.2)
IMMATURE GRANULOCYTES %: 0.2 %
KETONES, URINE: NEGATIVE MG/DL
LEUKOCYTE ESTERASE, URINE: NEGATIVE
LYMPHOCYTES ABSOLUTE: 2.8 K/UL (ref 1–5.1)
LYMPHOCYTES RELATIVE PERCENT: 29.7 %
MCH RBC QN AUTO: 28.5 PG (ref 26–34)
MCHC RBC AUTO-ENTMCNC: 31.8 G/DL (ref 32–36.4)
MCV RBC AUTO: 89.5 FL (ref 80–100)
MICROSCOPIC EXAMINATION: YES
MONOCYTES ABSOLUTE: 0.6 K/UL (ref 0–1.3)
MONOCYTES RELATIVE PERCENT: 6.5 %
MUCUS: ABNORMAL /LPF
NEUTROPHILS ABSOLUTE: 5.8 K/UL (ref 1.7–7.7)
NEUTROPHILS RELATIVE PERCENT: 61.1 %
NITRITE, URINE: NEGATIVE
PDW BLD-RTO: 13 % (ref 12.4–15.4)
PH UA: 5.5 (ref 5–8)
PLATELET # BLD: 320 K/UL (ref 135–450)
PMV BLD AUTO: 10.2 FL (ref 5–10.5)
POTASSIUM SERPL-SCNC: 3.8 MMOL/L (ref 3.5–5.1)
PROTEIN UA: NEGATIVE MG/DL
RAPID INFLUENZA  B AGN: NEGATIVE
RAPID INFLUENZA A AGN: NEGATIVE
RBC # BLD: 4.78 M/UL (ref 4–5.2)
RBC UA: ABNORMAL /HPF (ref 0–4)
SODIUM BLD-SCNC: 139 MMOL/L (ref 136–145)
SPECIFIC GRAVITY UA: >=1.03 (ref 1–1.03)
URINE REFLEX TO CULTURE: ABNORMAL
URINE TYPE: ABNORMAL
UROBILINOGEN, URINE: 0.2 E.U./DL
WBC # BLD: 9.4 K/UL (ref 4–11)
WBC UA: ABNORMAL /HPF (ref 0–5)

## 2022-10-01 PROCEDURE — 85379 FIBRIN DEGRADATION QUANT: CPT

## 2022-10-01 PROCEDURE — 71046 X-RAY EXAM CHEST 2 VIEWS: CPT

## 2022-10-01 PROCEDURE — 85025 COMPLETE CBC W/AUTO DIFF WBC: CPT

## 2022-10-01 PROCEDURE — 87804 INFLUENZA ASSAY W/OPTIC: CPT

## 2022-10-01 PROCEDURE — 80048 BASIC METABOLIC PNL TOTAL CA: CPT

## 2022-10-01 PROCEDURE — 99284 EMERGENCY DEPT VISIT MOD MDM: CPT

## 2022-10-01 PROCEDURE — 36415 COLL VENOUS BLD VENIPUNCTURE: CPT

## 2022-10-01 PROCEDURE — 81001 URINALYSIS AUTO W/SCOPE: CPT

## 2022-10-01 RX ORDER — ALBUTEROL SULFATE 90 UG/1
2 AEROSOL, METERED RESPIRATORY (INHALATION) EVERY 6 HOURS PRN
Qty: 18 G | Refills: 3 | Status: SHIPPED | OUTPATIENT
Start: 2022-10-01

## 2022-10-01 ASSESSMENT — ENCOUNTER SYMPTOMS
EYE REDNESS: 0
SHORTNESS OF BREATH: 1
ABDOMINAL PAIN: 0
COUGH: 1
RHINORRHEA: 0
SORE THROAT: 0

## 2022-10-01 ASSESSMENT — PAIN - FUNCTIONAL ASSESSMENT
PAIN_FUNCTIONAL_ASSESSMENT: NONE - DENIES PAIN
PAIN_FUNCTIONAL_ASSESSMENT: NONE - DENIES PAIN

## 2022-10-01 NOTE — ED PROVIDER NOTES
[facility]  eMERGENCY dEPARTMENT eNCOUnter      Pt Name: Salbador Amaro  MRN: [id number]  [place] 1970  Date of evaluation: 10/1/2022  Provider: Kimmie Beltre MD    CHIEF COMPLAINT       Chief Complaint   Patient presents with    Cough     Pt states she had covid in august and has been sick every since. Pt states she just finished z-pack yesterday. Pt states she has had cough and now has had congestion x 2 weeks. Pt states she is just not getting better. HISTORY OF PRESENT ILLNESS   (Location/Symptom, Timing/Onset,Context/Setting, Quality, Duration, Modifying Factors, Severity)  Note limiting factors. Salbador Amaro is a 47 y.o. female who presents to the emergency department for cough, dyspnea, and some urinary incontinence. She states she was diagnosed with COVID in early August.  She was placed on Paxil COVID. She did recover from [place] and initially felt worse and was seen towards the end of August and at that time she was found to have a right lower lobe pulmonary infiltrate and was treated with doxycycline. She states she completed her antibiotics and has another round of antibiotics of azithromycin from her primary care provider. Despite that she continues to have some cough and shortness of breath. She also states when she coughs she has some urinary incontinence. She states she has had that before and had pelvic floor therapy and got better. She states she is going to try to go through pelvic floor therapy again to see if that will improve her incontinence because she would like to avoid surgery. She is here today because she continues to have cough, dyspnea, fatigue and malaise. HPI    NursingNotes were reviewed. REVIEW OF SYSTEMS    (2-9 systems for level 4, 10 or more for level 5)     Review of Systems   Constitutional:  Positive for fatigue. Negative for fever. HENT:  Negative for rhinorrhea and sore throat.     Eyes:  Negative for redness. Respiratory:  Positive for cough and shortness of breath. Cardiovascular:  Negative for chest pain. Gastrointestinal:  Negative for abdominal pain. Genitourinary:  Negative for dysuria, flank pain and frequency. Skin:  Negative for rash. Neurological:  Negative for headaches. Hematological:  Negative for adenopathy. Psychiatric/Behavioral:  Negative for confusion. Except as noted above the remainder of the review of systems was reviewed and negative.        PAST MEDICAL HISTORY     Past Medical History:   Diagnosis Date    Arthritis     Cancer ([facility] [address]     basal-skin    Depression     Hyperlipidemia          SURGICALHISTORY       Past Surgical History:   Procedure Laterality Date     SECTION      x1    CHOLECYSTECTOMY      COLONOSCOPY  2020    Dr. Blanca Arellano    COLONOSCOPY N/A 2020    COLONOSCOPY performed by Karen Wesley MD at [facility]      bilateral    [address] [facility]       Discharge Medication List as of 10/1/2022  3:16 PM        CONTINUE these medications which have NOT CHANGED    Details   pantoprazole (PROTONIX) 40 MG tablet Take 1 tablet by mouth every morning (before breakfast), Disp-30 tablet, R-0Normal      Semaglutide,0.25 or 0.5MG/DOS, (OZEMPIC, 0.25 OR 0.5 MG/DOSE,) 2 MG/1.5ML SOPN Inject 0.5 mg into the skin once a week Sample from [address] [place] [address] Court [facility]      atorvastatin (LIPITOR) 40 MG tablet Take 1 tablet by mouth nightly, Disp-90 tablet, R-3Normal      melatonin 3 MG TABS tablet Take 3 mg by mouth nightly as needed (sleep) Historical Med      sertraline (ZOLOFT) 50 MG tablet TAKE 1 TABLET BY MOUTH IN  THE EVENING, Disp-90 tablet, R-3Requesting 1 year supplyNormal      Cyanocobalamin (B-12 PO) Take 1,000 Units by mouth daily Historical Med      Ascorbic Acid (VITAMIN C PO) Take 500 mg by mouth daily Historical Med      VITAMIN D PO Take 1,000 Units by mouth daily Historical Med      aspirin 81 MG chewable tablet Take 81 mg by mouth dailyHistorical Med             ALLERGIES     Patient has no known allergies. FAMILY HISTORY       Family History   Problem Relation Age of Onset    Breast Cancer Mother 76          SOCIAL HISTORY       Social History     Socioeconomic History    Marital status:      Spouse name: None    Number of children: None    Years of education: None    Highest education level: None   Tobacco Use    Smoking status: Never    Smokeless tobacco: Never   Vaping Use    Vaping Use: Never used   Substance and Sexual Activity    Alcohol use: Yes     Comment: socially    Drug use: Never    Sexual activity: Yes     Partners: Male     Social Determinants of Health     Financial Resource Strain: Low Risk     Difficulty of Paying Living Expenses: Not hard at all   Food Insecurity: No Food Insecurity    Worried About [employer] in the Last Year: Never true    [employer] in the Last Year: Never true       SCREENINGS    Jassi Coma Scale  Eye Opening: Spontaneous  Best Verbal Response: Oriented  Best Motor Response: Obeys commands  [place] Coma Scale Score: 15        PHYSICAL EXAM    (up to 7 for level 4, 8 or more for level 5)     ED Triage Vitals   BP Temp Temp src Pulse Resp SpO2 Height Weight   -- -- -- -- -- -- -- --       Physical Exam  Vitals and nursing note reviewed. Constitutional:       Appearance: She is well-developed. She is not diaphoretic. HENT:      Head: Normocephalic and atraumatic. Nose: Nose normal.      Mouth/Throat:      Mouth: Mucous membranes are moist.   Eyes:      General:         Right eye: No discharge. Left eye: No discharge. Conjunctiva/sclera: Conjunctivae normal.   Cardiovascular:      Rate and Rhythm: Normal rate. Heart sounds: No murmur heard. Pulmonary:      Effort: Pulmonary effort is normal. No respiratory distress. Breath sounds: No wheezing, rhonchi or rales.    Abdominal: Palpations: Abdomen is soft. Tenderness: There is no abdominal tenderness. There is no guarding or rebound. Musculoskeletal:         General: Normal range of motion. Cervical back: Neck supple. Skin:     General: Skin is warm and dry. Capillary Refill: Capillary refill takes less than 2 seconds. Neurological:      Mental Status: She is alert and oriented to person, place, and time. Psychiatric:         Behavior: Behavior normal.       DIAGNOSTIC RESULTS     EKG: All EKG's are interpreted by the Emergency Department Physician who either signs or Co-signsthis chart in the absence of a cardiologist.        RADIOLOGY:   Elías Elvis such as CT, Ultrasound and MRI are read by the radiologist. Plain radiographic images are visualized and preliminarily interpreted by the emergency physician with the below findings:        Interpretation per the Radiologist below, if available at the time ofthis note:    XR CHEST (2 VW)   Final Result   No acute cardiopulmonary disease. ED BEDSIDE ULTRASOUND:   Performed by ED Physician - none    LABS:  Labs Reviewed   CBC WITH AUTO DIFFERENTIAL - Abnormal; Notable for the following components:       Result Value    MCHC 31.8 (*)     All other components within normal limits   BASIC METABOLIC PANEL - Abnormal; Notable for the following components:    Glucose 100 (*)     All other components within normal limits   URINALYSIS WITH REFLEX TO CULTURE - Abnormal; Notable for the following components:    Blood, Urine TRACE-LYSED (*)     All other components within normal limits   MICROSCOPIC URINALYSIS - Abnormal; Notable for the following components:    Mucus, UA 2+ (*)     Epithelial Cells, UA 6-10 (*)     Bacteria, UA Rare (*)     All other components within normal limits   RAPID INFLUENZA A/B ANTIGENS   D-DIMER, QUANTITATIVE       All other labs were within normal range or not returned as of this dictation.     EMERGENCY DEPARTMENT COURSE and DIFFERENTIAL DIAGNOSIS/MDM:   Vitals:    Vitals:    10/01/22 1352 10/01/22 1524   BP: 132/84 130/80   Pulse: 71 68   Resp: 19 17   Temp: 98.2 °F (36.8 °C)    TempSrc: Oral    SpO2: 96% 96%   Weight: (!) 306 lb (138.8 kg)    Height: 5' 10\" (1.778 m)            MDM    DDX: Pneumonia, influenza, pleurisy, bronchitis, PE, other    Work-up of the patient reveals no acute cardiopulmonary abnormality on the patient's chest x-ray. Urinalysis is without obvious infection. The blood work is unremarkable. Rapid flu is negative. I have a low pretest probability for PE and the D-dimer is negative so I do not suspect nor do I feel PE needs to be worked up further. For now we will treat the patient for bronchitis. Is this patient to be included in the SEP-1 Core Measure? No   Exclusion criteria - the patient is NOT to be included for SEP-1 Core Measure due to: Infection is not suspected     CRITICAL CARE TIME   I personally saw the patient and independently provided 15 minutes of non-concurrent critical care out of the total shared critical care time provided. There was a high probability of clinically significant/life threatening deterioration in the patient's condition which required my urgent intervention. CONSULTS:  None    PROCEDURES:  Unless otherwise noted below, none     Procedures    FINAL IMPRESSION      1. Bronchitis    2.  Acute cough          DISPOSITION/PLAN   DISPOSITION Decision To Discharge 10/01/2022 02:49:40 PM      PATIENT REFERRED TO:  Jarett Vidal MD  [address] [place] [address]  [phone]    Schedule an appointment as soon as possible for a visit in 1 week      DISCHARGE MEDICATIONS:  Discharge Medication List as of 10/1/2022  3:16 PM        START taking these medications    Details   albuterol sulfate HFA (PROVENTIL HFA) 108 (90 Base) MCG/ACT inhaler Inhale 2 puffs into the lungs every 6 hours as needed for Wheezing, Disp-18 g, R-3Normal                (Please note that portions of this note were completed with a voice recognition program.Efforts were made to edit the dictations but occasionally words are mis-transcribed.)    Elías Townsend MD (electronically signed)  Attending Emergency Physician          Elías Townsend MD  10/01/22 3877

## 2022-10-05 ENCOUNTER — TELEPHONE (OUTPATIENT)
Dept: FAMILY MEDICINE CLINIC | Age: 52
End: 2022-10-05

## 2022-10-06 NOTE — TELEPHONE ENCOUNTER
Pt's  picked up samples yesterday.  I got the samples from [facility] as refrigerator was locked (3 sample boxes were provided)

## 2022-10-13 ENCOUNTER — NURSE ONLY (OUTPATIENT)
Dept: CARDIOLOGY CLINIC | Age: 52
End: 2022-10-13
Payer: COMMERCIAL

## 2022-10-13 DIAGNOSIS — Z95.818 STATUS POST PLACEMENT OF IMPLANTABLE LOOP RECORDER: ICD-10-CM

## 2022-10-13 DIAGNOSIS — Z86.73 HISTORY OF TIA (TRANSIENT ISCHEMIC ATTACK): Primary | ICD-10-CM

## 2022-10-13 PROCEDURE — 93298 REM INTERROG DEV EVAL SCRMS: CPT | Performed by: INTERNAL MEDICINE

## 2022-10-13 PROCEDURE — 93295 DEV INTERROG REMOTE 1/2/MLT: CPT | Performed by: INTERNAL MEDICINE

## 2022-10-13 NOTE — PROGRESS NOTES
ILR for CVA. We received a remote transmission from patient's monitor at home. Remote Linq report shows no recorded arrhythmias/ECGs. PVC burden 0.1%. EP physician to review. We will continue to monitor remotely.      (End of 31-day monitoring period 10/13/22)

## 2022-11-09 RX ORDER — ATORVASTATIN CALCIUM 40 MG/1
TABLET, FILM COATED ORAL
Qty: 90 TABLET | Refills: 3 | Status: SHIPPED | OUTPATIENT
Start: 2022-11-09

## 2022-11-09 NOTE — TELEPHONE ENCOUNTER
Medication:   Requested Prescriptions     Pending Prescriptions Disp Refills    atorvastatin (LIPITOR) 40 MG tablet [Pharmacy Med Name: Atorvastatin Calcium 40 MG Oral Tablet] 90 tablet 3     Sig: TAKE 1 TABLET BY MOUTH AT  NIGHT       Last Filled:      Patient Phone Number: [phone] (home)     Last appt: 9/6/2022   Next appt: 3/3/2023

## 2022-11-17 ENCOUNTER — NURSE ONLY (OUTPATIENT)
Dept: CARDIOLOGY CLINIC | Age: 52
End: 2022-11-17
Payer: COMMERCIAL

## 2022-11-17 DIAGNOSIS — Z95.818 STATUS POST PLACEMENT OF IMPLANTABLE LOOP RECORDER: Primary | ICD-10-CM

## 2022-11-17 DIAGNOSIS — Z86.73 HISTORY OF TIA (TRANSIENT ISCHEMIC ATTACK): ICD-10-CM

## 2022-11-17 PROCEDURE — 93298 REM INTERROG DEV EVAL SCRMS: CPT | Performed by: INTERNAL MEDICINE

## 2022-11-17 PROCEDURE — G2066 INTER DEVC REMOTE 30D: HCPCS | Performed by: INTERNAL MEDICINE

## 2022-11-18 NOTE — PROGRESS NOTES
ILR for CVA. We received a remote transmission from patient's monitor at home. Remote Linq report shows no recorded arrhythmias/ECGs. PVC burden 0.1%. EP physician to review. We will continue to monitor remotely.      (End of 31-day monitoring period 11/17/22)

## 2022-11-21 ENCOUNTER — TELEPHONE (OUTPATIENT)
Dept: CARDIOLOGY CLINIC | Age: 52
End: 2022-11-21

## 2022-11-21 NOTE — LETTER
[facility]  Phone: [phone]  Fax: [phone]    Iris Turner MD        November 22, 2022     Patient: Kelly Dallas   YOB: 1970   Date of Visit: 11/21/2022       To Whom It May Concern: It is my medical opinion that Bishnu Robles is okay to proceed with the procedure. Please hold Aspirin 7 days prior to the procedure and resume as soon as safely possible. If you have any questions or concerns, please don't hesitate to call.     Sincerely,        Iris Turner MD [place], [place]

## 2022-11-21 NOTE — TELEPHONE ENCOUNTER
Dr. Phoenix Narayan, please review and advise for a pre-operative risk assessment and requesting to hold the ASA for 7 days prior. Patient was last seen 4/4/22 and s/p ILR 5/10/22.

## 2022-11-21 NOTE — TELEPHONE ENCOUNTER
CARDIAC CLEARANCE REQUEST    What type of procedure are you having: CYSTOSCOPY TRANS VAGINAL TAPE    Are you taking any blood thinners: Aspirin - Needs to hold a week prior     When is your procedure scheduled for: 12/29/22    What physician is performing your procedure: Dr. Saw Wyatt     Phone Number: [phone]    Fax number to send the letter: [phone]  Attention Kirti Duggan

## 2022-11-22 NOTE — TELEPHONE ENCOUNTER
Please create letter with Dr. Terrence Reddy recommendations and fax to the requesting provider. Please notify patient as well.

## 2022-11-25 ENCOUNTER — TELEPHONE (OUTPATIENT)
Dept: CARDIOLOGY CLINIC | Age: 52
End: 2022-11-25

## 2022-11-25 NOTE — TELEPHONE ENCOUNTER
CARDIAC CLEARANCE     What type of procedure are you having? Sling urethra     Which physician is performing your procedure? Dr. Ita Henry    When is your procedure scheduled for? 12/29/2022    Where are you having this procedure? [facility]    Are you taking Blood Thinners? yes  If so what? Aspirin  (Name/dose/frequesncy)    Does the surgeon want you to stop your blood thinner?   Yes     If so for how long? 7 days prior     Phone Number and Contact Name for Physicians office:  [phone]    Fax number to send information:  [phone]

## 2022-12-19 ENCOUNTER — HOSPITAL ENCOUNTER (OUTPATIENT)
Dept: PREADMISSION TESTING | Age: 52
Discharge: HOME | End: 2022-12-23
Payer: COMMERCIAL

## 2022-12-19 DIAGNOSIS — Z01.818 ENCOUNTER FOR PREADMISSION TESTING: ICD-10-CM

## 2022-12-19 LAB
A/G RATIO: 1.5 (ref 1.1–2.2)
ABO/RH: NORMAL
ALBUMIN SERPL-MCNC: 4.3 G/DL (ref 3.4–5)
ALP BLD-CCNC: 76 U/L (ref 40–129)
ALT SERPL-CCNC: 18 U/L (ref 10–40)
ANION GAP SERPL CALCULATED.3IONS-SCNC: 8 MMOL/L (ref 3–16)
ANTIBODY SCREEN: NORMAL
APTT: 31.9 SEC (ref 23–34.3)
AST SERPL-CCNC: 17 U/L (ref 15–37)
BASOPHILS ABSOLUTE: 0.1 K/UL (ref 0–0.2)
BASOPHILS RELATIVE PERCENT: 0.8 %
BILIRUB SERPL-MCNC: 2.5 MG/DL (ref 0–1)
BUN BLDV-MCNC: 11 MG/DL (ref 7–20)
CALCIUM SERPL-MCNC: 9.1 MG/DL (ref 8.3–10.6)
CHLORIDE BLD-SCNC: 103 MMOL/L (ref 99–110)
CO2: 27 MMOL/L (ref 21–32)
CREAT SERPL-MCNC: 0.7 MG/DL (ref 0.6–1.1)
EOSINOPHILS ABSOLUTE: 0.2 K/UL (ref 0–0.6)
EOSINOPHILS RELATIVE PERCENT: 3 %
GFR SERPL CREATININE-BSD FRML MDRD: >60 ML/MIN/{1.73_M2}
GLUCOSE BLD-MCNC: 96 MG/DL (ref 70–99)
HCT VFR BLD CALC: 40.4 % (ref 36–48)
HEMOGLOBIN: 13.4 G/DL (ref 12–16)
INR BLD: 0.99 (ref 0.87–1.14)
LYMPHOCYTES ABSOLUTE: 1.8 K/UL (ref 1–5.1)
LYMPHOCYTES RELATIVE PERCENT: 27.3 %
MCH RBC QN AUTO: 28.7 PG (ref 26–34)
MCHC RBC AUTO-ENTMCNC: 33.2 G/DL (ref 31–36)
MCV RBC AUTO: 86.5 FL (ref 80–100)
MONOCYTES ABSOLUTE: 0.5 K/UL (ref 0–1.3)
MONOCYTES RELATIVE PERCENT: 7.9 %
NEUTROPHILS ABSOLUTE: 4.1 K/UL (ref 1.7–7.7)
NEUTROPHILS RELATIVE PERCENT: 61 %
PDW BLD-RTO: 13.9 % (ref 12.4–15.4)
PLATELET # BLD: 300 K/UL (ref 135–450)
PMV BLD AUTO: 9 FL (ref 5–10.5)
POTASSIUM SERPL-SCNC: 3.7 MMOL/L (ref 3.5–5.1)
PROTHROMBIN TIME: 13 SEC (ref 11.7–14.5)
RBC # BLD: 4.66 M/UL (ref 4–5.2)
SODIUM BLD-SCNC: 138 MMOL/L (ref 136–145)
TOTAL PROTEIN: 7.2 G/DL (ref 6.4–8.2)
WBC # BLD: 6.7 K/UL (ref 4–11)

## 2022-12-19 PROCEDURE — 86850 RBC ANTIBODY SCREEN: CPT

## 2022-12-19 PROCEDURE — 85730 THROMBOPLASTIN TIME PARTIAL: CPT

## 2022-12-19 PROCEDURE — 80053 COMPREHEN METABOLIC PANEL: CPT

## 2022-12-19 PROCEDURE — 86900 BLOOD TYPING SEROLOGIC ABO: CPT

## 2022-12-19 PROCEDURE — 85610 PROTHROMBIN TIME: CPT

## 2022-12-19 PROCEDURE — 85025 COMPLETE CBC W/AUTO DIFF WBC: CPT

## 2022-12-19 PROCEDURE — 86901 BLOOD TYPING SEROLOGIC RH(D): CPT

## 2022-12-19 NOTE — PROGRESS NOTES
Jojo Persaud  :  1970    DOS:  22    H&P will be done with Dr. Kamilah Sin on 22 #[phone]    UPDATE:22 @ 60 920 56 25: PRE-OP H&P IN Epic -PATIENT CLEARED FOR SURGERY

## 2022-12-19 NOTE — PROGRESS NOTES
[facility] time____________        Surgery time ____1355________    Take the following medications with a sip of water: Follow your MD/Surgeons pre-procedure instructions regarding your medications     Do not eat or drink anything after 12:00 midnight prior to your surgery. This includes water chewing gum, mints and ice chips. You may brush your teeth and gargle the morning of your surgery, but do not swallow the water     Please see your family doctor/pediatrician for a history and physical and/or concerning medications. Bring any test results/reports from your physicians office. If you are under the care of a heart doctor or specialist doctor, please be aware that you may be asked to them for clearance    You may be asked to stop blood thinners such as Coumadin, Plavix, Fragmin, Lovenox, etc., or any anti-inflammatories such as:  Aspirin, Ibuprofen, Advil, Naproxen prior to your surgery. We also ask that you stop any OTC medications such as fish oil, vitamin E, glucosamine, garlic, Multivitamins, COQ 10, etc.    We ask that you do not smoke 24 hours prior to surgery  We ask that you do not  drink any alcoholic beverages 24 hours prior to surgery     You must make arrangements for a responsible adult to take you home after your surgery. For your safety you will not be allowed to leave alone or drive yourself home. Your surgery will be cancelled if you do not have a ride home. Also for your safety, it is strongly suggested that someone stay with you the first 24 hours after your surgery. A parent or legal guardian must accompany a child scheduled for surgery and plan to stay at the hospital until the child is discharged. Please do not bring other children with you. For your comfort, please wear simple loose fitting clothing to the hospital.  Please do not bring valuables.     Do not wear any make-up or nail polish on your fingers or toes      For your safety, please do not wear any jewelry or body piercing's on the day of surgery. All jewelry must be removed. If you have dentures, they will be removed before going to operating room. For your convenience, we will provide you with a container. If you wear contact lenses or glasses, they will be removed, please bring a case for them. If you have a living will and a durable power of  for healthcare, please bring in a copy. As part of our patient safety program to minimize surgical site infections, we ask you to do the following:    Please notify your surgeon if you develop any illness between         now and the  day of your surgery. This includes a cough, cold, fever, sore throat, nausea,         or vomiting, and diarrhea, etc.   Please notify your surgeon if you experience dizziness, shortness         of breath or blurred vision between now and the time of your surgery. Do not shave your operative site 96 hours prior to surgery. For face and neck surgery, men may use an electric razor 48 hours   prior to surgery. You may shower the night before surgery or the morning of   your surgery with an antibacterial soap. You will need to bring a photo ID and insurance card    [facility] has an onsite pharmacy, would you like to utilize our pharmacy     If you will be staying overnight and use a C-pap machine, please bring   your C-pap to hospital     Our goal is to provide you with excellent care, therefore, visitors will be limited to two(2) in the room at a time so that we may focus on providing this care for you. Please contact pre-admission testing if you have any further questions. [facility] phone number:  [phone] PAT fax number:  [phone]  Please note these are generalized instructions for all surgical cases, you may be provided with more specific instructions according to your surgery.     C-Difficile admission screening and protocol:       * Admitted with diarrhea? [] YES    [x]  NO     *Prior history of C-Diff. In last 3 months? [] YES    [x]  NO     *Antibiotic use in the past 6-8 weeks? [x]  NO    []  YES                 If yes, which ANTIBIOTIC AND REASON______     *Prior hospitalization or nursing home in the last month? []  YES    [x]  NO        SAFETY FIRST. .call before you fall

## 2022-12-21 ENCOUNTER — OFFICE VISIT (OUTPATIENT)
Dept: FAMILY MEDICINE CLINIC | Age: 52
End: 2022-12-21
Payer: COMMERCIAL

## 2022-12-21 VITALS
DIASTOLIC BLOOD PRESSURE: 78 MMHG | HEART RATE: 76 BPM | BODY MASS INDEX: 43.05 KG/M2 | SYSTOLIC BLOOD PRESSURE: 118 MMHG | OXYGEN SATURATION: 97 % | WEIGHT: 293 LBS

## 2022-12-21 DIAGNOSIS — Z01.818 PREOP EXAMINATION: ICD-10-CM

## 2022-12-21 DIAGNOSIS — N39.3 STRESS INCONTINENCE: Primary | ICD-10-CM

## 2022-12-21 DIAGNOSIS — Z95.818 STATUS POST PLACEMENT OF IMPLANTABLE LOOP RECORDER: ICD-10-CM

## 2022-12-21 PROCEDURE — 99214 OFFICE O/P EST MOD 30 MIN: CPT | Performed by: FAMILY MEDICINE

## 2022-12-21 NOTE — H&P (VIEW-ONLY)
Preop PE at request of Dr. Claudia Hanley for urethral sling at [facility] on [address] [facility] [address] . Diagnosis Orders   1. Stress incontinence      OK for surgery; has rec'd cardiac cearance per Dr. Fernandez Sneed      2. Preop examination      as above      3. Status post placement of implantable loop recorder      x 2 years planned; asa stopped 1 week prior        @SIG@      ROS : No new complaints. Patient denies chest pain, shortness of breath, or fever.     PAST MEDICAL & SURGICAL HISTORY  Patient Active Problem List   Diagnosis    Hyperlipidemia    Depression    Gilbert's disease    Gastroesophageal reflux disease with esophagitis    History of TIA (transient ischemic attack)    Morbid obesity with BMI of 40.0-44.9, adult ([facility] [address]    Metabolic syndrome    Numbness    Left cervical radiculopathy    Pharyngeal abscess    Status post placement of implantable loop recorder     Past Surgical History:   Procedure Laterality Date     SECTION      x1    CHOLECYSTECTOMY      COLONOSCOPY  2020    Dr. Ryann Murray    COLONOSCOPY N/A 2020    COLONOSCOPY performed by Argelia Wolff MD at [facility] 49 Bilateral     INSERTABLE CARDIAC MONITOR      loop recorder    UTERINE FIBROID SURGERY      removed fibroid       CURRENT MEDS  Current Outpatient Medications   Medication Sig Dispense Refill    Cyanocobalamin (VITAMIN B-12 IJ) Inject 1,000 mcg as directed every 30 days      atorvastatin (LIPITOR) 40 MG tablet TAKE 1 TABLET BY MOUTH AT  NIGHT 90 tablet 3    albuterol sulfate HFA (PROVENTIL HFA) 108 (90 Base) MCG/ACT inhaler Inhale 2 puffs into the lungs every 6 hours as needed for Wheezing 18 g 3    pantoprazole (PROTONIX) 40 MG tablet Take 1 tablet by mouth every morning (before breakfast) 30 tablet 0    Semaglutide,0.25 or 0.5MG/DOS, (OZEMPIC, 0.25 OR 0.5 MG/DOSE,) 2 MG/1.5ML SOPN Inject 0.5 mg into the skin once a week Sample from PCP      melatonin 3 MG TABS tablet Take 3 mg by mouth nightly as needed (sleep)       sertraline (ZOLOFT) 50 MG tablet TAKE 1 TABLET BY MOUTH IN  THE EVENING 90 tablet 3    Ascorbic Acid (VITAMIN C PO) Take 500 mg by mouth daily       aspirin 81 MG chewable tablet Take 81 mg by mouth daily       No current facility-administered medications for this visit. ALLERGIES  No Known Allergies    Social History     Tobacco Use    Smoking status: Never    Smokeless tobacco: Never   Vaping Use    Vaping Use: Never used   Substance Use Topics    Alcohol use: Yes     Comment: socially    Drug use: Never        Family History   Problem Relation Age of Onset    Breast Cancer Mother 76    Diabetes Father     High Blood Pressure Father     Heart Disease Father     Other Father         CHF    Stroke Father         /78 (Site: Left Upper Arm, Position: Sitting, Cuff Size: Large Adult)   Pulse 76   Wt 300 lb (136.1 kg)   SpO2 97%   BMI 43.05 kg/m²   General - alert and oriented; speaking easily in complete sentences  Skin - no rash or jaundice  Neck - No lymphadenopathy. No thyromegaly. No bruit. Lungs - clear to ascultation  Heart - Regular rate and rhythm, No murmur. No gallop. No rub. Abdomen - Abdomen soft, non-tender.      Lab Results   Component Value Date     12/19/2022    K 3.7 12/19/2022     12/19/2022    CO2 27 12/19/2022    BUN 11 12/19/2022    CREATININE 0.7 12/19/2022    GLUCOSE 96 12/19/2022    CALCIUM 9.1 12/19/2022    PROT 7.2 12/19/2022    LABALBU 4.3 12/19/2022    BILITOT 2.5 (H) 12/19/2022    ALKPHOS 76 12/19/2022    AST 17 12/19/2022    ALT 18 12/19/2022    LABGLOM >60 12/19/2022    GFRAA >60 10/01/2022    AGRATIO 1.5 12/19/2022    GLOB 3.3 02/12/2021       Lab Results   Component Value Date    WBC 6.7 12/19/2022    HGB 13.4 12/19/2022    HCT 40.4 12/19/2022    MCV 86.5 12/19/2022     12/19/2022

## 2022-12-21 NOTE — PROGRESS NOTES
Preop PE at request of Dr. Holden Oliveros for urethral sling at [facility] on [address] [facility] [address] . Diagnosis Orders   1. Stress incontinence      OK for surgery; has rec'd cardiac cearance per Dr. Phoenix Narayan      2. Preop examination      as above      3. Status post placement of implantable loop recorder      x 2 years planned; asa stopped 1 week prior        @SIG@      ROS : No new complaints. Patient denies chest pain, shortness of breath, or fever.     PAST MEDICAL & SURGICAL HISTORY  Patient Active Problem List   Diagnosis    Hyperlipidemia    Depression    Gilbert's disease    Gastroesophageal reflux disease with esophagitis    History of TIA (transient ischemic attack)    Morbid obesity with BMI of 40.0-44.9, adult ([facility] [address]    Metabolic syndrome    Numbness    Left cervical radiculopathy    Pharyngeal abscess    Status post placement of implantable loop recorder     Past Surgical History:   Procedure Laterality Date     SECTION      x1    CHOLECYSTECTOMY      COLONOSCOPY  2020    Dr. Dea Durbin    COLONOSCOPY N/A 2020    COLONOSCOPY performed by Kady Johnson MD at [facility] 49 Bilateral     INSERTABLE CARDIAC MONITOR      loop recorder    UTERINE FIBROID SURGERY      removed fibroid       CURRENT MEDS  Current Outpatient Medications   Medication Sig Dispense Refill    Cyanocobalamin (VITAMIN B-12 IJ) Inject 1,000 mcg as directed every 30 days      atorvastatin (LIPITOR) 40 MG tablet TAKE 1 TABLET BY MOUTH AT  NIGHT 90 tablet 3    albuterol sulfate HFA (PROVENTIL HFA) 108 (90 Base) MCG/ACT inhaler Inhale 2 puffs into the lungs every 6 hours as needed for Wheezing 18 g 3    pantoprazole (PROTONIX) 40 MG tablet Take 1 tablet by mouth every morning (before breakfast) 30 tablet 0    Semaglutide,0.25 or 0.5MG/DOS, (OZEMPIC, 0.25 OR 0.5 MG/DOSE,) 2 MG/1.5ML SOPN Inject 0.5 mg into the skin once a week Sample from PCP      melatonin 3 MG TABS tablet Take 3 mg by mouth nightly as needed (sleep)       sertraline (ZOLOFT) 50 MG tablet TAKE 1 TABLET BY MOUTH IN  THE EVENING 90 tablet 3    Ascorbic Acid (VITAMIN C PO) Take 500 mg by mouth daily       aspirin 81 MG chewable tablet Take 81 mg by mouth daily       No current facility-administered medications for this visit. ALLERGIES  No Known Allergies    Social History     Tobacco Use    Smoking status: Never    Smokeless tobacco: Never   Vaping Use    Vaping Use: Never used   Substance Use Topics    Alcohol use: Yes     Comment: socially    Drug use: Never        Family History   Problem Relation Age of Onset    Breast Cancer Mother 76    Diabetes Father     High Blood Pressure Father     Heart Disease Father     Other Father         CHF    Stroke Father         /78 (Site: Left Upper Arm, Position: Sitting, Cuff Size: Large Adult)   Pulse 76   Wt 300 lb (136.1 kg)   SpO2 97%   BMI 43.05 kg/m²   General - alert and oriented; speaking easily in complete sentences  Skin - no rash or jaundice  Neck - No lymphadenopathy. No thyromegaly. No bruit. Lungs - clear to ascultation  Heart - Regular rate and rhythm, No murmur. No gallop. No rub. Abdomen - Abdomen soft, non-tender.      Lab Results   Component Value Date     12/19/2022    K 3.7 12/19/2022     12/19/2022    CO2 27 12/19/2022    BUN 11 12/19/2022    CREATININE 0.7 12/19/2022    GLUCOSE 96 12/19/2022    CALCIUM 9.1 12/19/2022    PROT 7.2 12/19/2022    LABALBU 4.3 12/19/2022    BILITOT 2.5 (H) 12/19/2022    ALKPHOS 76 12/19/2022    AST 17 12/19/2022    ALT 18 12/19/2022    LABGLOM >60 12/19/2022    GFRAA >60 10/01/2022    AGRATIO 1.5 12/19/2022    GLOB 3.3 02/12/2021       Lab Results   Component Value Date    WBC 6.7 12/19/2022    HGB 13.4 12/19/2022    HCT 40.4 12/19/2022    MCV 86.5 12/19/2022     12/19/2022

## 2022-12-28 ENCOUNTER — ANESTHESIA EVENT (OUTPATIENT)
Dept: OPERATING ROOM | Age: 52
End: 2022-12-28
Payer: COMMERCIAL

## 2022-12-29 ENCOUNTER — HOSPITAL ENCOUNTER (OUTPATIENT)
Age: 52
Setting detail: OUTPATIENT SURGERY
Discharge: HOME | End: 2022-12-29
Attending: UROLOGY | Admitting: UROLOGY
Payer: COMMERCIAL

## 2022-12-29 ENCOUNTER — ANESTHESIA (OUTPATIENT)
Dept: OPERATING ROOM | Age: 52
End: 2022-12-29
Payer: COMMERCIAL

## 2022-12-29 VITALS
WEIGHT: 293 LBS | TEMPERATURE: 97.6 F | OXYGEN SATURATION: 92 % | RESPIRATION RATE: 18 BRPM | DIASTOLIC BLOOD PRESSURE: 64 MMHG | HEART RATE: 69 BPM | HEIGHT: 70 IN | BODY MASS INDEX: 41.95 KG/M2 | SYSTOLIC BLOOD PRESSURE: 106 MMHG

## 2022-12-29 DIAGNOSIS — Z01.818 ENCOUNTER FOR PREADMISSION TESTING: Primary | ICD-10-CM

## 2022-12-29 DIAGNOSIS — G89.18 POST-OP PAIN: ICD-10-CM

## 2022-12-29 LAB
ABO/RH: NORMAL
ANTIBODY SCREEN: NORMAL

## 2022-12-29 PROCEDURE — C1771 REP DEV, URINARY, W/SLING: HCPCS | Performed by: UROLOGY

## 2022-12-29 PROCEDURE — 6360000002 HC RX W HCPCS: Performed by: UROLOGY

## 2022-12-29 PROCEDURE — 2709999900 HC NON-CHARGEABLE SUPPLY: Performed by: UROLOGY

## 2022-12-29 PROCEDURE — 7100000001 HC PACU RECOVERY - ADDTL 15 MIN: Performed by: UROLOGY

## 2022-12-29 PROCEDURE — 2580000003 HC RX 258: Performed by: UROLOGY

## 2022-12-29 PROCEDURE — 3600000004 HC SURGERY LEVEL 4 BASE: Performed by: UROLOGY

## 2022-12-29 PROCEDURE — 7100000011 HC PHASE II RECOVERY - ADDTL 15 MIN: Performed by: UROLOGY

## 2022-12-29 PROCEDURE — 86901 BLOOD TYPING SEROLOGIC RH(D): CPT

## 2022-12-29 PROCEDURE — A4217 STERILE WATER/SALINE, 500 ML: HCPCS | Performed by: UROLOGY

## 2022-12-29 PROCEDURE — 2500000003 HC RX 250 WO HCPCS

## 2022-12-29 PROCEDURE — 86900 BLOOD TYPING SEROLOGIC ABO: CPT

## 2022-12-29 PROCEDURE — 3600000014 HC SURGERY LEVEL 4 ADDTL 15MIN: Performed by: UROLOGY

## 2022-12-29 PROCEDURE — 2500000003 HC RX 250 WO HCPCS: Performed by: UROLOGY

## 2022-12-29 PROCEDURE — 3700000001 HC ADD 15 MINUTES (ANESTHESIA): Performed by: UROLOGY

## 2022-12-29 PROCEDURE — 7100000000 HC PACU RECOVERY - FIRST 15 MIN: Performed by: UROLOGY

## 2022-12-29 PROCEDURE — 2580000003 HC RX 258: Performed by: STUDENT IN AN ORGANIZED HEALTH CARE EDUCATION/TRAINING PROGRAM

## 2022-12-29 PROCEDURE — 6360000002 HC RX W HCPCS

## 2022-12-29 PROCEDURE — 3700000000 HC ANESTHESIA ATTENDED CARE: Performed by: UROLOGY

## 2022-12-29 PROCEDURE — 7100000010 HC PHASE II RECOVERY - FIRST 15 MIN: Performed by: UROLOGY

## 2022-12-29 PROCEDURE — 86850 RBC ANTIBODY SCREEN: CPT

## 2022-12-29 DEVICE — TRANSVAGINAL MID-URETHRAL SLING
Type: IMPLANTABLE DEVICE | Site: VAGINA | Status: FUNCTIONAL
Brand: ADVANTAGE FIT™ BLUE SYSTEM

## 2022-12-29 RX ORDER — SUCCINYLCHOLINE/SOD CL,ISO/PF 200MG/10ML
SYRINGE (ML) INTRAVENOUS PRN
Status: DISCONTINUED | OUTPATIENT
Start: 2022-12-29 | End: 2022-12-29 | Stop reason: SDUPTHER

## 2022-12-29 RX ORDER — SODIUM CHLORIDE 0.9 % (FLUSH) 0.9 %
5-40 SYRINGE (ML) INJECTION PRN
Status: DISCONTINUED | OUTPATIENT
Start: 2022-12-29 | End: 2022-12-29 | Stop reason: HOSPADM

## 2022-12-29 RX ORDER — LIDOCAINE HYDROCHLORIDE 20 MG/ML
INJECTION, SOLUTION EPIDURAL; INFILTRATION; INTRACAUDAL; PERINEURAL PRN
Status: DISCONTINUED | OUTPATIENT
Start: 2022-12-29 | End: 2022-12-29 | Stop reason: SDUPTHER

## 2022-12-29 RX ORDER — MIDAZOLAM HYDROCHLORIDE 1 MG/ML
INJECTION INTRAMUSCULAR; INTRAVENOUS PRN
Status: DISCONTINUED | OUTPATIENT
Start: 2022-12-29 | End: 2022-12-29 | Stop reason: SDUPTHER

## 2022-12-29 RX ORDER — SODIUM CHLORIDE 0.9 % (FLUSH) 0.9 %
5-40 SYRINGE (ML) INJECTION EVERY 12 HOURS SCHEDULED
Status: DISCONTINUED | OUTPATIENT
Start: 2022-12-29 | End: 2022-12-29 | Stop reason: HOSPADM

## 2022-12-29 RX ORDER — ROCURONIUM BROMIDE 10 MG/ML
INJECTION, SOLUTION INTRAVENOUS PRN
Status: DISCONTINUED | OUTPATIENT
Start: 2022-12-29 | End: 2022-12-29 | Stop reason: SDUPTHER

## 2022-12-29 RX ORDER — ONDANSETRON 2 MG/ML
INJECTION INTRAMUSCULAR; INTRAVENOUS PRN
Status: DISCONTINUED | OUTPATIENT
Start: 2022-12-29 | End: 2022-12-29 | Stop reason: SDUPTHER

## 2022-12-29 RX ORDER — SODIUM CHLORIDE 9 MG/ML
INJECTION, SOLUTION INTRAVENOUS PRN
Status: DISCONTINUED | OUTPATIENT
Start: 2022-12-29 | End: 2022-12-29 | Stop reason: HOSPADM

## 2022-12-29 RX ORDER — GLYCOPYRROLATE 0.2 MG/ML
INJECTION INTRAMUSCULAR; INTRAVENOUS PRN
Status: DISCONTINUED | OUTPATIENT
Start: 2022-12-29 | End: 2022-12-29 | Stop reason: SDUPTHER

## 2022-12-29 RX ORDER — FENTANYL CITRATE 50 UG/ML
INJECTION, SOLUTION INTRAMUSCULAR; INTRAVENOUS PRN
Status: DISCONTINUED | OUTPATIENT
Start: 2022-12-29 | End: 2022-12-29 | Stop reason: SDUPTHER

## 2022-12-29 RX ORDER — ONDANSETRON 2 MG/ML
4 INJECTION INTRAMUSCULAR; INTRAVENOUS
Status: DISCONTINUED | OUTPATIENT
Start: 2022-12-29 | End: 2022-12-29 | Stop reason: HOSPADM

## 2022-12-29 RX ORDER — HYDROCODONE BITARTRATE AND ACETAMINOPHEN 5; 325 MG/1; MG/1
1 TABLET ORAL EVERY 6 HOURS PRN
Qty: 10 TABLET | Refills: 0 | Status: SHIPPED | OUTPATIENT
Start: 2022-12-29 | End: 2023-01-01

## 2022-12-29 RX ORDER — DEXAMETHASONE SODIUM PHOSPHATE 4 MG/ML
INJECTION, SOLUTION INTRA-ARTICULAR; INTRALESIONAL; INTRAMUSCULAR; INTRAVENOUS; SOFT TISSUE PRN
Status: DISCONTINUED | OUTPATIENT
Start: 2022-12-29 | End: 2022-12-29 | Stop reason: SDUPTHER

## 2022-12-29 RX ORDER — MAGNESIUM HYDROXIDE 1200 MG/15ML
LIQUID ORAL CONTINUOUS PRN
Status: COMPLETED | OUTPATIENT
Start: 2022-12-29 | End: 2022-12-29

## 2022-12-29 RX ORDER — LIDOCAINE HYDROCHLORIDE AND EPINEPHRINE 10; 10 MG/ML; UG/ML
INJECTION, SOLUTION INFILTRATION; PERINEURAL
Status: COMPLETED | OUTPATIENT
Start: 2022-12-29 | End: 2022-12-29

## 2022-12-29 RX ORDER — PROPOFOL 10 MG/ML
INJECTION, EMULSION INTRAVENOUS PRN
Status: DISCONTINUED | OUTPATIENT
Start: 2022-12-29 | End: 2022-12-29 | Stop reason: SDUPTHER

## 2022-12-29 RX ADMIN — LIDOCAINE HYDROCHLORIDE 100 MG: 20 INJECTION, SOLUTION EPIDURAL; INFILTRATION; INTRACAUDAL; PERINEURAL at 13:53

## 2022-12-29 RX ADMIN — FENTANYL CITRATE 50 MCG: 50 INJECTION INTRAMUSCULAR; INTRAVENOUS at 13:53

## 2022-12-29 RX ADMIN — PROPOFOL 200 MG: 10 INJECTION, EMULSION INTRAVENOUS at 13:53

## 2022-12-29 RX ADMIN — PROPOFOL 100 MG: 10 INJECTION, EMULSION INTRAVENOUS at 13:58

## 2022-12-29 RX ADMIN — MIDAZOLAM 2 MG: 1 INJECTION INTRAMUSCULAR; INTRAVENOUS at 13:49

## 2022-12-29 RX ADMIN — DEXAMETHASONE SODIUM PHOSPHATE 8 MG: 4 INJECTION, SOLUTION INTRAMUSCULAR; INTRAVENOUS at 13:58

## 2022-12-29 RX ADMIN — ONDANSETRON 4 MG: 2 INJECTION INTRAMUSCULAR; INTRAVENOUS at 14:05

## 2022-12-29 RX ADMIN — SODIUM CHLORIDE: 9 INJECTION, SOLUTION INTRAVENOUS at 13:31

## 2022-12-29 RX ADMIN — GLYCOPYRROLATE 0.1 MG: 0.2 INJECTION, SOLUTION INTRAMUSCULAR; INTRAVENOUS at 13:49

## 2022-12-29 RX ADMIN — CEFTRIAXONE 2000 MG: 2 INJECTION, POWDER, FOR SOLUTION INTRAMUSCULAR; INTRAVENOUS at 13:49

## 2022-12-29 RX ADMIN — FENTANYL CITRATE 50 MCG: 50 INJECTION INTRAMUSCULAR; INTRAVENOUS at 13:49

## 2022-12-29 RX ADMIN — PROPOFOL 100 MG: 10 INJECTION, EMULSION INTRAVENOUS at 13:54

## 2022-12-29 RX ADMIN — Medication 140 MG: at 13:54

## 2022-12-29 RX ADMIN — GLYCOPYRROLATE 0.2 MG: 0.2 INJECTION, SOLUTION INTRAMUSCULAR; INTRAVENOUS at 14:17

## 2022-12-29 RX ADMIN — SODIUM CHLORIDE: 9 INJECTION, SOLUTION INTRAVENOUS at 14:26

## 2022-12-29 RX ADMIN — ROCURONIUM BROMIDE 5 MG: 10 INJECTION, SOLUTION INTRAVENOUS at 13:53

## 2022-12-29 ASSESSMENT — PAIN DESCRIPTION - DESCRIPTORS
DESCRIPTORS: DISCOMFORT
DESCRIPTORS: DISCOMFORT

## 2022-12-29 ASSESSMENT — PAIN DESCRIPTION - ORIENTATION: ORIENTATION: LOWER

## 2022-12-29 ASSESSMENT — PAIN DESCRIPTION - ONSET: ONSET: ON-GOING

## 2022-12-29 ASSESSMENT — PAIN DESCRIPTION - LOCATION
LOCATION: OTHER (COMMENT)
LOCATION: OTHER (COMMENT)

## 2022-12-29 ASSESSMENT — PAIN DESCRIPTION - FREQUENCY: FREQUENCY: INTERMITTENT

## 2022-12-29 ASSESSMENT — PAIN - FUNCTIONAL ASSESSMENT
PAIN_FUNCTIONAL_ASSESSMENT: 0-10
PAIN_FUNCTIONAL_ASSESSMENT: PREVENTS OR INTERFERES SOME ACTIVE ACTIVITIES AND ADLS

## 2022-12-29 ASSESSMENT — PAIN SCALES - GENERAL
PAINLEVEL_OUTOF10: 3
PAINLEVEL_OUTOF10: 3

## 2022-12-29 ASSESSMENT — PAIN DESCRIPTION - PAIN TYPE: TYPE: SURGICAL PAIN

## 2022-12-29 ASSESSMENT — ENCOUNTER SYMPTOMS: SHORTNESS OF BREATH: 0

## 2022-12-29 NOTE — BRIEF OP NOTE
Brief Postoperative Note      Patient: Jonas Maharaj  YOB: 1970  MRN: [id number]    Date of Procedure: 12/29/2022    Pre-Op Diagnosis: STRESS INCONTINENCE, FEMALE    Post-Op Diagnosis: Same       Procedure(s):  CYSTOSCOPY TRANS VAGINAL TAPE    Surgeon(s):  Soniya Sarmiento MD    Assistant:  Surgical Assistant: Luciana Kilgore    Anesthesia: General    Estimated Blood Loss (mL): less than 50     Complications: None    Specimens:   * No specimens in log *    Implants:  Implant Name Type Inv.  Item Serial No.  Lot No. LRB No. Used Action   SLING TRNSVAG MID URETH W/ JANETH MESH AND DEL DEV SYS ADVNTG - [id number]  SLING TRNSVAG MID URETH W/ JANETH MESH AND DEL DEV SYS ADVNTG  [facility]- [id number] N/A 1 Implanted         Drains: * No LDAs found *    Findings: normal cystoscopy    Electronically signed by Soniya Sarmiento MD on 12/29/2022 at 2:27 PM

## 2022-12-29 NOTE — ANESTHESIA POSTPROCEDURE EVALUATION
Department of Anesthesiology  Postprocedure Note    Patient: Glenn Ortiz  MRN: [id number]  YOB: 1970  Date of evaluation: 12/29/2022      Procedure Summary     Date: 12/29/22 Room / Location: [address] [facility]    Anesthesia Start: 6183 Anesthesia Stop: 0006    Procedure: CYSTOSCOPY TRANS VAGINAL TAPE (Vagina ) Diagnosis:       Stress incontinence, female      (STRESS INCONTINENCE, FEMALE)    Surgeons: Juanita Johnson MD Responsible Provider: Jeison Parada MD    Anesthesia Type: general ASA Status: 3          Anesthesia Type: No value filed.     Johanna Phase I: Johanna Score: 10    Johanna Phase II: Johanna Score: 10      Anesthesia Post Evaluation    Patient location during evaluation: PACU  Level of consciousness: awake and alert  Airway patency: patent  Nausea & Vomiting: no nausea and no vomiting  Complications: no  Cardiovascular status: blood pressure returned to baseline  Respiratory status: acceptable  Hydration status: euvolemic  Comments: Postoperative Anesthesia Note    Name:    Glenn Ortiz  MRN:      [id number]    Patient Vitals in the past 12 hrs:  12/29/22 1514, BP:133/71, Temp:97.6 °F (36.4 °C), Temp src:Temporal, Pulse:73, Resp:18, SpO2:92 %  12/29/22 1500, BP:118/67, Temp:97.8 °F (36.6 °C), Pulse:78, Resp:17, SpO2:92 %  12/29/22 1450, BP:115/70, Pulse:84, Resp:22, SpO2:92 %  12/29/22 1445, BP:110/67, Pulse:89, Resp:18, SpO2:94 %  12/29/22 1444, BP:(!) 115/58, Temp:97.2 °F (36.2 °C), Temp src:Temporal, Pulse:94, Resp:20, SpO2:94 %  12/29/22 1250, BP:(!) 136/101, Temp:(!) 96 °F (35.6 °C), Temp src:Temporal, Pulse:73, Resp:16, SpO2:95 %, Height:5' 10\" (1.778 m), Weight:(!) 302 lb 9.3 oz (137.2 kg)     LABS:    CBC  Lab Results       Component                Value               Date/Time                  WBC                      6.7                 12/19/2022 02:48 PM        HGB                      13.4                12/19/2022 02:48 PM        HCT 40.4                12/19/2022 02:48 PM        PLT                      300                 12/19/2022 02:48 PM   RENAL  Lab Results       Component                Value               Date/Time                  NA                       138                 12/19/2022 02:48 PM        K                        3.7                 12/19/2022 02:48 PM        K                        4.0                 08/28/2022 04:03 PM        CL                       103                 12/19/2022 02:48 PM        CO2                      27                  12/19/2022 02:48 PM        BUN                      11                  12/19/2022 02:48 PM        CREATININE               0.7                 12/19/2022 02:48 PM        GLUCOSE                  96                  12/19/2022 02:48 PM   COAGS  Lab Results       Component                Value               Date/Time                  PROTIME                  13.0                12/19/2022 02:48 PM        INR                      0.99                12/19/2022 02:48 PM        APTT                     31.9                12/19/2022 02:48 PM     Intake & Output:  @51RIVE@    Nausea & Vomiting:  No    Level of Consciousness:  Awake    Pain Assessment:  Adequate analgesia    Anesthesia Complications:  No apparent anesthetic complications    SUMMARY      Vital signs stable  OK to discharge from Stage I post anesthesia care.   Care transferred from Anesthesiology department on discharge from perioperative area

## 2022-12-29 NOTE — DISCHARGE INSTRUCTIONS
No lifting > 15 pounds X 6 weeks  No strenuous exercise  No intercourse or tampons X 6 weeks    OK to shower in 24 hours  No driving on pain meds    Follow up in the office in 2 weeks    Voiding trial in recovery room:  Fill bladder with 250 ml saline via robledo and remove robledo.   OK to dc home if she can void 150 ml

## 2022-12-29 NOTE — PROGRESS NOTES
Patient arrived to phase 2. Patient states discomfort of 3/10, but tolerable. 2 small incisions to pubis clean, dry and intact. Gordillo in place.      Electronically signed by Seema Frank RN on 12/29/2022 at 3:18 PM

## 2022-12-29 NOTE — PROGRESS NOTES
Patients SPO2 in chair 90-91%. This RN had patient sit up in chair and switch fingers. Now at 92%. Per vital signs history, 92% in PACU and beginning of phase 2. Dr. Tammy Gracia called to make aware. No new orders.      Electronically signed by Alvaro Lau RN on 12/29/2022 at 4:41 PM

## 2022-12-29 NOTE — PROGRESS NOTES
Patient voided 25 mL. Dr. Olinda Denson made aware via phonecall. Per Dr. Olinda Denson, if patients bladder does not feel full, patient okay to go. If patients bladder still feels full, place catheter and to follow up Monday in office. Patient states she feels fine and does not feel full. Educated patient and family to call Dr. Olinda Denson for any concerns at home.        Electronically signed by Reese Calderon RN on 12/29/2022 at 4:28 PM

## 2022-12-29 NOTE — PROGRESS NOTES
Pt discharged to home. Transported in wheelchair. Accompanied by spouse. Transported in personal vehicle. Reeducated patient and family to call Dr. Delia Laurent if patient feels bladder is not emptying or any other concerns. Discharge instructions and personal belongings given to pt. Explanation of discharge medications and instructions understood by verbal statement. No questions, comments or concerns at this time.        Electronically signed by Radha Perry RN on 12/29/2022 at 4:53 PM

## 2022-12-29 NOTE — PROGRESS NOTES
Patient only able to void 120 mL. Dr. Love Rush made aware. Per Dr. Love Rush, have patient wait and attempt one more time. This RN to call MD back with output.        Electronically signed by Edmundo Marrero RN on 12/29/2022 at 3:46 PM

## 2022-12-29 NOTE — INTERVAL H&P NOTE
Update History & Physical    The patient's History and Physical was reviewed with the patient and I examined the patient. There was no change. The surgical site was confirmed by the patient and me. Plan: The risks, benefits, expected outcome, and alternative to the recommended procedure have been discussed with the patient. Patient understands and wants to proceed with the procedure.      Electronically signed by Eusebio Almazan MD on 12/29/2022 at 1:31 PM

## 2022-12-29 NOTE — PROGRESS NOTES
250 mL sterile water placed into bladder through robledo. Robledo removed. Patient tolerated well.      Electronically signed by Seema Frank RN on 12/29/2022 at 3:35 PM

## 2022-12-29 NOTE — ANESTHESIA PRE PROCEDURE
[address] [facility] Department of Anesthesiology  Pre-Anesthesia Evaluation/Consultation       Name:  Tiff Cunningham  : 1970  Age:  47 y.o.                                            MRN:  [id number]  Date: 2022           Surgeon: Deborah Christy):  Nikko Young MD    Procedure: Procedure(s):  COLONOSCOPY     No Known Allergies  Patient Active Problem List   Diagnosis    Hyperlipidemia    Depression    Gilbert's disease    Gastroesophageal reflux disease with esophagitis    History of TIA (transient ischemic attack)    Morbid obesity with BMI of 40.0-44.9, adult ([address]    Metabolic syndrome    Numbness    Left cervical radiculopathy    Pharyngeal abscess    Status post placement of implantable loop recorder     Past Medical History:   Diagnosis Date    Arthritis     Cancer ([address]     basal-skin    Cerebral artery occlusion with cerebral infarction ([address]     \"mini stroke\"    Depression     GERD (gastroesophageal reflux disease)     History of prolapse of bladder     Hyperlipidemia     Implantable loop recorder present     Wears glasses      Past Surgical History:   Procedure Laterality Date     SECTION      x1    CHOLECYSTECTOMY      COLONOSCOPY  2020    Dr. Kirt Moralez COLONOSCOPY N/A 2020    COLONOSCOPY performed by Chayo Rudd MD at [facility] Bilateral     INSERTABLE CARDIAC MONITOR      loop recorder    UTERINE FIBROID SURGERY      removed fibroid     Social History     Tobacco Use    Smoking status: Never    Smokeless tobacco: Never   Vaping Use    Vaping Use: Never used   Substance Use Topics    Alcohol use: Yes     Comment: socially    Drug use: Never     Medications  Current Facility-Administered Medications on File Prior to Visit   Medication Dose Route Frequency Provider Last Rate Last Admin    cefTRIAXone (ROCEPHIN) 2,000 mg in dextrose 5 % 50 mL IVPB mini-bag  2,000 mg IntraVENous Once Jay Espinosa MD        sodium chloride flush 0.9 % injection 5-40 mL  5-40 mL IntraVENous 2 times per day Mya Monique MD        sodium chloride flush 0.9 % injection 5-40 mL  5-40 mL IntraVENous PRN Mya Monique MD        0.9 % sodium chloride infusion   IntraVENous PRN Mya Monique MD         Current Outpatient Medications on File Prior to Visit   Medication Sig Dispense Refill    Cyanocobalamin (VITAMIN B-12 IJ) Inject 1,000 mcg as directed every 30 days      atorvastatin (LIPITOR) 40 MG tablet TAKE 1 TABLET BY MOUTH AT  NIGHT 90 tablet 3    albuterol sulfate HFA (PROVENTIL HFA) 108 (90 Base) MCG/ACT inhaler Inhale 2 puffs into the lungs every 6 hours as needed for Wheezing 18 g 3    pantoprazole (PROTONIX) 40 MG tablet Take 1 tablet by mouth every morning (before breakfast) 30 tablet 0    Semaglutide,0.25 or 0.5MG/DOS, (OZEMPIC, 0.25 OR 0.5 MG/DOSE,) 2 MG/1.5ML SOPN Inject 0.5 mg into the skin once a week Sample from PCP      melatonin 3 MG TABS tablet Take 3 mg by mouth nightly as needed (sleep)       sertraline (ZOLOFT) 50 MG tablet TAKE 1 TABLET BY MOUTH IN  THE EVENING 90 tablet 3    Ascorbic Acid (VITAMIN C PO) Take 500 mg by mouth daily       aspirin 81 MG chewable tablet Take 81 mg by mouth daily       No current facility-administered medications for this visit. No current outpatient medications on file.      Facility-Administered Medications Ordered in Other Visits   Medication Dose Route Frequency Provider Last Rate Last Admin    cefTRIAXone (ROCEPHIN) 2,000 mg in dextrose 5 % 50 mL IVPB mini-bag  2,000 mg IntraVENous Once Jay Espinosa MD        sodium chloride flush 0.9 % injection 5-40 mL  5-40 mL IntraVENous 2 times per day Mya Monique MD        sodium chloride flush 0.9 % injection 5-40 mL  5-40 mL IntraVENous PRN Mya Monique MD        0.9 % sodium chloride infusion   IntraVENous PRN Mya Monique MD         Vital Signs (Current)   There were no vitals filed for this visit. Vital Signs Statistics (for past 48 hrs)     Temp  Av °F (35.6 °C)  Min: 96 °F (35.6 °C)   Min taken time: 22 1250  Max: 96 °F (35.6 °C)   Max taken time: 22 1250  Pulse  Av  Min: 73   Min taken time: 22 1250  Max: 73   Max taken time: 22 1250  Resp  Av  Min: 16   Min taken time: 22 1250  Max: 16   Max taken time: 22 1250  BP  Min: 136/101   Min taken time: 22 1250  Max: 136/101   Max taken time: 22 1250  SpO2  Av %  Min: 95 %   Min taken time: 22 1250  Max: 95 %   Max taken time: 22 1250    BP Readings from Last 3 Encounters:   22 (!) 136/101   22 118/78   10/01/22 130/80     BMI  There is no height or weight on file to calculate BMI. Estimated body mass index is 43.42 kg/m² as calculated from the following:    Height as of an earlier encounter on 22: 5' 10\" (1.778 m). Weight as of an earlier encounter on 22: 302 lb 9.3 oz (137.2 kg).     CBC   Lab Results   Component Value Date/Time    WBC 6.7 2022 02:48 PM    RBC 4.66 2022 02:48 PM    HGB 13.4 2022 02:48 PM    HCT 40.4 2022 02:48 PM    MCV 86.5 2022 02:48 PM    RDW 13.9 2022 02:48 PM     2022 02:48 PM     CMP    Lab Results   Component Value Date/Time     2022 02:48 PM    K 3.7 2022 02:48 PM    K 4.0 2022 04:03 PM     2022 02:48 PM    CO2 27 2022 02:48 PM    BUN 11 2022 02:48 PM    CREATININE 0.7 2022 02:48 PM    GFRAA >60 10/01/2022 02:05 PM    AGRATIO 1.5 2022 02:48 PM    LABGLOM >60 2022 02:48 PM    GLUCOSE 96 2022 02:48 PM    PROT 7.2 2022 02:48 PM    PROT 7.6 10/04/2012 09:16 AM    CALCIUM 9.1 2022 02:48 PM    BILITOT 2.5 2022 02:48 PM    ALKPHOS 76 2022 02:48 PM    AST 17 2022 02:48 PM    ALT 18 2022 02:48 PM     BMP    Lab Results   Component Value Date/Time     12/19/2022 02:48 PM    K 3.7 12/19/2022 02:48 PM    K 4.0 08/28/2022 04:03 PM     12/19/2022 02:48 PM    CO2 27 12/19/2022 02:48 PM    BUN 11 12/19/2022 02:48 PM    CREATININE 0.7 12/19/2022 02:48 PM    CALCIUM 9.1 12/19/2022 02:48 PM    GFRAA >60 10/01/2022 02:05 PM    LABGLOM >60 12/19/2022 02:48 PM    GLUCOSE 96 12/19/2022 02:48 PM     POCGlucose  No results for input(s): GLUCOSE in the last 72 hours. [facility]    Lab Results   Component Value Date/Time    PROTIME 13.0 12/19/2022 02:48 PM    INR 0.99 12/19/2022 02:48 PM    APTT 31.9 12/19/2022 02:48 PM     HCG (If Applicable)   Lab Results   Component Value Date    PREGTESTUR Negative 07/06/2020      ABGs No results found for: PHART, PO2ART, JRW6CSU, NWM1PBG, BEART, T0FLAJMU   Type & Screen (If Applicable)  No results found for: LABABO, LABRH                         BMI: Wt Readings from Last 3 Encounters:       NPO Status:                          Anesthesia Evaluation  Patient summary reviewed and Nursing notes reviewed no history of anesthetic complications:   Airway: Mallampati: III  TM distance: >3 FB   Neck ROM: full     Dental:          Pulmonary:Negative Pulmonary ROS       (-) COPD, asthma and shortness of breath                           Cardiovascular:  Exercise tolerance: good (>4 METS),       (-) hypertension, valvular problems/murmurs, past MI, CAD, CABG/stent, dysrhythmias,  angina and no hyperlipidemia    ECG reviewed      Echocardiogram reviewed               ROS comment: EF 55       Neuro/Psych:   (+) CVA:, neuromuscular disease:, TIA (patient denies though in history), psychiatric history:depression/anxiety    (-) seizures           GI/Hepatic/Renal:   (+) GERD:, bowel prep, morbid obesity     (-) PUD, liver disease and no renal disease       Endo/Other: Negative Endo/Other ROS       (-) diabetes mellitus               Abdominal:             Vascular: negative vascular ROS.          Other Findings: Anesthesia Plan      general     ASA 3     (I discussed with the patient the risks and benefits of PIV, general anesthesia, IV Narcotics, PACU. All questions were answered the patient agrees with the plan.)  Induction: intravenous. MIPS: Postoperative opioids intended and Prophylactic antiemetics administered. Anesthetic plan and risks discussed with patient. Plan discussed with CRNA. This pre-anesthesia assessment may be used as a history and physical.    DOS STAFF ADDENDUM:    Pt seen and examined, chart reviewed (including anesthesia, drug and allergy history). No interval changes to history and physical examination. Anesthetic plan, risks, benefits, alternatives, and personnel involved discussed with patient. Questions and concerns addressed. Patient(family) verbalized an understanding and agrees to proceed.       Jennifer Lucas MD  December 29, 2022  1:33 PM

## 2022-12-29 NOTE — PROGRESS NOTES
Pt to phase 2 in stable condition. Offers no c/o pain or nausea. Surgical site unchanged. Gordillo cath with small amount of clear yellow urine.

## 2022-12-29 NOTE — PROGRESS NOTES
Updated Dr. Olinda Denson that patients bladder does not feel full per patient. Per Dr. Olinda Denson, okay to be discharged.        Electronically signed by Reese Calderon RN on 12/29/2022 at 4:30 PM

## 2022-12-30 NOTE — OP NOTE
[address] [place] [address]                                OPERATIVE REPORT    PATIENT NAME: Aleksandra Marie                       :        1970  MED REC NO:   [id number]                          ROOM:  ACCOUNT NO:   [de-identified]                           ADMIT DATE: 2022  PROVIDER:     Vitaly Rowell MD    DATE OF PROCEDURE:  2022    PREOPERATIVE DIAGNOSIS:  Stress urinary incontinence. POSTOPERATIVE DIAGNOSIS:  Stress urinary incontinence. OPERATION PERFORMED:  Retropubic mid urethral sling using the TVT  Advantage Fit blue kit and cystoscopy. The procedure was complicated  due to the patient's obesity. SURGEON:  Vitaly Rowell MD    ANESTHESIA:  General.    COMPLICATIONS:  None. BLOOD LOSS:  Less than 50 mL. INDICATIONS:  A 81-year-old female with profound stress urinary  incontinence. She failed conservative management. She is here for  sling placement. OPERATIVE PROCEDURE:  She was given Rocephin. She was taken to the  operative suite. She moved onto the table. Anesthesia was induced. Her position was changed to the lithotomy position. Her genitalia were  prepped and draped. The lower abdomen was also prepped. An 18-Tamazight  Gordillo catheter was inserted with return of clear urine. A site just  above the pubic symphysis 1.5 cm lateral to midline was marked  bilaterally. Starting on the left side, a spinal needle was advanced  through the marked site behind the pubic symphysis to the level of the  mid urethra; 20 mL of injectable saline was injected here. This was  repeated on the right side. Now the level of the mid urethra was  identified in the anterior vaginal wall. The site was anesthetized with  1% lidocaine with epinephrine. A 1 cm incision was made here about a  centimeter back from the urethral meatus.   I then dissected out  bilaterally toward the ischiopubic rami. A catheter guide was placed in  the catheter and the bladder was deflected towards the patient's right  side. The trocar was used to place the left arm of the sling to the  left of the urethra behind the pubic symphysis and passed through the  previously marked site in the anterior abdominal wall. The bladder was  then deflected in the opposite direction and the right arm of the sling  was placed in a similar fashion. The catheter was then withdrawn and  cystoscopy was performed. This showed a normal bladder. Both ureteral  orifices were normal.  The bladder mucosa was normal.  There was no  evidence of bladder or urethral injury. The scope was withdrawn and the  catheter was replaced. The sling was then carefully tensioned with a  curved Lyn scissors between the sling and the urethra. The plastic  sheath was pulled away. The excess mesh was cut flush with the  abdominal wall incisions. The tension was again checked. There was no  tension on the sling. The incision was irrigated and then closed with a  running 2-0 Vicryl stitch. The abdominal wall incisions were closed  with Dermabond. The patient was then awakened and transferred to  Recovery having tolerated the procedure well. She is going to follow up  in the office in 2 weeks.         Lianne López MD    D: 12/29/2022 14:42:20       T: 12/29/2022 14:46:23     [email or web address]  Job#: [id number]     Doc#: [id number]    CC:

## 2023-01-12 ENCOUNTER — ANESTHESIA (OUTPATIENT)
Dept: OPERATING ROOM | Age: 53
End: 2023-01-12
Payer: COMMERCIAL

## 2023-01-12 ENCOUNTER — ANESTHESIA EVENT (OUTPATIENT)
Dept: OPERATING ROOM | Age: 53
End: 2023-01-12
Payer: COMMERCIAL

## 2023-01-12 ENCOUNTER — HOSPITAL ENCOUNTER (OUTPATIENT)
Age: 53
Setting detail: OBSERVATION
Discharge: HOME | End: 2023-01-13
Attending: EMERGENCY MEDICINE | Admitting: UROLOGY
Payer: COMMERCIAL

## 2023-01-12 DIAGNOSIS — T81.9XXA COMPLICATION OF PROCEDURE, INITIAL ENCOUNTER: Primary | ICD-10-CM

## 2023-01-12 PROBLEM — N93.9 VAGINAL BLEEDING: Status: ACTIVE | Noted: 2023-01-12

## 2023-01-12 LAB
ANION GAP SERPL CALCULATED.3IONS-SCNC: 10 MMOL/L (ref 3–16)
BASOPHILS ABSOLUTE: 0.1 K/UL (ref 0–0.2)
BASOPHILS RELATIVE PERCENT: 0.8 %
BUN BLDV-MCNC: 14 MG/DL (ref 7–20)
CALCIUM SERPL-MCNC: 10.1 MG/DL (ref 8.3–10.6)
CHLORIDE BLD-SCNC: 104 MMOL/L (ref 99–110)
CO2: 27 MMOL/L (ref 21–32)
CREAT SERPL-MCNC: 0.7 MG/DL (ref 0.6–1.1)
EOSINOPHILS ABSOLUTE: 0.2 K/UL (ref 0–0.6)
EOSINOPHILS RELATIVE PERCENT: 2.3 %
GFR SERPL CREATININE-BSD FRML MDRD: >60 ML/MIN/{1.73_M2}
GLUCOSE BLD-MCNC: 99 MG/DL (ref 70–99)
HCT VFR BLD CALC: 42.5 % (ref 36–48)
HEMOGLOBIN: 13.7 G/DL (ref 12–16)
LYMPHOCYTES ABSOLUTE: 2.7 K/UL (ref 1–5.1)
LYMPHOCYTES RELATIVE PERCENT: 29.7 %
MCH RBC QN AUTO: 28 PG (ref 26–34)
MCHC RBC AUTO-ENTMCNC: 32.2 G/DL (ref 31–36)
MCV RBC AUTO: 87 FL (ref 80–100)
MONOCYTES ABSOLUTE: 0.7 K/UL (ref 0–1.3)
MONOCYTES RELATIVE PERCENT: 7.1 %
NEUTROPHILS ABSOLUTE: 5.5 K/UL (ref 1.7–7.7)
NEUTROPHILS RELATIVE PERCENT: 60.1 %
PDW BLD-RTO: 14.2 % (ref 12.4–15.4)
PLATELET # BLD: 323 K/UL (ref 135–450)
PMV BLD AUTO: 8.9 FL (ref 5–10.5)
POTASSIUM REFLEX MAGNESIUM: 3.9 MMOL/L (ref 3.5–5.1)
RBC # BLD: 4.88 M/UL (ref 4–5.2)
SODIUM BLD-SCNC: 141 MMOL/L (ref 136–145)
WBC # BLD: 9.2 K/UL (ref 4–11)

## 2023-01-12 PROCEDURE — 7100000000 HC PACU RECOVERY - FIRST 15 MIN: Performed by: UROLOGY

## 2023-01-12 PROCEDURE — 7100000001 HC PACU RECOVERY - ADDTL 15 MIN: Performed by: UROLOGY

## 2023-01-12 PROCEDURE — G0378 HOSPITAL OBSERVATION PER HR: HCPCS

## 2023-01-12 PROCEDURE — 3600000002 HC SURGERY LEVEL 2 BASE: Performed by: UROLOGY

## 2023-01-12 PROCEDURE — 3700000000 HC ANESTHESIA ATTENDED CARE: Performed by: UROLOGY

## 2023-01-12 PROCEDURE — 6360000002 HC RX W HCPCS: Performed by: ANESTHESIOLOGY

## 2023-01-12 PROCEDURE — 2709999900 HC NON-CHARGEABLE SUPPLY: Performed by: UROLOGY

## 2023-01-12 PROCEDURE — 6360000002 HC RX W HCPCS: Performed by: UROLOGY

## 2023-01-12 PROCEDURE — 6370000000 HC RX 637 (ALT 250 FOR IP): Performed by: UROLOGY

## 2023-01-12 PROCEDURE — 2580000003 HC RX 258: Performed by: UROLOGY

## 2023-01-12 PROCEDURE — 85025 COMPLETE CBC W/AUTO DIFF WBC: CPT

## 2023-01-12 PROCEDURE — 99285 EMERGENCY DEPT VISIT HI MDM: CPT

## 2023-01-12 PROCEDURE — 3700000001 HC ADD 15 MINUTES (ANESTHESIA): Performed by: UROLOGY

## 2023-01-12 PROCEDURE — A4217 STERILE WATER/SALINE, 500 ML: HCPCS | Performed by: UROLOGY

## 2023-01-12 PROCEDURE — 2500000003 HC RX 250 WO HCPCS: Performed by: ANESTHESIOLOGY

## 2023-01-12 PROCEDURE — 80048 BASIC METABOLIC PNL TOTAL CA: CPT

## 2023-01-12 PROCEDURE — 3600000012 HC SURGERY LEVEL 2 ADDTL 15MIN: Performed by: UROLOGY

## 2023-01-12 PROCEDURE — 36415 COLL VENOUS BLD VENIPUNCTURE: CPT

## 2023-01-12 RX ORDER — MORPHINE SULFATE 2 MG/ML
2 INJECTION, SOLUTION INTRAMUSCULAR; INTRAVENOUS
Status: DISCONTINUED | OUTPATIENT
Start: 2023-01-12 | End: 2023-01-13 | Stop reason: HOSPADM

## 2023-01-12 RX ORDER — LANOLIN ALCOHOL/MO/W.PET/CERES
3 CREAM (GRAM) TOPICAL NIGHTLY PRN
Status: DISCONTINUED | OUTPATIENT
Start: 2023-01-12 | End: 2023-01-13 | Stop reason: HOSPADM

## 2023-01-12 RX ORDER — SODIUM CHLORIDE 9 MG/ML
INJECTION, SOLUTION INTRAVENOUS PRN
Status: DISCONTINUED | OUTPATIENT
Start: 2023-01-12 | End: 2023-01-13 | Stop reason: HOSPADM

## 2023-01-12 RX ORDER — SODIUM CHLORIDE 0.9 % (FLUSH) 0.9 %
5-40 SYRINGE (ML) INJECTION EVERY 12 HOURS SCHEDULED
Status: DISCONTINUED | OUTPATIENT
Start: 2023-01-12 | End: 2023-01-13 | Stop reason: HOSPADM

## 2023-01-12 RX ORDER — SODIUM CHLORIDE 9 MG/ML
INJECTION, SOLUTION INTRAVENOUS CONTINUOUS
Status: DISCONTINUED | OUTPATIENT
Start: 2023-01-12 | End: 2023-01-13 | Stop reason: HOSPADM

## 2023-01-12 RX ORDER — ALBUTEROL SULFATE 90 UG/1
2 AEROSOL, METERED RESPIRATORY (INHALATION) EVERY 6 HOURS PRN
Status: DISCONTINUED | OUTPATIENT
Start: 2023-01-12 | End: 2023-01-13 | Stop reason: HOSPADM

## 2023-01-12 RX ORDER — PANTOPRAZOLE SODIUM 40 MG/1
40 TABLET, DELAYED RELEASE ORAL
Status: DISCONTINUED | OUTPATIENT
Start: 2023-01-13 | End: 2023-01-13 | Stop reason: HOSPADM

## 2023-01-12 RX ORDER — ONDANSETRON 2 MG/ML
4 INJECTION INTRAMUSCULAR; INTRAVENOUS
Status: DISCONTINUED | OUTPATIENT
Start: 2023-01-12 | End: 2023-01-12 | Stop reason: HOSPADM

## 2023-01-12 RX ORDER — SODIUM CHLORIDE 0.9 % (FLUSH) 0.9 %
5-40 SYRINGE (ML) INJECTION EVERY 12 HOURS SCHEDULED
Status: DISCONTINUED | OUTPATIENT
Start: 2023-01-12 | End: 2023-01-12 | Stop reason: HOSPADM

## 2023-01-12 RX ORDER — SUCCINYLCHOLINE/SOD CL,ISO/PF 200MG/10ML
SYRINGE (ML) INTRAVENOUS PRN
Status: DISCONTINUED | OUTPATIENT
Start: 2023-01-12 | End: 2023-01-12 | Stop reason: SDUPTHER

## 2023-01-12 RX ORDER — ASCORBIC ACID 500 MG
500 TABLET ORAL DAILY
Status: DISCONTINUED | OUTPATIENT
Start: 2023-01-13 | End: 2023-01-13 | Stop reason: HOSPADM

## 2023-01-12 RX ORDER — OXYCODONE HYDROCHLORIDE 5 MG/1
5 TABLET ORAL PRN
Status: DISCONTINUED | OUTPATIENT
Start: 2023-01-12 | End: 2023-01-12 | Stop reason: HOSPADM

## 2023-01-12 RX ORDER — ACETAMINOPHEN 325 MG/1
650 TABLET ORAL EVERY 4 HOURS PRN
Status: DISCONTINUED | OUTPATIENT
Start: 2023-01-12 | End: 2023-01-13 | Stop reason: HOSPADM

## 2023-01-12 RX ORDER — SODIUM CHLORIDE 0.9 % (FLUSH) 0.9 %
5-40 SYRINGE (ML) INJECTION PRN
Status: DISCONTINUED | OUTPATIENT
Start: 2023-01-12 | End: 2023-01-12 | Stop reason: HOSPADM

## 2023-01-12 RX ORDER — CEFAZOLIN SODIUM 1 G/3ML
INJECTION, POWDER, FOR SOLUTION INTRAMUSCULAR; INTRAVENOUS PRN
Status: DISCONTINUED | OUTPATIENT
Start: 2023-01-12 | End: 2023-01-12 | Stop reason: SDUPTHER

## 2023-01-12 RX ORDER — ATORVASTATIN CALCIUM 40 MG/1
40 TABLET, FILM COATED ORAL DAILY
Status: DISCONTINUED | OUTPATIENT
Start: 2023-01-13 | End: 2023-01-13 | Stop reason: HOSPADM

## 2023-01-12 RX ORDER — OXYCODONE HYDROCHLORIDE 10 MG/1
10 TABLET ORAL PRN
Status: DISCONTINUED | OUTPATIENT
Start: 2023-01-12 | End: 2023-01-12 | Stop reason: HOSPADM

## 2023-01-12 RX ORDER — PROPOFOL 10 MG/ML
INJECTION, EMULSION INTRAVENOUS PRN
Status: DISCONTINUED | OUTPATIENT
Start: 2023-01-12 | End: 2023-01-12 | Stop reason: SDUPTHER

## 2023-01-12 RX ORDER — SODIUM CHLORIDE 9 MG/ML
INJECTION, SOLUTION INTRAVENOUS PRN
Status: DISCONTINUED | OUTPATIENT
Start: 2023-01-12 | End: 2023-01-12 | Stop reason: HOSPADM

## 2023-01-12 RX ORDER — MIDAZOLAM HYDROCHLORIDE 1 MG/ML
INJECTION INTRAMUSCULAR; INTRAVENOUS PRN
Status: DISCONTINUED | OUTPATIENT
Start: 2023-01-12 | End: 2023-01-12 | Stop reason: SDUPTHER

## 2023-01-12 RX ORDER — FENTANYL CITRATE 50 UG/ML
INJECTION, SOLUTION INTRAMUSCULAR; INTRAVENOUS PRN
Status: DISCONTINUED | OUTPATIENT
Start: 2023-01-12 | End: 2023-01-12 | Stop reason: SDUPTHER

## 2023-01-12 RX ORDER — SODIUM CHLORIDE 0.9 % (FLUSH) 0.9 %
5-40 SYRINGE (ML) INJECTION PRN
Status: DISCONTINUED | OUTPATIENT
Start: 2023-01-12 | End: 2023-01-13 | Stop reason: HOSPADM

## 2023-01-12 RX ORDER — ONDANSETRON 2 MG/ML
INJECTION INTRAMUSCULAR; INTRAVENOUS PRN
Status: DISCONTINUED | OUTPATIENT
Start: 2023-01-12 | End: 2023-01-12 | Stop reason: SDUPTHER

## 2023-01-12 RX ORDER — OXYCODONE HYDROCHLORIDE 5 MG/1
5 TABLET ORAL EVERY 4 HOURS PRN
Status: DISCONTINUED | OUTPATIENT
Start: 2023-01-12 | End: 2023-01-13 | Stop reason: HOSPADM

## 2023-01-12 RX ORDER — DEXAMETHASONE SODIUM PHOSPHATE 4 MG/ML
INJECTION, SOLUTION INTRA-ARTICULAR; INTRALESIONAL; INTRAMUSCULAR; INTRAVENOUS; SOFT TISSUE PRN
Status: DISCONTINUED | OUTPATIENT
Start: 2023-01-12 | End: 2023-01-12 | Stop reason: SDUPTHER

## 2023-01-12 RX ORDER — SERTRALINE HYDROCHLORIDE 25 MG/1
25 TABLET, FILM COATED ORAL DAILY
Status: DISCONTINUED | OUTPATIENT
Start: 2023-01-13 | End: 2023-01-13 | Stop reason: HOSPADM

## 2023-01-12 RX ORDER — MORPHINE SULFATE 4 MG/ML
4 INJECTION, SOLUTION INTRAMUSCULAR; INTRAVENOUS
Status: DISCONTINUED | OUTPATIENT
Start: 2023-01-12 | End: 2023-01-13 | Stop reason: HOSPADM

## 2023-01-12 RX ORDER — OXYCODONE HYDROCHLORIDE 10 MG/1
10 TABLET ORAL EVERY 4 HOURS PRN
Status: DISCONTINUED | OUTPATIENT
Start: 2023-01-12 | End: 2023-01-13 | Stop reason: HOSPADM

## 2023-01-12 RX ADMIN — OXYCODONE 5 MG: 5 TABLET ORAL at 21:54

## 2023-01-12 RX ADMIN — Medication 100 MG: at 19:17

## 2023-01-12 RX ADMIN — SODIUM CHLORIDE: 9 INJECTION, SOLUTION INTRAVENOUS at 21:50

## 2023-01-12 RX ADMIN — CEFAZOLIN SODIUM 2 G: 1 INJECTION, POWDER, FOR SOLUTION INTRAMUSCULAR; INTRAVENOUS at 19:23

## 2023-01-12 RX ADMIN — DEXAMETHASONE SODIUM PHOSPHATE 8 MG: 4 INJECTION, SOLUTION INTRAMUSCULAR; INTRAVENOUS at 19:27

## 2023-01-12 RX ADMIN — FENTANYL CITRATE 100 MCG: 50 INJECTION INTRAMUSCULAR; INTRAVENOUS at 19:15

## 2023-01-12 RX ADMIN — PROPOFOL 200 MG: 10 INJECTION, EMULSION INTRAVENOUS at 19:17

## 2023-01-12 RX ADMIN — Medication 10 ML: at 21:54

## 2023-01-12 RX ADMIN — ONDANSETRON 4 MG: 2 INJECTION INTRAMUSCULAR; INTRAVENOUS at 19:27

## 2023-01-12 RX ADMIN — MIDAZOLAM 2 MG: 1 INJECTION INTRAMUSCULAR; INTRAVENOUS at 19:15

## 2023-01-12 ASSESSMENT — PAIN DESCRIPTION - DESCRIPTORS: DESCRIPTORS: DISCOMFORT

## 2023-01-12 ASSESSMENT — PAIN SCALES - GENERAL
PAINLEVEL_OUTOF10: 0
PAINLEVEL_OUTOF10: 5
PAINLEVEL_OUTOF10: 0
PAINLEVEL_OUTOF10: 0

## 2023-01-12 ASSESSMENT — PAIN DESCRIPTION - LOCATION: LOCATION: VAGINA

## 2023-01-12 ASSESSMENT — ENCOUNTER SYMPTOMS: SHORTNESS OF BREATH: 0

## 2023-01-12 ASSESSMENT — PAIN - FUNCTIONAL ASSESSMENT
PAIN_FUNCTIONAL_ASSESSMENT: ACTIVITIES ARE NOT PREVENTED
PAIN_FUNCTIONAL_ASSESSMENT: NONE - DENIES PAIN

## 2023-01-12 ASSESSMENT — PAIN DESCRIPTION - ORIENTATION: ORIENTATION: INNER

## 2023-01-12 NOTE — ED PROVIDER NOTES
When I went to evaluate the patient Dr. Kary Kilgore was in the room. This is a postop patient of hers and she is taking her back to surgery. She stated that the ED physician does not need to evaluate the patient. This patient was not evaluated by an ED physician. Impressions:  1.  Complication of procedure, initial encounter           Anita Hdez DO  01/12/23 8423

## 2023-01-12 NOTE — H&P
Reason for Consult: vaginal bleeding    History of Present Illness: Jossue Matta is a 47 y.o. female s/p uncomplicated TVT sling . She was seen in the office this morning doing fine. This afternoon, she developed significant vaginal bleeding. On exam, there is active pulsatile bleeding noted from the vaginal incision.       Past Medical History:   Past Medical History:   Diagnosis Date    Arthritis     Cancer ([address]     basal-skin    Cerebral artery occlusion with cerebral infarction ([address]     \"mini stroke\"    Depression     GERD (gastroesophageal reflux disease)     History of prolapse of bladder     Hyperlipidemia     Implantable loop recorder present     Wears glasses        Past Surgical History:  Past Surgical History:   Procedure Laterality Date     SECTION      x1    CHOLECYSTECTOMY      COLONOSCOPY  2020    Dr. Glenn Wagner    COLONOSCOPY N/A 2020    COLONOSCOPY performed by Bud Barajas MD at [address] Bilateral     INSERTABLE CARDIAC MONITOR      loop recorder    URETHRAL SURGERY N/A 2022    CYSTOSCOPY TRANS VAGINAL TAPE performed by Jocelynn Velásquez MD at [facility]      removed fibroid       Social History:  Social History     Socioeconomic History    Marital status:      Spouse name: Not on file    Number of children: Not on file    Years of education: Not on file    Highest education level: Not on file   Occupational History    Not on file   Tobacco Use    Smoking status: Never    Smokeless tobacco: Never   Vaping Use    Vaping Use: Never used   Substance and Sexual Activity    Alcohol use: Yes     Comment: socially    Drug use: Never    Sexual activity: Yes     Partners: Male   Other Topics Concern    Not on file   Social History Narrative    Not on file     Social Determinants of Health     Financial Resource Strain: Low Risk     Difficulty of Paying Living Expenses: Not hard at all   Food Insecurity: No Food Insecurity    Worried About Running Out of Food in the Last Year: Never true    Ran Out of Food in the Last Year: Never true   Transportation Needs: Not on file   Physical Activity: Not on file   Stress: Not on file   Social Connections: Not on file   Intimate Partner Violence: Not on file   Housing Stability: Not on file       Family History:  Family History   Problem Relation Age of Onset    Breast Cancer Mother 76    Diabetes Father     High Blood Pressure Father     Heart Disease Father     Other Father         CHF    Stroke Father        Meds:   No current facility-administered medications for this encounter.     Vitals:  /86   Pulse 91   Temp 97.7 °F (36.5 °C) (Oral)   Resp 18   SpO2 96%   No intake or output data in the 24 hours ending 01/12/23 1811    Review of Systems:  10 Systems were reviewed and negative except as in HPI      Physical Exam:  General Appearance: Alert and oriented, cooperative, no distress, appears stated age  Head: Normocephalic, without obvious abnormality, atraumatic  Back: no CVA tenderness  Lungs: respirations unlabored, no wheezing  Heart: Regular rate and rhythm, no lower extremity edema noted  Abdomen: Soft, non-tender, non-distended, no masses  Skin: Skin color, texture, turgor normal, no rashes or lesions  Neurologic: no gross deficits  Female :   Bladder is non tender  No CVA tenderness    Active pulsatile bleeding from TVT incision site    Labs:  CBC   Lab Results   Component Value Date/Time    WBC 6.7 12/19/2022 02:48 PM    RBC 4.66 12/19/2022 02:48 PM    HGB 13.4 12/19/2022 02:48 PM    HCT 40.4 12/19/2022 02:48 PM    MCV 86.5 12/19/2022 02:48 PM    MCH 28.7 12/19/2022 02:48 PM    MCHC 33.2 12/19/2022 02:48 PM    RDW 13.9 12/19/2022 02:48 PM     12/19/2022 02:48 PM    MPV 9.0 12/19/2022 02:48 PM     BMP   Lab Results   Component Value Date/Time     12/19/2022 02:48 PM    K 3.7 12/19/2022 02:48 PM K 4.0 08/28/2022 04:03 PM     12/19/2022 02:48 PM    CO2 27 12/19/2022 02:48 PM    BUN 11 12/19/2022 02:48 PM    CREATININE 0.7 12/19/2022 02:48 PM    GLUCOSE 96 12/19/2022 02:48 PM    CALCIUM 9.1 12/19/2022 02:48 PM       Urinalysis:   Lab Results   Component Value Date/Time    COLORU Yellow 10/01/2022 02:00 PM    GLUCOSEU Negative 10/01/2022 02:00 PM    BLOODU TRACE-LYSED 10/01/2022 02:00 PM    NITRU Negative 10/01/2022 02:00 PM    LEUKOCYTESUR Negative 10/01/2022 02:00 PM       Imaging: NA   Impression/Plan: vaginal bleeding 2 weeks post TVT  - plan urgent return to OR for evacuation of hematoma  - stat labs  - vagina was packed with packing gauze while we wait for Dhiraj Rodriguez MD 1/12/20236:11 PM

## 2023-01-13 VITALS
SYSTOLIC BLOOD PRESSURE: 105 MMHG | TEMPERATURE: 98.1 F | OXYGEN SATURATION: 91 % | WEIGHT: 293 LBS | HEART RATE: 70 BPM | RESPIRATION RATE: 18 BRPM | BODY MASS INDEX: 41.95 KG/M2 | HEIGHT: 70 IN | DIASTOLIC BLOOD PRESSURE: 67 MMHG

## 2023-01-13 LAB
HCT VFR BLD CALC: 38.8 % (ref 36–48)
HEMOGLOBIN: 12.7 G/DL (ref 12–16)

## 2023-01-13 PROCEDURE — 6360000002 HC RX W HCPCS: Performed by: UROLOGY

## 2023-01-13 PROCEDURE — 85018 HEMOGLOBIN: CPT

## 2023-01-13 PROCEDURE — 94760 N-INVAS EAR/PLS OXIMETRY 1: CPT

## 2023-01-13 PROCEDURE — 36415 COLL VENOUS BLD VENIPUNCTURE: CPT

## 2023-01-13 PROCEDURE — 6370000000 HC RX 637 (ALT 250 FOR IP): Performed by: UROLOGY

## 2023-01-13 PROCEDURE — 85014 HEMATOCRIT: CPT

## 2023-01-13 PROCEDURE — G0378 HOSPITAL OBSERVATION PER HR: HCPCS

## 2023-01-13 PROCEDURE — 2580000003 HC RX 258: Performed by: UROLOGY

## 2023-01-13 RX ORDER — CETIRIZINE HYDROCHLORIDE 10 MG/1
10 TABLET ORAL DAILY PRN
COMMUNITY

## 2023-01-13 RX ADMIN — ATORVASTATIN CALCIUM 40 MG: 40 TABLET, FILM COATED ORAL at 09:16

## 2023-01-13 RX ADMIN — CEFTRIAXONE 2000 MG: 2 INJECTION, POWDER, FOR SOLUTION INTRAMUSCULAR; INTRAVENOUS at 09:45

## 2023-01-13 RX ADMIN — SODIUM CHLORIDE: 9 INJECTION, SOLUTION INTRAVENOUS at 10:53

## 2023-01-13 RX ADMIN — Medication 10 ML: at 09:43

## 2023-01-13 RX ADMIN — MORPHINE SULFATE 2 MG: 2 INJECTION, SOLUTION INTRAMUSCULAR; INTRAVENOUS at 00:35

## 2023-01-13 RX ADMIN — PANTOPRAZOLE SODIUM 40 MG: 40 TABLET, DELAYED RELEASE ORAL at 06:02

## 2023-01-13 RX ADMIN — SODIUM CHLORIDE: 9 INJECTION, SOLUTION INTRAVENOUS at 00:29

## 2023-01-13 RX ADMIN — SERTRALINE 25 MG: 25 TABLET, FILM COATED ORAL at 09:16

## 2023-01-13 RX ADMIN — Medication 3 MG: at 00:35

## 2023-01-13 RX ADMIN — BISACODYL 5 MG: 5 TABLET, COATED ORAL at 09:16

## 2023-01-13 RX ADMIN — OXYCODONE HYDROCHLORIDE AND ACETAMINOPHEN 500 MG: 500 TABLET ORAL at 09:16

## 2023-01-13 RX ADMIN — OXYCODONE 5 MG: 5 TABLET ORAL at 06:12

## 2023-01-13 ASSESSMENT — PAIN DESCRIPTION - ONSET
ONSET: ON-GOING
ONSET: ON-GOING

## 2023-01-13 ASSESSMENT — PAIN DESCRIPTION - LOCATION
LOCATION: VAGINA

## 2023-01-13 ASSESSMENT — PAIN SCALES - GENERAL
PAINLEVEL_OUTOF10: 3
PAINLEVEL_OUTOF10: 1
PAINLEVEL_OUTOF10: 4
PAINLEVEL_OUTOF10: 0
PAINLEVEL_OUTOF10: 3

## 2023-01-13 ASSESSMENT — PAIN DESCRIPTION - PAIN TYPE
TYPE: SURGICAL PAIN
TYPE: SURGICAL PAIN

## 2023-01-13 ASSESSMENT — PAIN DESCRIPTION - DESCRIPTORS
DESCRIPTORS: DISCOMFORT

## 2023-01-13 ASSESSMENT — PAIN DESCRIPTION - FREQUENCY
FREQUENCY: INTERMITTENT
FREQUENCY: INTERMITTENT

## 2023-01-13 ASSESSMENT — PAIN DESCRIPTION - ORIENTATION
ORIENTATION: INNER
ORIENTATION: INNER

## 2023-01-13 ASSESSMENT — PAIN - FUNCTIONAL ASSESSMENT
PAIN_FUNCTIONAL_ASSESSMENT: ACTIVITIES ARE NOT PREVENTED
PAIN_FUNCTIONAL_ASSESSMENT: PREVENTS OR INTERFERES SOME ACTIVE ACTIVITIES AND ADLS

## 2023-01-13 NOTE — PROGRESS NOTES
Pt   just came and picked her up. Pt ambulated independently with all personal belongings with . No distress or discomfort noted upon discharge.

## 2023-01-13 NOTE — PROGRESS NOTES
Pt arrived to room 4251 from ER without incident. Is alert and responsive. Oriented to room and call light. Bed in low locked position. Gordillo in place. Draining yellow urine. Vaginal area without bleeding at this time. States some discomfort  but tolerable. Call light within reach.

## 2023-01-13 NOTE — OP NOTE
[address] [place] [address]                                OPERATIVE REPORT    PATIENT NAME: Ana Nagel                       :        1970  MED REC NO:   [id number]                          ROOM:       [facility]  ACCOUNT NO:   [de-identified]                           ADMIT DATE: 2023  PROVIDER:     Mandy Mitchell MD    DATE OF PROCEDURE:  2023    PREOPERATIVE DIAGNOSIS:  Vaginal bleeding after a TVT sling. POSTOPERATIVE DIAGNOSIS:  Vaginal bleeding after a TVT sling. OPERATION PERFORMED:  Exploration of vaginal wound. SURGEON:  Mandy Mtichell MD    ANESTHESIA:  General.    COMPLICATIONS:  None. BLOOD LOSS:  Less than 50 mL. INDICATIONS:  A 31-year-old female approximately 2 weeks postop from an  uncomplicated TVT sling. She was seen in the office today for her  postop visit doing fine. At that time exam was unremarkable. This  afternoon she felt something pop and then developed significant vaginal  bleeding. In the ER she had what appeared to be pulsatile bleeding from  the incision. She was then taken back urgently to the operating room  for exploration of this wound and reclosure. OPERATIVE PROCEDURE:  She was given Ancef. She was taken to the  operative suite. She moved onto the table. Anesthesia was induced. Her position was changed to the lithotomy position. The packing that  had been placed in the emergency department was removed. No active  bleeding was seen at this point. Her genitalia were then prepped and  draped. An 18-Nepalese Gordillo catheter was placed. The incision was then  evaluated. It appeared that the incision had spontaneously opened. The  whole incision was opened. There was mild bleeding from the wound. I  irrigated it copiously with antibiotic solution. I then closed the  incision with a running 2-0 Vicryl stitch.   I reinforced the incision  then with several interrupted Vicryl stitches. I then packed the  incision with packing gauze. The patient was then awakened and  transferred to Recovery having tolerated the procedure well. We will  monitor her overnight and remove the packing in the morning and plan to  discharge her home assuming the bleeding does not recur.         Stefania Bullard MD    D: 01/12/2023 20:05:08       T: 01/12/2023 20:08:45     [email or web address]  Job#: [id number]     Doc#: [id number]    CC:

## 2023-01-13 NOTE — PROGRESS NOTES
No events overnight    VSS  Good UO    Abd soft  No vaginal bleeding    Packing removed this am    A:  POD#1 repair of vaginal wound dehiscence    Plan:  remove robledo  Home around lunch time assuming no more vaginal bleeding    Follow up in 2 weeks

## 2023-01-13 NOTE — BRIEF OP NOTE
Brief Postoperative Note      Patient: Judy Shoulder  YOB: 1970  MRN: [id number]    Date of Procedure: 1/12/2023    Pre-Op Diagnosis: vaginal bleeding [N93.9]    Post-Op Diagnosis: Same       Procedure(s):  VAGINAL REPAIR    Surgeon(s):  Indu Hinds MD    Assistant:  Surgical Assistant: Thomas Pedersen    Anesthesia: General    Estimated Blood Loss (mL): less than 50     Complications: None    Specimens:   * No specimens in log *    Implants:  * No implants in log *      Drains:   [REMOVED] Urinary Catheter 12/29/22 (Removed)   Urine Color Yellow 12/29/22 1532   Output (mL) 25 mL 12/29/22 1532       Findings: no active bleeding identified but incision appeared to have opened.   Wound was washed out and closed    Electronically signed by Indu Hinds MD on 1/12/2023 at 7:47 PM

## 2023-01-13 NOTE — ANESTHESIA POSTPROCEDURE EVALUATION
Department of Anesthesiology  Postprocedure Note    Patient: Jessa Espinosa  MRN: [id number]  YOB: 1970  Date of evaluation: 2023      Procedure Summary     Date: 23 Room / Location: [address] [place] [address]    Anesthesia Start:  Anesthesia Stop:     Procedure: Repair of Dehisced vaginal wound (Vagina ) Diagnosis:       Abnormal vaginal bleeding      (Abnormal vaginal bleeding [N93.9])    Surgeons: Jonna Green MD Responsible Provider: Lanice Apgar, MD    Anesthesia Type: general ASA Status: 3          Anesthesia Type: No value filed.     Johanna Phase I: Johanna Score: 9    Johanna Phase II:        Anesthesia Post Evaluation    Patient location during evaluation: PACU  Level of consciousness: awake and alert  Airway patency: patent  Nausea & Vomiting: no nausea and no vomiting  Complications: no  Cardiovascular status: blood pressure returned to baseline  Respiratory status: acceptable  Hydration status: euvolemic  Comments: Postoperative Anesthesia Note    Name:    Jessa Espinosa  MRN:      [id number]    Patient Vitals in the past 12 hrs:  23, BP:111/67, Temp:97.4 °F (36.3 °C), Temp src:Oral, Pulse:66, Resp:19, SpO2:98 %  23, Height:5' 10\" (1.778 m), Weight:(!) 304 lb 14.3 oz (138.3 kg)  23, BP:113/71, Pulse:69, Resp:15, SpO2:99 %  23, BP:(!) 108/93, Pulse:68, Resp:15, SpO2:100 %  23, BP:(!) 118/43, Pulse:68, Resp:14, SpO2:94 %  23, BP:(!) 112/58, Pulse:70, Resp:17, SpO2:95 %  23, BP:121/69, Pulse:73, Resp:16, SpO2:(!) 88 %  23, BP:118/68, Pulse:77, Resp:17, SpO2:91 %  23, BP:115/63  23, BP:130/70, Pulse:78, Resp:18, SpO2:96 %  23 1732, BP:138/86, Temp:97.7 °F (36.5 °C), Temp src:Oral, Pulse:91, Resp:18, SpO2:96 %     LABS:    CBC  Lab Results       Component                Value               Date/Time                  WBC 9.2                 01/12/2023 06:21 PM        HGB                      13.7                01/12/2023 06:21 PM        HCT                      42.5                01/12/2023 06:21 PM        PLT                      323                 01/12/2023 06:21 PM   RENAL  Lab Results       Component                Value               Date/Time                  NA                       141                 01/12/2023 06:21 PM        K                        3.9                 01/12/2023 06:21 PM        CL                       104                 01/12/2023 06:21 PM        CO2                      27                  01/12/2023 06:21 PM        BUN                      14                  01/12/2023 06:21 PM        CREATININE               0.7                 01/12/2023 06:21 PM        GLUCOSE                  99                  01/12/2023 06:21 PM   COAGS  Lab Results       Component                Value               Date/Time                  PROTIME                  13.0                12/19/2022 02:48 PM        INR                      0.99                12/19/2022 02:48 PM        APTT                     31.9                12/19/2022 02:48 PM     Intake & Output:  @[id number]@    Nausea & Vomiting:  No    Level of Consciousness:  Awake    Pain Assessment:  Adequate analgesia    Anesthesia Complications:  No apparent anesthetic complications    SUMMARY      Vital signs stable  OK to discharge from Stage I post anesthesia care.   Care transferred from Anesthesiology department on discharge from perioperative area

## 2023-01-13 NOTE — ANESTHESIA PRE PROCEDURE
[facility] [address] Department of Anesthesiology  Pre-Anesthesia Evaluation/Consultation       Name:  Zeferino Alfaro  : 1970  Age:  47 y.o.                                            MRN:  [id number]  Date: 2023           Surgeon: Pietro Bhagat):  Roselia Matthew MD    Procedure: Procedure(s):  COLONOSCOPY     No Known Allergies  Patient Active Problem List   Diagnosis    Hyperlipidemia    Depression    Gilbert's disease    Gastroesophageal reflux disease with esophagitis    History of TIA (transient ischemic attack)    Morbid obesity with BMI of 40.0-44.9, adult ([address]    Metabolic syndrome    Numbness    Left cervical radiculopathy    Pharyngeal abscess    Status post placement of implantable loop recorder    Vaginal bleeding     Past Medical History:   Diagnosis Date    Arthritis     [facility] ([facility]     basal-skin    Cerebral artery occlusion with cerebral infarction ([facility]     \"mini stroke\"    Depression     GERD (gastroesophageal reflux disease)     History of prolapse of bladder     Hyperlipidemia     Implantable loop recorder present     Wears glasses      Past Surgical History:   Procedure Laterality Date     SECTION      x1    CHOLECYSTECTOMY      COLONOSCOPY  2020    Dr. Ranulfo Jones N/A 2020    COLONOSCOPY performed by Robert Tate MD at [facility] Bilateral     [address]      loop recorder    URETHRAL SURGERY N/A 2022    CYSTOSCOPY TRANS VAGINAL TAPE performed by Roselia Matthew MD at [address] [place] [address]      removed fibroid     Social History     Tobacco Use    Smoking status: Never    Smokeless tobacco: Never   Vaping Use    Vaping Use: Never used   Substance Use Topics    Alcohol use: Yes     Comment: socially    Drug use: Never     Medications  Current Facility-Administered Medications on File Prior to Visit   Medication Dose Route Frequency Provider Last Rate Last Admin    cefTRIAXone (ROCEPHIN) 1,000 mg in dextrose 5 % 50 mL IVPB mini-bag  1,000 mg IntraVENous Once Omari Hooper MD         Current Outpatient Medications on File Prior to Visit   Medication Sig Dispense Refill    Cyanocobalamin (VITAMIN B-12 IJ) Inject 1,000 mcg as directed every 30 days      atorvastatin (LIPITOR) 40 MG tablet TAKE 1 TABLET BY MOUTH AT  NIGHT 90 tablet 3    albuterol sulfate HFA (PROVENTIL HFA) 108 (90 Base) MCG/ACT inhaler Inhale 2 puffs into the lungs every 6 hours as needed for Wheezing 18 g 3    pantoprazole (PROTONIX) 40 MG tablet Take 1 tablet by mouth every morning (before breakfast) 30 tablet 0    Semaglutide,0.25 or 0.5MG/DOS, (OZEMPIC, 0.25 OR 0.5 MG/DOSE,) 2 MG/1.5ML SOPN Inject 0.5 mg into the skin once a week Sample from PCP      melatonin 3 MG TABS tablet Take 3 mg by mouth nightly as needed (sleep)       sertraline (ZOLOFT) 50 MG tablet TAKE 1 TABLET BY MOUTH IN  THE EVENING 90 tablet 3    Ascorbic Acid (VITAMIN C PO) Take 500 mg by mouth daily       aspirin 81 MG chewable tablet Take 81 mg by mouth daily       No current facility-administered medications for this visit.      Current Outpatient Medications   Medication Sig Dispense Refill    Cyanocobalamin (VITAMIN B-12 IJ) Inject 1,000 mcg as directed every 30 days      atorvastatin (LIPITOR) 40 MG tablet TAKE 1 TABLET BY MOUTH AT  NIGHT 90 tablet 3    albuterol sulfate HFA (PROVENTIL HFA) 108 (90 Base) MCG/ACT inhaler Inhale 2 puffs into the lungs every 6 hours as needed for Wheezing 18 g 3    pantoprazole (PROTONIX) 40 MG tablet Take 1 tablet by mouth every morning (before breakfast) 30 tablet 0    Semaglutide,0.25 or 0.5MG/DOS, (OZEMPIC, 0.25 OR 0.5 MG/DOSE,) 2 MG/1.5ML SOPN Inject 0.5 mg into the skin once a week Sample from PCP      melatonin 3 MG TABS tablet Take 3 mg by mouth nightly as needed (sleep)       sertraline (ZOLOFT) 50 MG tablet TAKE 1 TABLET BY MOUTH IN  THE EVENING 90 tablet 3    Ascorbic Acid (VITAMIN C PO) Take 500 mg by mouth daily       aspirin 81 MG chewable tablet Take 81 mg by mouth daily       Facility-Administered Medications Ordered in Other Visits   Medication Dose Route Frequency Provider Last Rate Last Admin    cefTRIAXone (ROCEPHIN) 1,000 mg in dextrose 5 % 50 mL IVPB mini-bag  1,000 mg IntraVENous Once Melinda Cotton MD         Vital Signs (Current)   There were no vitals filed for this visit. Vital Signs Statistics (for past 48 hrs)     Temp  Av.7 °F (36.5 °C)  Min: 97.7 °F (36.5 °C)   Min taken time: 23  Max: 97.7 °F (36.5 °C)   Max taken time: 23  Pulse  Av.5  Min: 66   Min taken time: 23  Max: 91   Max taken time: 23  Resp  Av  Min: 18   Min taken time: 23  Max: 18   Max taken time: 23  BP  Min: 130/70   Min taken time: 23  Max: 138/86   Max taken time: 23  MAP (mmHg)  Av  Min: 85   Min taken time: 23  Max: 85   Max taken time: 23  SpO2  Av %  Min: 96 %   Min taken time: 23  Max: 96 %   Max taken time: 23    BP Readings from Last 3 Encounters:   23 130/70   22 106/64   22 118/78     BMI  There is no height or weight on file to calculate BMI. Estimated body mass index is 43.42 kg/m² as calculated from the following:    Height as of 22: 5' 10\" (1.778 m). Weight as of 22: 302 lb 9.3 oz (137.2 kg).     CBC   Lab Results   Component Value Date/Time    WBC 6.7 2022 02:48 PM    RBC 4.66 2022 02:48 PM    HGB 13.4 2022 02:48 PM    HCT 40.4 2022 02:48 PM    MCV 86.5 2022 02:48 PM    RDW 13.9 2022 02:48 PM     2022 02:48 PM     CMP    Lab Results   Component Value Date/Time     2023 06:21 PM    K 3.9 2023 06:21 PM     2023 06:21 PM    CO2 27 2023 06:21 PM    BUN 14 01/12/2023 06:21 PM    CREATININE 0.7 01/12/2023 06:21 PM    GFRAA >60 10/01/2022 02:05 PM    AGRATIO 1.5 12/19/2022 02:48 PM    LABGLOM >60 01/12/2023 06:21 PM    GLUCOSE 99 01/12/2023 06:21 PM    PROT 7.2 12/19/2022 02:48 PM    PROT 7.6 10/04/2012 09:16 AM    CALCIUM 10.1 01/12/2023 06:21 PM    BILITOT 2.5 12/19/2022 02:48 PM    ALKPHOS 76 12/19/2022 02:48 PM    AST 17 12/19/2022 02:48 PM    ALT 18 12/19/2022 02:48 PM     BMP    Lab Results   Component Value Date/Time     01/12/2023 06:21 PM    K 3.9 01/12/2023 06:21 PM     01/12/2023 06:21 PM    CO2 27 01/12/2023 06:21 PM    BUN 14 01/12/2023 06:21 PM    CREATININE 0.7 01/12/2023 06:21 PM    CALCIUM 10.1 01/12/2023 06:21 PM    GFRAA >60 10/01/2022 02:05 PM    LABGLOM >60 01/12/2023 06:21 PM    GLUCOSE 99 01/12/2023 06:21 PM     POCGlucose  Recent Labs     01/12/23  1821   GLUCOSE 99      Coags    Lab Results   Component Value Date/Time    PROTIME 13.0 12/19/2022 02:48 PM    INR 0.99 12/19/2022 02:48 PM    APTT 31.9 12/19/2022 02:48 PM     HCG (If Applicable)   Lab Results   Component Value Date    PREGTESTUR Negative 07/06/2020      ABGs No results found for: PHART, PO2ART, SQH0XVK, WTD6EJU, BEART, J9RJQDOK   Type & Screen (If Applicable)  No results found for: LABABO, LABRH                         BMI: Wt Readings from Last 3 Encounters:       NPO Status:                          Anesthesia Evaluation  Patient summary reviewed and Nursing notes reviewed no history of anesthetic complications:   Airway: Mallampati: III  TM distance: >3 FB   Neck ROM: full     Dental:          Pulmonary:Negative Pulmonary ROS       (-) COPD, asthma and shortness of breath                           Cardiovascular:  Exercise tolerance: good (>4 METS),       (-) hypertension, valvular problems/murmurs, past MI, CAD, CABG/stent, dysrhythmias,  angina and no hyperlipidemia    ECG reviewed      Echocardiogram reviewed               ROS comment: EF 55 Neuro/Psych:   (+) CVA:, neuromuscular disease:, TIA (patient denies though in history), psychiatric history:depression/anxiety    (-) seizures           GI/Hepatic/Renal:   (+) GERD:, bowel prep, morbid obesity     (-) PUD, liver disease and no renal disease       Endo/Other: Negative Endo/Other ROS       (-) diabetes mellitus               Abdominal:             Vascular: negative vascular ROS. Other Findings:             Anesthesia Plan      general     ASA 3     (I discussed with the patient the risks and benefits of PIV, general anesthesia, IV Narcotics, PACU. All questions were answered the patient agrees with the plan.)  Induction: intravenous. MIPS: Postoperative opioids intended and Prophylactic antiemetics administered. Anesthetic plan and risks discussed with patient and spouse. Plan discussed with CRNA. This pre-anesthesia assessment may be used as a history and physical.    DOS STAFF ADDENDUM:    Pt seen and examined, chart reviewed (including anesthesia, drug and allergy history). No interval changes to history and physical examination. Anesthetic plan, risks, benefits, alternatives, and personnel involved discussed with patient. Questions and concerns addressed. Patient(family) verbalized an understanding and agrees to proceed.       Sherrill Zuñiga MD  January 12, 2023  7:07 PM

## 2023-01-13 NOTE — PLAN OF CARE
Problem: Chronic Conditions and Co-morbidities  Goal: Patient's chronic conditions and co-morbidity symptoms are monitored and maintained or improved  Outcome: Progressing     Problem: Discharge Planning  Goal: Discharge to home or other facility with appropriate resources  Outcome: Progressing     Problem: Pain  Goal: Verbalizes/displays adequate comfort level or baseline comfort level  Outcome: Progressing     Problem: ABCDS Injury Assessment  Goal: Absence of physical injury  Outcome: Progressing  Flowsheets (Taken 1/13/2023 8414)  Absence of Physical Injury: Implement safety measures based on patient assessment

## 2023-01-13 NOTE — PROGRESS NOTES
Patient alert and oriented X4. Patient states 5 out of 10 vaginal pain. Followed PRN pain medication protocol. See MAR.  Electronically signed by Alberto Robles RN on 1/12/2023 at 9:55 PM

## 2023-01-13 NOTE — PROGRESS NOTES
Clinical Pharmacy Note  Ceftriaxone Dose Adjustment    Ezio Griffiths is receiving ceftriaxone for UTI.  The dose has been adjusted to 2000mg q24h per protocol,     Wt Readings from Last 1 Encounters:   01/13/23 (!) 304 lb 14.3 oz (138.3 kg)         Ceftriaxone Empiric Dosing Table    Indication Weight < 100 kg** Weight ? 100 kg*   Bacteremia      Non-pneumococcal      Pneumococcal     2 g daily  2 g Q12H    2 g daily  2 g Q12H   Community Acquired PNA      Non- critically ill      Critically ill   1 g daily  2 g daily   2 g daily  2 g daily   CNS Infection 2 g Q12H 2 g Q12H   COPD exacerbation 1 g daily 2 g daily   Diabetic Foot Infection or SSTI 1 g daily 2 g daily   Endocarditis      Enterococcus faecalis (in         combination with ampicillin)        Strep sp., gram-negative         organisms     2 g Q12H      2 g daily   2 g Q12H      2 g daily   Intra-abdominal infection 1 g daily 2 g daily   Osteomyelitis  Discitis  Prosthetic Joint infection    Septic arthritis 2 g daily 2 g daily   Urinary Tract Infection     Uncomplicated     Complicated   1 g daily  2 g daily   2 g daily  2 g daily     [facility], 1/13/2023 7:39 AM

## 2023-01-13 NOTE — CARE COORDINATION
DC order placed prior to CM seeing. Pt verified address, denies any needs & has a ride home.     CASE MANAGEMENT DISCHARGE SUMMARY:    DISCHARGE DATE: 01/13/23    DISCHARGED TO HOME     TRANSPORTATION: family      Electronically signed by Clover Kirkpatrick RN on 1/13/2023 at 1:04 PM

## 2023-01-13 NOTE — DISCHARGE INSTRUCTIONS
no heavy lifting, pushing, pulling for 3 weeks, no intercourse or items inserted into vagina for 1 month

## 2023-01-13 NOTE — PROGRESS NOTES
4 Eyes Skin Assessment     NAME:  Osmar Gardner  YOB: 1970  MEDICAL RECORD NUMBER:  [id number]    The patient is being assessed for  Admission    I agree that One RN have performed a thorough Head to Toe Skin Assessment on the patient. ALL assessment sites listed below have been assessed. Areas assessed by both nurses:    Head, Face, Ears, Shoulders, Back, Chest, Arms, Elbows, Hands, Sacrum. Buttock, Coccyx, Ischium, and Legs. Feet and Heels        Does the Patient have a Wound? No noted wound(s)- Has had vaginal  incision re-stitched and packed with gauze. Has some light redness to inner crevice of buttocks.  Birthmark on right buttock       Chandana Prevention initiated by RN: No   Wound Care Orders initiated by RN: No    Pressure Injury (Stage 3,4, Unstageable, DTI, NWPT, and Complex wounds) if present place referral order by RN under : No    New and Established Ostomies, if present place, referral order under : No      Nurse 1 eSignature: Electronically signed by Britta Bernal RN on 1/12/23 at 10:51 PM EST    **SHARE this note so that the co-signing nurse is able to place an eSignature**    Nurse 2 eSignature: Electronically signed by Bess Stack RN on 1/13/23 at 6:53 AM EST

## 2023-01-13 NOTE — PROGRESS NOTES
Medication Reconciliation    List of medications patient is currently taking is complete. Source of information: 1. Conversation with patient at bedside                                      2. EPIC records      Allergies  Patient has no known allergies.        Michelle De Souza [facility]   1/13/2023  9:14 AM

## 2023-01-13 NOTE — PROGRESS NOTES
Called report to LAURIE on [facility], who is ready to receive the patient. Waiting on the room to be cleaned.

## 2023-01-13 NOTE — DISCHARGE SUMMARY
Urology Discharge Summary      Patient Identification  Ashley Al is a 47 y.o. female. :  1970  Admit Date:  2023    Discharge date:   23                                  Disposition: home    Discharge Diagnoses:   Patient Active Problem List   Diagnosis    Hyperlipidemia    Depression    Gilbert's disease    Gastroesophageal reflux disease with esophagitis    History of TIA (transient ischemic attack)    Morbid obesity with BMI of 40.0-44.9, adult ([address]    Metabolic syndrome    Numbness    Left cervical radiculopathy    Pharyngeal abscess    Status post placement of implantable loop recorder    Vaginal bleeding       Surgery: repair of vaginal wound dehiscence    Activity:  no heavy lifting, pushing, pulling for 3 weeks, no intercourse or items inserted into vagina for 1 month    Condition on discharge: stable    Follow-up: With Dr. Mercy Pinto in 2 weeks    Hospital course: To OR for repair of vaginal wound dehiscence. Post operatively tolerating meals, was able to be weaned off oxygen, robledo catheter removed and spontaneously voiding. Very slight tinge blood on pad and will discharge home in stable condition. F/u with Dr. Mercy Pinto in 2 weeks.      Simon Tobin, APRN - CNP

## 2023-01-16 ENCOUNTER — TELEPHONE (OUTPATIENT)
Dept: FAMILY MEDICINE CLINIC | Age: 53
End: 2023-01-16

## 2023-01-16 NOTE — TELEPHONE ENCOUNTER
I spoke with patient to schedule hospital follow up for vaginal bleeding. Patient states she does not need to follow up right now with her pcp. She recently had surgery and  they think she had an infection and gave her antibiotics. Patient states she had follow up today and next week with [facility].

## 2023-01-17 LAB — URINE CULTURE, ROUTINE: NORMAL

## 2023-01-19 ENCOUNTER — NURSE ONLY (OUTPATIENT)
Dept: CARDIOLOGY CLINIC | Age: 53
End: 2023-01-19

## 2023-01-19 DIAGNOSIS — Z95.818 STATUS POST PLACEMENT OF IMPLANTABLE LOOP RECORDER: Primary | ICD-10-CM

## 2023-01-19 DIAGNOSIS — Z86.73 HISTORY OF TIA (TRANSIENT ISCHEMIC ATTACK): ICD-10-CM

## 2023-01-20 NOTE — PROGRESS NOTES
ILR for CVA.  We received a remote transmission from patient's monitor at home. Remote Linq report shows no recorded arrhythmias/ECGs.  PVC burden 0.1%. EP physician to review. We will continue to monitor remotely.     (End of 31-day monitoring period 1/19/23)

## 2023-02-06 ENCOUNTER — TELEPHONE (OUTPATIENT)
Dept: FAMILY MEDICINE CLINIC | Age: 53
End: 2023-02-06

## 2023-02-07 NOTE — TELEPHONE ENCOUNTER
I spoke with patient and let her know we do not have any samples of Ozempic right now but will try to call when some comes in.

## 2023-03-02 NOTE — PROGRESS NOTES
3/3/2023    Penelope Mahajan (:  1970) is a 47 y.o. female, here for evaluation of the following chief complaint(s):  Hyperlipidemia      ASSESSMENT/PLAN:     Diagnosis Orders   1. Mixed hyperlipidemia  Comprehensive Metabolic Panel    Lipid Panel    CMP lipids on statin cont      2. Metabolic syndrome  Hemoglobin A1C    a1c today LCIF advised      3. Morbid obesity with BMI of 40.0-44.9, adult ([facility]      we discuss ozempic use history insurer won't cover      4. Need for diphtheria-tetanus-pertussis (Tdap) vaccine  Tdap, ADACEL, (age 10y-63y), IM          Return in about 6 months (around 9/3/2023) for Well Adult, screen breast, Hyperlipidemia, Metabolic syndrome, Obesity, Hypothyroidism. An electronic signature was used to authenticate this note.     SUBJECTIVE/OBJECTIVE:  (NOTE : prior results listed below reviewed at this visit to assist in medical decision making.)    HPI / ROS    # Hyperlipidemia on statin amaya this no myalgias or weakness  Lab Results   Component Value Date    LDLCALC 104 (H) 2022    LDLDIRECT 122 (H) 10/10/2019      Lab Results   Component Value Date    ALT 18 2022    AST 17 2022    ALKPHOS 76 2022    BILITOT 2.5 (H) 2022      LIPIDS DUE              Wt Readings from Last 3 Encounters:   23 (!) 301 lb (136.5 kg)   23 (!) 304 lb 14.3 oz (138.3 kg)   22 (!) 302 lb 9.3 oz (137.2 kg)       BP Readings from Last 3 Encounters:   23 112/80   23 105/67   22 106/64       PHYSICAL EXAM  Vitals:    23 0828   BP: 112/80   Pulse: 90   Resp: 18   SpO2: 95%   Weight: (!) 301 lb (136.5 kg)     A&o  Neck no TMG no bruit  Car reg no MGR  Lungs cta  Ext no edema  Skin no jaundice  Eyes anicteric

## 2023-03-03 ENCOUNTER — OFFICE VISIT (OUTPATIENT)
Dept: FAMILY MEDICINE CLINIC | Age: 53
End: 2023-03-03
Payer: COMMERCIAL

## 2023-03-03 VITALS
OXYGEN SATURATION: 95 % | DIASTOLIC BLOOD PRESSURE: 80 MMHG | WEIGHT: 293 LBS | SYSTOLIC BLOOD PRESSURE: 112 MMHG | BODY MASS INDEX: 43.19 KG/M2 | RESPIRATION RATE: 18 BRPM | HEART RATE: 90 BPM

## 2023-03-03 DIAGNOSIS — Z23 NEED FOR DIPHTHERIA-TETANUS-PERTUSSIS (TDAP) VACCINE: ICD-10-CM

## 2023-03-03 DIAGNOSIS — E88.81 METABOLIC SYNDROME: ICD-10-CM

## 2023-03-03 DIAGNOSIS — F32.89 OTHER DEPRESSION: ICD-10-CM

## 2023-03-03 DIAGNOSIS — E66.01 MORBID OBESITY WITH BMI OF 40.0-44.9, ADULT (HCC): ICD-10-CM

## 2023-03-03 DIAGNOSIS — E78.2 MIXED HYPERLIPIDEMIA: Primary | ICD-10-CM

## 2023-03-03 LAB
A/G RATIO: 1.6 (ref 1.1–2.2)
ALBUMIN SERPL-MCNC: 4.4 G/DL (ref 3.4–5)
ALP BLD-CCNC: 77 U/L (ref 40–129)
ALT SERPL-CCNC: 20 U/L (ref 10–40)
ANION GAP SERPL CALCULATED.3IONS-SCNC: 12 MMOL/L (ref 3–16)
AST SERPL-CCNC: 18 U/L (ref 15–37)
BILIRUB SERPL-MCNC: 1.7 MG/DL (ref 0–1)
BUN BLDV-MCNC: 13 MG/DL (ref 7–20)
CALCIUM SERPL-MCNC: 9.8 MG/DL (ref 8.3–10.6)
CHLORIDE BLD-SCNC: 105 MMOL/L (ref 99–110)
CHOLESTEROL, TOTAL: 191 MG/DL (ref 0–199)
CO2: 22 MMOL/L (ref 21–32)
CREAT SERPL-MCNC: 0.7 MG/DL (ref 0.6–1.1)
ESTIMATED AVERAGE GLUCOSE: 102.5 MG/DL
GFR SERPL CREATININE-BSD FRML MDRD: >60 ML/MIN/{1.73_M2}
GLUCOSE BLD-MCNC: 101 MG/DL (ref 70–99)
HBA1C MFR BLD: 5.2 %
HDLC SERPL-MCNC: 53 MG/DL (ref 40–60)
LDL CHOLESTEROL CALCULATED: 99 MG/DL
POTASSIUM SERPL-SCNC: 4 MMOL/L (ref 3.5–5.1)
SODIUM BLD-SCNC: 139 MMOL/L (ref 136–145)
TOTAL PROTEIN: 7.2 G/DL (ref 6.4–8.2)
TRIGL SERPL-MCNC: 196 MG/DL (ref 0–150)
VLDLC SERPL CALC-MCNC: 39 MG/DL

## 2023-03-03 PROCEDURE — 36415 COLL VENOUS BLD VENIPUNCTURE: CPT | Performed by: FAMILY MEDICINE

## 2023-03-03 PROCEDURE — 99214 OFFICE O/P EST MOD 30 MIN: CPT | Performed by: FAMILY MEDICINE

## 2023-03-03 PROCEDURE — 90471 IMMUNIZATION ADMIN: CPT | Performed by: FAMILY MEDICINE

## 2023-03-03 PROCEDURE — 90715 TDAP VACCINE 7 YRS/> IM: CPT | Performed by: FAMILY MEDICINE

## 2023-03-03 SDOH — ECONOMIC STABILITY: FOOD INSECURITY: WITHIN THE PAST 12 MONTHS, YOU WORRIED THAT YOUR FOOD WOULD RUN OUT BEFORE YOU GOT MONEY TO BUY MORE.: NEVER TRUE

## 2023-03-03 SDOH — ECONOMIC STABILITY: INCOME INSECURITY: HOW HARD IS IT FOR YOU TO PAY FOR THE VERY BASICS LIKE FOOD, HOUSING, MEDICAL CARE, AND HEATING?: NOT HARD AT ALL

## 2023-03-03 SDOH — ECONOMIC STABILITY: HOUSING INSECURITY
IN THE LAST 12 MONTHS, WAS THERE A TIME WHEN YOU DID NOT HAVE A STEADY PLACE TO SLEEP OR SLEPT IN A SHELTER (INCLUDING NOW)?: NO

## 2023-03-03 SDOH — ECONOMIC STABILITY: FOOD INSECURITY: WITHIN THE PAST 12 MONTHS, THE FOOD YOU BOUGHT JUST DIDN'T LAST AND YOU DIDN'T HAVE MONEY TO GET MORE.: NEVER TRUE

## 2023-03-03 ASSESSMENT — PATIENT HEALTH QUESTIONNAIRE - PHQ9
7. TROUBLE CONCENTRATING ON THINGS, SUCH AS READING THE NEWSPAPER OR WATCHING TELEVISION: 0
2. FEELING DOWN, DEPRESSED OR HOPELESS: 1
SUM OF ALL RESPONSES TO PHQ QUESTIONS 1-9: 2
3. TROUBLE FALLING OR STAYING ASLEEP: 0
6. FEELING BAD ABOUT YOURSELF - OR THAT YOU ARE A FAILURE OR HAVE LET YOURSELF OR YOUR FAMILY DOWN: 0
5. POOR APPETITE OR OVEREATING: 0
SUM OF ALL RESPONSES TO PHQ QUESTIONS 1-9: 2
SUM OF ALL RESPONSES TO PHQ9 QUESTIONS 1 & 2: 2
SUM OF ALL RESPONSES TO PHQ QUESTIONS 1-9: 2
8. MOVING OR SPEAKING SO SLOWLY THAT OTHER PEOPLE COULD HAVE NOTICED. OR THE OPPOSITE, BEING SO FIGETY OR RESTLESS THAT YOU HAVE BEEN MOVING AROUND A LOT MORE THAN USUAL: 0
SUM OF ALL RESPONSES TO PHQ QUESTIONS 1-9: 2
9. THOUGHTS THAT YOU WOULD BE BETTER OFF DEAD, OR OF HURTING YOURSELF: 0
4. FEELING TIRED OR HAVING LITTLE ENERGY: 0
1. LITTLE INTEREST OR PLEASURE IN DOING THINGS: 1
10. IF YOU CHECKED OFF ANY PROBLEMS, HOW DIFFICULT HAVE THESE PROBLEMS MADE IT FOR YOU TO DO YOUR WORK, TAKE CARE OF THINGS AT HOME, OR GET ALONG WITH OTHER PEOPLE: 0

## 2023-03-03 NOTE — TELEPHONE ENCOUNTER
Medication:   Requested Prescriptions     Pending Prescriptions Disp Refills    sertraline (ZOLOFT) 50 MG tablet [Pharmacy Med Name: Sertraline HCl 50 MG Oral Tablet] 90 tablet 3     Sig: TAKE 1 TABLET BY MOUTH IN  THE EVENING        Last Filled:  2/14/2022    Patient Phone Number: [phone] (home)     Last appt: 3/3/2023   Next appt: Visit date not found    Last OARRS: No flowsheet data found.

## 2023-03-06 ENCOUNTER — TELEPHONE (OUTPATIENT)
Dept: FAMILY MEDICINE CLINIC | Age: 53
End: 2023-03-06

## 2023-03-06 NOTE — TELEPHONE ENCOUNTER
Seen Friday. \[facility]\"" spoke with her HR dept, and was advised with new insurance policy that ozempic should now be covered. Wanting to know if you will call in a script for it to [address] [phone].  Pt is reachable at [phone]

## 2023-03-07 DIAGNOSIS — E88.81 METABOLIC SYNDROME: Primary | ICD-10-CM

## 2023-03-07 RX ORDER — SEMAGLUTIDE 1.34 MG/ML
0.5 INJECTION, SOLUTION SUBCUTANEOUS WEEKLY
Qty: 4 ADJUSTABLE DOSE PRE-FILLED PEN SYRINGE | Refills: 0 | Status: SHIPPED | OUTPATIENT
Start: 2023-03-07

## 2023-03-16 ENCOUNTER — NURSE ONLY (OUTPATIENT)
Dept: CARDIOLOGY CLINIC | Age: 53
End: 2023-03-16

## 2023-03-16 DIAGNOSIS — Z95.818 STATUS POST PLACEMENT OF IMPLANTABLE LOOP RECORDER: Primary | ICD-10-CM

## 2023-03-16 DIAGNOSIS — I63.9 CRYPTOGENIC STROKE (HCC): ICD-10-CM

## 2023-03-17 NOTE — PROGRESS NOTES
ILR for CVA. We received a remote transmission from patient's monitor at home. Remote Linq report shows no recorded arrhythmias/ECGs. PVC burden 0.1%. EP physician to review. We will continue to monitor remotely.      (End of 31-day monitoring period 3/16/23)

## 2023-03-29 ENCOUNTER — TELEPHONE (OUTPATIENT)
Dept: FAMILY MEDICINE CLINIC | Age: 53
End: 2023-03-29

## 2023-03-29 DIAGNOSIS — E88.81 METABOLIC SYNDROME: ICD-10-CM

## 2023-03-29 RX ORDER — SEMAGLUTIDE 1.34 MG/ML
0.5 INJECTION, SOLUTION SUBCUTANEOUS WEEKLY
Qty: 12 ADJUSTABLE DOSE PRE-FILLED PEN SYRINGE | Refills: 3 | Status: SHIPPED | OUTPATIENT
Start: 2023-03-29

## 2023-03-29 NOTE — TELEPHONE ENCOUNTER
Requesting refill on ozempic. States her new insurance does cover this med. Would like to have it filled for #90 day supply from mail order pharm if this is possible since it is a refrigerated med. If unable to be sent through mail order, has requested to have it filled at [facility] in [address] Please call pt to let her know where this will be refilled.  Pt is reachable at [phone]

## 2023-05-11 ENCOUNTER — NURSE ONLY (OUTPATIENT)
Dept: CARDIOLOGY CLINIC | Age: 53
End: 2023-05-11

## 2023-05-11 DIAGNOSIS — I63.9 CRYPTOGENIC STROKE (HCC): ICD-10-CM

## 2023-05-11 DIAGNOSIS — Z95.818 STATUS POST PLACEMENT OF IMPLANTABLE LOOP RECORDER: Primary | ICD-10-CM

## 2023-05-18 NOTE — PROGRESS NOTES
ILR for CVA. We received a remote transmission from patient's monitor at home. Remote Linq report shows 2 symptom events w available ECGs which appears to illustrate NSR. PVC burden 0.2%. EP physician to review. We will continue to monitor remotely.      (End of 31-day monitoring period 5/11/23)

## 2023-06-08 ENCOUNTER — TELEPHONE (OUTPATIENT)
Dept: FAMILY MEDICINE CLINIC | Age: 53
End: 2023-06-08

## 2023-06-08 DIAGNOSIS — E88.81 METABOLIC SYNDROME: ICD-10-CM

## 2023-06-08 NOTE — TELEPHONE ENCOUNTER
Pt called to double check the amount of the ozempic she is supposed to be on. Pt stated she is getting ready to reorder and wanted to know because Dr. Pipo Arce said something about increasing the dose. Please advise.  Pt uses [facility] [phone]

## 2023-06-29 ENCOUNTER — NURSE ONLY (OUTPATIENT)
Dept: CARDIOLOGY CLINIC | Age: 53
End: 2023-06-29

## 2023-06-29 DIAGNOSIS — Z95.818 STATUS POST PLACEMENT OF IMPLANTABLE LOOP RECORDER: Primary | ICD-10-CM

## 2023-06-29 DIAGNOSIS — I63.9 CRYPTOGENIC STROKE (HCC): ICD-10-CM

## 2023-06-29 PROCEDURE — G2066 INTER DEVC REMOTE 30D: HCPCS | Performed by: INTERNAL MEDICINE

## 2023-06-29 PROCEDURE — 93298 REM INTERROG DEV EVAL SCRMS: CPT | Performed by: INTERNAL MEDICINE

## 2023-07-14 ENCOUNTER — HOSPITAL ENCOUNTER (OUTPATIENT)
Dept: WOMENS IMAGING | Age: 53
Discharge: HOME | End: 2023-07-14
Payer: COMMERCIAL

## 2023-07-14 DIAGNOSIS — Z12.31 BREAST CANCER SCREENING BY MAMMOGRAM: ICD-10-CM

## 2023-07-14 PROCEDURE — 77063 BREAST TOMOSYNTHESIS BI: CPT

## 2023-07-17 NOTE — PROGRESS NOTES
ILR for CVA. We received a remote transmission from patient's monitor at home. Remote Linq report shows no arrhythmias/ or events. PVC burden 0.3%. EP physician to review. We will continue to monitor remotely.      (End of 31-day monitoring period 6/29/23)

## 2023-09-03 PROCEDURE — 93298 REM INTERROG DEV EVAL SCRMS: CPT | Performed by: INTERNAL MEDICINE

## 2023-09-03 PROCEDURE — G2066 INTER DEVC REMOTE 30D: HCPCS | Performed by: INTERNAL MEDICINE

## 2023-09-08 ENCOUNTER — OFFICE VISIT (OUTPATIENT)
Dept: FAMILY MEDICINE CLINIC | Age: 53
End: 2023-09-08
Payer: COMMERCIAL

## 2023-09-08 VITALS
SYSTOLIC BLOOD PRESSURE: 126 MMHG | BODY MASS INDEX: 40.34 KG/M2 | DIASTOLIC BLOOD PRESSURE: 84 MMHG | RESPIRATION RATE: 16 BRPM | WEIGHT: 281.8 LBS | OXYGEN SATURATION: 96 % | HEART RATE: 94 BPM | HEIGHT: 70 IN

## 2023-09-08 DIAGNOSIS — Z00.00 ROUTINE GENERAL MEDICAL EXAMINATION AT A HEALTH CARE FACILITY: Primary | ICD-10-CM

## 2023-09-08 DIAGNOSIS — E78.2 MIXED HYPERLIPIDEMIA: ICD-10-CM

## 2023-09-08 DIAGNOSIS — Z12.31 ENCOUNTER FOR SCREENING MAMMOGRAM FOR MALIGNANT NEOPLASM OF BREAST: ICD-10-CM

## 2023-09-08 DIAGNOSIS — K21.00 GASTROESOPHAGEAL REFLUX DISEASE WITH ESOPHAGITIS WITHOUT HEMORRHAGE: ICD-10-CM

## 2023-09-08 PROBLEM — J39.1 PHARYNGEAL ABSCESS: Status: RESOLVED | Noted: 2022-04-18 | Resolved: 2023-09-08

## 2023-09-08 LAB
ALBUMIN SERPL-MCNC: 4.8 G/DL (ref 3.4–5)
ALBUMIN/GLOB SERPL: 1.9 {RATIO} (ref 1.1–2.2)
ALP SERPL-CCNC: 85 U/L (ref 40–129)
ALT SERPL-CCNC: 17 U/L (ref 10–40)
ANION GAP SERPL CALCULATED.3IONS-SCNC: 11 MMOL/L (ref 3–16)
AST SERPL-CCNC: 16 U/L (ref 15–37)
BILIRUB SERPL-MCNC: 2.7 MG/DL (ref 0–1)
BUN SERPL-MCNC: 13 MG/DL (ref 7–20)
CALCIUM SERPL-MCNC: 10.2 MG/DL (ref 8.3–10.6)
CHLORIDE SERPL-SCNC: 104 MMOL/L (ref 99–110)
CO2 SERPL-SCNC: 23 MMOL/L (ref 21–32)
CREAT SERPL-MCNC: 0.6 MG/DL (ref 0.6–1.1)
GFR SERPLBLD CREATININE-BSD FMLA CKD-EPI: >60 ML/MIN/{1.73_M2}
GLUCOSE SERPL-MCNC: 100 MG/DL (ref 70–99)
POTASSIUM SERPL-SCNC: 3.9 MMOL/L (ref 3.5–5.1)
PROT SERPL-MCNC: 7.3 G/DL (ref 6.4–8.2)
SODIUM SERPL-SCNC: 138 MMOL/L (ref 136–145)

## 2023-09-08 PROCEDURE — 99396 PREV VISIT EST AGE 40-64: CPT | Performed by: FAMILY MEDICINE

## 2023-09-08 PROCEDURE — 36415 COLL VENOUS BLD VENIPUNCTURE: CPT | Performed by: FAMILY MEDICINE

## 2023-09-08 ASSESSMENT — PATIENT HEALTH QUESTIONNAIRE - PHQ9
4. FEELING TIRED OR HAVING LITTLE ENERGY: 0
SUM OF ALL RESPONSES TO PHQ9 QUESTIONS 1 & 2: 1
6. FEELING BAD ABOUT YOURSELF - OR THAT YOU ARE A FAILURE OR HAVE LET YOURSELF OR YOUR FAMILY DOWN: 0
5. POOR APPETITE OR OVEREATING: 0
10. IF YOU CHECKED OFF ANY PROBLEMS, HOW DIFFICULT HAVE THESE PROBLEMS MADE IT FOR YOU TO DO YOUR WORK, TAKE CARE OF THINGS AT HOME, OR GET ALONG WITH OTHER PEOPLE: 1
2. FEELING DOWN, DEPRESSED OR HOPELESS: 0
SUM OF ALL RESPONSES TO PHQ QUESTIONS 1-9: 1
8. MOVING OR SPEAKING SO SLOWLY THAT OTHER PEOPLE COULD HAVE NOTICED. OR THE OPPOSITE, BEING SO FIGETY OR RESTLESS THAT YOU HAVE BEEN MOVING AROUND A LOT MORE THAN USUAL: 0
SUM OF ALL RESPONSES TO PHQ QUESTIONS 1-9: 1
1. LITTLE INTEREST OR PLEASURE IN DOING THINGS: 1
SUM OF ALL RESPONSES TO PHQ9 QUESTIONS 1 & 2: 1
7. TROUBLE CONCENTRATING ON THINGS, SUCH AS READING THE NEWSPAPER OR WATCHING TELEVISION: 0
1. LITTLE INTEREST OR PLEASURE IN DOING THINGS: 1
3. TROUBLE FALLING OR STAYING ASLEEP: 0
SUM OF ALL RESPONSES TO PHQ QUESTIONS 1-9: 1
SUM OF ALL RESPONSES TO PHQ QUESTIONS 1-9: 1
9. THOUGHTS THAT YOU WOULD BE BETTER OFF DEAD, OR OF HURTING YOURSELF: 0
SUM OF ALL RESPONSES TO PHQ QUESTIONS 1-9: 1
SUM OF ALL RESPONSES TO PHQ QUESTIONS 1-9: 1
2. FEELING DOWN, DEPRESSED OR HOPELESS: 0

## 2023-09-08 NOTE — PROGRESS NOTES
2023    Maurice Germain (:  1970) is a 47 y.o. female, here for evaluation of the following chief complaint(s): Annual Exam and Otalgia (Redness in right ear, irritation, began few days ago)      ASSESSMENT/PLAN:     Diagnosis Orders   1. Routine general medical examination at a health care facility        2. Encounter for screening mammogram for malignant neoplasm of breast      mammo reviewed 2023 OK      3. Mixed hyperlipidemia  Comprehensive Metabolic Panel    cmp on statin cont      4. Gastroesophageal reflux disease with esophagitis without hemorrhage      OK PPI advised Mag Cit 250 HS          Return in about 6 months (around 3/8/2024) for Hyperlipidemia. An electronic signature was used to authenticate this note.     SUBJECTIVE/OBJECTIVE:  (NOTE : prior results listed below reviewed at this visit to assist in medical decision making.)    HPI / ROS    # Preventive and other issues  # screen breast cancer - screening status discussed with patient; reviewed today prior mammo done 2023 OK    # screen cervical cancer - screening status discussed with patient sees gyn just completed [facility]    # Hyperlipidemia on statin amaya this no myalgias or weakness  Lab Results   Component Value Date    LDLCALC 99 2023    LDLDIRECT 122 (H) 10/10/2019      Lab Results   Component Value Date    ALT 20 2023    AST 18 2023    ALKPHOS 77 2023    BILITOT 1.7 (H) 2023      LIPIDS UTD    # GERD - OK on Proton Pump Inhibitor (PPI) per pt; no pain, cough or acid reflux taste; advised Magnesium 250 mg QHS with med          Wt Readings from Last 3 Encounters:   23 281 lb 12.8 oz (127.8 kg)   23 (!) 301 lb (136.5 kg)   23 (!) 304 lb 14.3 oz (138.3 kg)       BP Readings from Last 3 Encounters:   23 126/84   23 112/80   23 105/67       PHYSICAL EXAM  Vitals:    23 0843   BP: 126/84   Site: Left Upper Arm   Pulse: 94   Resp: 16   SpO2: 96%

## 2023-09-29 ENCOUNTER — NURSE ONLY (OUTPATIENT)
Dept: FAMILY MEDICINE CLINIC | Age: 53
End: 2023-09-29
Payer: COMMERCIAL

## 2023-09-29 DIAGNOSIS — Z23 NEED FOR INFLUENZA VACCINATION: Primary | ICD-10-CM

## 2023-09-29 PROCEDURE — 90674 CCIIV4 VAC NO PRSV 0.5 ML IM: CPT | Performed by: FAMILY MEDICINE

## 2023-09-29 PROCEDURE — 90471 IMMUNIZATION ADMIN: CPT | Performed by: FAMILY MEDICINE

## 2023-10-08 PROCEDURE — 93298 REM INTERROG DEV EVAL SCRMS: CPT | Performed by: INTERNAL MEDICINE

## 2023-10-08 PROCEDURE — G2066 INTER DEVC REMOTE 30D: HCPCS | Performed by: INTERNAL MEDICINE

## 2023-10-25 ENCOUNTER — OFFICE VISIT (OUTPATIENT)
Dept: FAMILY MEDICINE CLINIC | Age: 53
End: 2023-10-25
Payer: COMMERCIAL

## 2023-10-25 VITALS
HEIGHT: 70 IN | BODY MASS INDEX: 40.31 KG/M2 | SYSTOLIC BLOOD PRESSURE: 126 MMHG | TEMPERATURE: 98.9 F | HEART RATE: 85 BPM | DIASTOLIC BLOOD PRESSURE: 82 MMHG | OXYGEN SATURATION: 94 % | WEIGHT: 281.6 LBS

## 2023-10-25 DIAGNOSIS — J06.9 VIRAL URI: Primary | ICD-10-CM

## 2023-10-25 LAB — S PYO AG THROAT QL: NORMAL

## 2023-10-25 PROCEDURE — 99213 OFFICE O/P EST LOW 20 MIN: CPT | Performed by: NURSE PRACTITIONER

## 2023-10-25 PROCEDURE — 87880 STREP A ASSAY W/OPTIC: CPT | Performed by: NURSE PRACTITIONER

## 2023-10-25 RX ORDER — GUAIFENESIN 600 MG/1
1200 TABLET, EXTENDED RELEASE ORAL 2 TIMES DAILY
Qty: 40 TABLET | Refills: 0 | Status: SHIPPED | OUTPATIENT
Start: 2023-10-25 | End: 2023-11-04

## 2023-10-25 RX ORDER — PREDNISONE 10 MG/1
10 TABLET ORAL 2 TIMES DAILY
Qty: 10 TABLET | Refills: 0 | Status: SHIPPED | OUTPATIENT
Start: 2023-10-25 | End: 2023-10-30

## 2023-10-25 RX ORDER — DICLOFENAC SODIUM 75 MG/1
75 TABLET, DELAYED RELEASE ORAL 2 TIMES DAILY
COMMUNITY
Start: 2023-09-28

## 2023-10-25 NOTE — PROGRESS NOTES
Dima Olvera (:  1970) is a 47 y.o. female,Established patient, here for evaluation of the following chief complaint(s):  Pharyngitis (Hot and cold chills, body aches, nausea, one bout of emesis, no appetite, hurts to swallow. Negative covid test. Began Monday. No known fever.)         ASSESSMENT/PLAN:  1. Viral URI  -     Culture, Throat  -     POCT rapid strep A  -     predniSONE (DELTASONE) 10 MG tablet; Take 1 tablet by mouth 2 times daily for 5 days, Disp-10 tablet, R-0Normal  -     guaiFENesin (MUCINEX) 600 MG extended release tablet; Take 2 tablets by mouth 2 times daily for 10 days, Disp-40 tablet, R-0Normal  - cold fluids and salt water gargles  - tylenol for pain and aches    Results for POC orders placed in visit on 10/25/23   POCT rapid strep A   Result Value Ref Range    Strep A Ag None Detected None Detected     Return if symptoms worsen or fail to improve. Subjective   SUBJECTIVE/OBJECTIVE:  HPI    Patient presents today for sore throat, congestion and head pressure for about 3 days. Denies fevers but states aches, chills and some nausea. Denies headaches. Took covid test at home which was negative. Denies otc medication. Review of Systems   See HPI    Objective   Physical Exam  Vitals and nursing note reviewed. Constitutional:       Appearance: Normal appearance. HENT:      Nose: Congestion and rhinorrhea present. Mouth/Throat:      Pharynx: Posterior oropharyngeal erythema present. Cardiovascular:      Rate and Rhythm: Normal rate and regular rhythm. Pulmonary:      Effort: Pulmonary effort is normal.      Breath sounds: Normal breath sounds. Musculoskeletal:         General: Normal range of motion. Skin:     General: Skin is warm and dry. Neurological:      General: No focal deficit present. Mental Status: She is alert and oriented to person, place, and time.             On this date 10/25/2023 I have spent 25 minutes reviewing previous notes, test

## 2023-10-27 LAB
BACTERIA THROAT AEROBE CULT: ABNORMAL
BACTERIA THROAT AEROBE CULT: ABNORMAL
ORGANISM: ABNORMAL

## 2023-10-27 RX ORDER — AMOXICILLIN 500 MG/1
500 CAPSULE ORAL 3 TIMES DAILY
Qty: 30 CAPSULE | Refills: 0 | Status: SHIPPED | OUTPATIENT
Start: 2023-10-27 | End: 2023-11-06

## 2023-11-14 PROCEDURE — G2066 INTER DEVC REMOTE 30D: HCPCS | Performed by: INTERNAL MEDICINE

## 2023-11-14 PROCEDURE — 93298 REM INTERROG DEV EVAL SCRMS: CPT | Performed by: INTERNAL MEDICINE

## 2023-12-19 PROCEDURE — G2066 INTER DEVC REMOTE 30D: HCPCS | Performed by: INTERNAL MEDICINE

## 2023-12-19 PROCEDURE — 93298 REM INTERROG DEV EVAL SCRMS: CPT | Performed by: INTERNAL MEDICINE

## 2024-01-23 PROCEDURE — 93298 REM INTERROG DEV EVAL SCRMS: CPT | Performed by: INTERNAL MEDICINE

## 2024-03-12 ASSESSMENT — PATIENT HEALTH QUESTIONNAIRE - PHQ9
8. MOVING OR SPEAKING SO SLOWLY THAT OTHER PEOPLE COULD HAVE NOTICED. OR THE OPPOSITE - BEING SO FIDGETY OR RESTLESS THAT YOU HAVE BEEN MOVING AROUND A LOT MORE THAN USUAL: NOT AT ALL
4. FEELING TIRED OR HAVING LITTLE ENERGY: 1
2. FEELING DOWN, DEPRESSED OR HOPELESS: 0
7. TROUBLE CONCENTRATING ON THINGS, SUCH AS READING THE NEWSPAPER OR WATCHING TELEVISION: 0
10. IF YOU CHECKED OFF ANY PROBLEMS, HOW DIFFICULT HAVE THESE PROBLEMS MADE IT FOR YOU TO DO YOUR WORK, TAKE CARE OF THINGS AT HOME, OR GET ALONG WITH OTHER PEOPLE: 0
2. FEELING DOWN, DEPRESSED OR HOPELESS: NOT AT ALL
6. FEELING BAD ABOUT YOURSELF - OR THAT YOU ARE A FAILURE OR HAVE LET YOURSELF OR YOUR FAMILY DOWN: NOT AT ALL
7. TROUBLE CONCENTRATING ON THINGS, SUCH AS READING THE NEWSPAPER OR WATCHING TELEVISION: NOT AT ALL
10. IF YOU CHECKED OFF ANY PROBLEMS, HOW DIFFICULT HAVE THESE PROBLEMS MADE IT FOR YOU TO DO YOUR WORK, TAKE CARE OF THINGS AT HOME, OR GET ALONG WITH OTHER PEOPLE: NOT DIFFICULT AT ALL
1. LITTLE INTEREST OR PLEASURE IN DOING THINGS: NOT AT ALL
SUM OF ALL RESPONSES TO PHQ QUESTIONS 1-9: 3
SUM OF ALL RESPONSES TO PHQ QUESTIONS 1-9: 3
4. FEELING TIRED OR HAVING LITTLE ENERGY: SEVERAL DAYS
3. TROUBLE FALLING OR STAYING ASLEEP: SEVERAL DAYS
1. LITTLE INTEREST OR PLEASURE IN DOING THINGS: 0
5. POOR APPETITE OR OVEREATING: 1
SUM OF ALL RESPONSES TO PHQ QUESTIONS 1-9: 3
9. THOUGHTS THAT YOU WOULD BE BETTER OFF DEAD, OR OF HURTING YOURSELF: NOT AT ALL
8. MOVING OR SPEAKING SO SLOWLY THAT OTHER PEOPLE COULD HAVE NOTICED. OR THE OPPOSITE, BEING SO FIGETY OR RESTLESS THAT YOU HAVE BEEN MOVING AROUND A LOT MORE THAN USUAL: 0
SUM OF ALL RESPONSES TO PHQ9 QUESTIONS 1 & 2: 0
SUM OF ALL RESPONSES TO PHQ QUESTIONS 1-9: 3
5. POOR APPETITE OR OVEREATING: SEVERAL DAYS
3. TROUBLE FALLING OR STAYING ASLEEP: 1
6. FEELING BAD ABOUT YOURSELF - OR THAT YOU ARE A FAILURE OR HAVE LET YOURSELF OR YOUR FAMILY DOWN: 0
SUM OF ALL RESPONSES TO PHQ QUESTIONS 1-9: 3
9. THOUGHTS THAT YOU WOULD BE BETTER OFF DEAD, OR OF HURTING YOURSELF: 0

## 2024-03-15 ENCOUNTER — OFFICE VISIT (OUTPATIENT)
Dept: FAMILY MEDICINE CLINIC | Age: 54
End: 2024-03-15

## 2024-03-15 VITALS
OXYGEN SATURATION: 96 % | WEIGHT: 284 LBS | HEART RATE: 82 BPM | DIASTOLIC BLOOD PRESSURE: 70 MMHG | SYSTOLIC BLOOD PRESSURE: 122 MMHG | BODY MASS INDEX: 40.66 KG/M2 | RESPIRATION RATE: 18 BRPM | HEIGHT: 70 IN

## 2024-03-15 DIAGNOSIS — Z12.31 SCREENING MAMMOGRAM FOR BREAST CANCER: ICD-10-CM

## 2024-03-15 DIAGNOSIS — E88.810 METABOLIC SYNDROME: ICD-10-CM

## 2024-03-15 DIAGNOSIS — Z00.00 ROUTINE GENERAL MEDICAL EXAMINATION AT A HEALTH CARE FACILITY: Primary | ICD-10-CM

## 2024-03-15 DIAGNOSIS — E78.2 MIXED HYPERLIPIDEMIA: ICD-10-CM

## 2024-03-15 DIAGNOSIS — E66.01 MORBID OBESITY WITH BMI OF 40.0-44.9, ADULT (HCC): ICD-10-CM

## 2024-03-15 LAB
CHOLEST SERPL-MCNC: 182 MG/DL (ref 0–199)
EST. AVERAGE GLUCOSE BLD GHB EST-MCNC: 102.5 MG/DL
HBA1C MFR BLD: 5.2 %
HDLC SERPL-MCNC: 52 MG/DL (ref 40–60)
LDLC SERPL CALC-MCNC: 97 MG/DL
TRIGL SERPL-MCNC: 166 MG/DL (ref 0–150)
VLDLC SERPL CALC-MCNC: 33 MG/DL

## 2024-03-15 RX ORDER — SEMAGLUTIDE 1.34 MG/ML
1 INJECTION, SOLUTION SUBCUTANEOUS WEEKLY
COMMUNITY
Start: 2024-02-05

## 2024-03-15 RX ORDER — POLYETHYLENE GLYCOL 3350 17 G/17G
17 POWDER, FOR SOLUTION ORAL DAILY
COMMUNITY

## 2024-03-15 SDOH — ECONOMIC STABILITY: FOOD INSECURITY: WITHIN THE PAST 12 MONTHS, YOU WORRIED THAT YOUR FOOD WOULD RUN OUT BEFORE YOU GOT MONEY TO BUY MORE.: NEVER TRUE

## 2024-03-15 SDOH — ECONOMIC STABILITY: FOOD INSECURITY: WITHIN THE PAST 12 MONTHS, THE FOOD YOU BOUGHT JUST DIDN'T LAST AND YOU DIDN'T HAVE MONEY TO GET MORE.: NEVER TRUE

## 2024-03-15 SDOH — ECONOMIC STABILITY: INCOME INSECURITY: HOW HARD IS IT FOR YOU TO PAY FOR THE VERY BASICS LIKE FOOD, HOUSING, MEDICAL CARE, AND HEATING?: NOT HARD AT ALL

## 2024-03-15 NOTE — PROGRESS NOTES
10/25/23 126/82   09/08/23 126/84       PHYSICAL EXAM  Vitals:    03/15/24 0911   BP: 122/70   Pulse: 82   Resp: 18   SpO2: 96%   Weight: 128.8 kg (284 lb)   Height: 1.778 m (5' 10\")     A&o  Neck no TMG no bruit  Car reg no MGR  Lungs cta  Ext no edema  Skin no jaundice  Eyes anicteric     2-3 times/wk

## 2024-03-15 NOTE — PATIENT INSTRUCTIONS
Low Carb Eating with Intermittent Fasting  target < 100 grams of carb daily; ZERO grained based carbs  Get carbs mostly from whole fruits - strawberries, raspberries, blackberries, apples, oranges, pineapples, bananas etc.    Necessary Electrolyte Supplementation when eating Low Carb (< 100 gram of carb daily)  [facility] Pink Salt - 1/4 teaspoon in 12 ounces of water daily first thing in AM (~500 mg sodium)  Minimum 1 medium sized banana daily (~ 500 mg potassium)  If feeling weak/headache/tired/palpitations, supplement as above twice daily    Intermittent Fasting (\"I.F.\") / Eating Window   Only eat between noon and 8 PM  Water any time  Coffee with cream and no more than 1 teaspoon sugar OK in AM    Google/ YouTube AUTOPHAGY - a primary benefit of IF    Other helpful books and websites  1) Wheat Belly by Seth Raya MD ([email or web address].[email or web address])  2) The Primal Blueprint by Josafat Shrestha (www.TrinhiSpye - review success stories)  2) Living Paleo For Dummies

## 2024-05-07 PROCEDURE — 93298 REM INTERROG DEV EVAL SCRMS: CPT | Performed by: INTERNAL MEDICINE

## 2024-06-17 DIAGNOSIS — F32.89 OTHER DEPRESSION: ICD-10-CM

## 2024-06-17 NOTE — TELEPHONE ENCOUNTER
Medication:   Requested Prescriptions     Pending Prescriptions Disp Refills    OZEMPIC, 1 MG/DOSE, 4 MG/3ML SOPN sc injection [Pharmacy Med Name: Ozempic (1 MG/DOSE) 4 MG/3ML Subcutaneous Solution Pen-injector] 9 mL 3     Sig: INJECT SUBCUTANEOUSLY 1 MG EVERY WEEK       Last Filled:  2/5/2024    Patient Phone Number: [phone] (home)     Last appt: 3/15/2024   Next appt: 9/20/2024

## 2024-06-17 NOTE — TELEPHONE ENCOUNTER
Jillian called in stating that she needs a refill on her ATORVASTATIN 40MG sent to:    [facility] - [place], [place] - [address] [place] [address] - P [phone] - F [phone]  [address] [place] [address] [address], [facility] [address]  Phone: [phone]  Fax: [phone]

## 2024-06-17 NOTE — TELEPHONE ENCOUNTER
Medication:   Requested Prescriptions     Pending Prescriptions Disp Refills    atorvastatin (LIPITOR) 40 MG tablet 90 tablet 3     Sig: Take 1 tablet by mouth nightly       Last Filled:  11/9/2022    Patient Phone Number: [phone] (home)     Last appt: 3/15/2024   Next appt: 9/20/2024

## 2024-06-17 NOTE — TELEPHONE ENCOUNTER
Medication:   Requested Prescriptions     Pending Prescriptions Disp Refills    sertraline (ZOLOFT) 50 MG tablet [Pharmacy Med Name: Sertraline HCl 50 MG Oral Tablet] 90 tablet 3     Sig: TAKE 1 TABLET BY MOUTH IN THE  EVENING       Last Filled:  3/3/2023    Patient Phone Number: [phone] (home)     Last appt: 3/15/2024   Next appt: 9/20/2024

## 2024-06-18 RX ORDER — SEMAGLUTIDE 1.34 MG/ML
INJECTION, SOLUTION SUBCUTANEOUS
Qty: 9 ML | Refills: 3 | Status: SHIPPED | OUTPATIENT
Start: 2024-06-18

## 2024-06-18 RX ORDER — ATORVASTATIN CALCIUM 40 MG/1
40 TABLET, FILM COATED ORAL NIGHTLY
Qty: 90 TABLET | Refills: 3 | Status: SHIPPED | OUTPATIENT
Start: 2024-06-18

## 2024-09-24 ENCOUNTER — OFFICE VISIT (OUTPATIENT)
Dept: FAMILY MEDICINE CLINIC | Age: 54
End: 2024-09-24
Payer: COMMERCIAL

## 2024-09-24 VITALS
SYSTOLIC BLOOD PRESSURE: 124 MMHG | WEIGHT: 283 LBS | BODY MASS INDEX: 40.52 KG/M2 | HEART RATE: 90 BPM | RESPIRATION RATE: 18 BRPM | OXYGEN SATURATION: 96 % | HEIGHT: 70 IN | DIASTOLIC BLOOD PRESSURE: 80 MMHG

## 2024-09-24 DIAGNOSIS — E88.810 METABOLIC SYNDROME: ICD-10-CM

## 2024-09-24 DIAGNOSIS — Z13.6 ENCOUNTER FOR SCREENING FOR CORONARY ARTERY DISEASE: ICD-10-CM

## 2024-09-24 DIAGNOSIS — E78.2 MIXED HYPERLIPIDEMIA: Primary | ICD-10-CM

## 2024-09-24 DIAGNOSIS — R00.2 PALPITATIONS: ICD-10-CM

## 2024-09-24 DIAGNOSIS — Z23 NEEDS FLU SHOT: ICD-10-CM

## 2024-09-24 LAB
ALBUMIN SERPL-MCNC: 4.3 G/DL (ref 3.4–5)
ALBUMIN/GLOB SERPL: 1.8 {RATIO} (ref 1.1–2.2)
ALP SERPL-CCNC: 73 U/L (ref 40–129)
ALT SERPL-CCNC: 16 U/L (ref 10–40)
ANION GAP SERPL CALCULATED.3IONS-SCNC: 10 MMOL/L (ref 3–16)
AST SERPL-CCNC: 18 U/L (ref 15–37)
BILIRUB SERPL-MCNC: 2.6 MG/DL (ref 0–1)
BUN SERPL-MCNC: 13 MG/DL (ref 7–20)
CALCIUM SERPL-MCNC: 9.6 MG/DL (ref 8.3–10.6)
CHLORIDE SERPL-SCNC: 104 MMOL/L (ref 99–110)
CO2 SERPL-SCNC: 23 MMOL/L (ref 21–32)
CREAT SERPL-MCNC: 0.8 MG/DL (ref 0.6–1.1)
GFR SERPLBLD CREATININE-BSD FMLA CKD-EPI: 88 ML/MIN/{1.73_M2}
GLUCOSE SERPL-MCNC: 89 MG/DL (ref 70–99)
POTASSIUM SERPL-SCNC: 4.2 MMOL/L (ref 3.5–5.1)
PROT SERPL-MCNC: 6.7 G/DL (ref 6.4–8.2)
SODIUM SERPL-SCNC: 137 MMOL/L (ref 136–145)

## 2024-09-24 PROCEDURE — 90471 IMMUNIZATION ADMIN: CPT | Performed by: FAMILY MEDICINE

## 2024-09-24 PROCEDURE — 90661 CCIIV3 VAC ABX FR 0.5 ML IM: CPT | Performed by: FAMILY MEDICINE

## 2024-09-24 PROCEDURE — 99214 OFFICE O/P EST MOD 30 MIN: CPT | Performed by: FAMILY MEDICINE

## 2024-09-24 ASSESSMENT — PATIENT HEALTH QUESTIONNAIRE - PHQ9
SUM OF ALL RESPONSES TO PHQ QUESTIONS 1-9: 0
SUM OF ALL RESPONSES TO PHQ9 QUESTIONS 1 & 2: 0
1. LITTLE INTEREST OR PLEASURE IN DOING THINGS: NOT AT ALL
9. THOUGHTS THAT YOU WOULD BE BETTER OFF DEAD, OR OF HURTING YOURSELF: NOT AT ALL
6. FEELING BAD ABOUT YOURSELF - OR THAT YOU ARE A FAILURE OR HAVE LET YOURSELF OR YOUR FAMILY DOWN: NOT AT ALL
SUM OF ALL RESPONSES TO PHQ QUESTIONS 1-9: 0
SUM OF ALL RESPONSES TO PHQ QUESTIONS 1-9: 0
4. FEELING TIRED OR HAVING LITTLE ENERGY: NOT AT ALL
5. POOR APPETITE OR OVEREATING: NOT AT ALL
2. FEELING DOWN, DEPRESSED OR HOPELESS: NOT AT ALL
3. TROUBLE FALLING OR STAYING ASLEEP: NOT AT ALL
7. TROUBLE CONCENTRATING ON THINGS, SUCH AS READING THE NEWSPAPER OR WATCHING TELEVISION: NOT AT ALL
10. IF YOU CHECKED OFF ANY PROBLEMS, HOW DIFFICULT HAVE THESE PROBLEMS MADE IT FOR YOU TO DO YOUR WORK, TAKE CARE OF THINGS AT HOME, OR GET ALONG WITH OTHER PEOPLE: NOT DIFFICULT AT ALL
SUM OF ALL RESPONSES TO PHQ QUESTIONS 1-9: 0
8. MOVING OR SPEAKING SO SLOWLY THAT OTHER PEOPLE COULD HAVE NOTICED. OR THE OPPOSITE, BEING SO FIGETY OR RESTLESS THAT YOU HAVE BEEN MOVING AROUND A LOT MORE THAN USUAL: NOT AT ALL

## 2024-11-04 NOTE — PROGRESS NOTES
[facility]   Electrophysiology Follow up     Date: 11/5/2024  I had the privilege of visiting Jillian Alston in the office.       CC: Follow up loop recorder     HPI: Jillian Alston is a 54 y.o. 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    Patient presents to the office today as follow-up for the management of her loop recorder.  Since device interrogation she has had no documented episodes of atrial fibrillation or atrial flutter.  She has occasional dizziness that occurs during the summer months, involves diaphoresis, feels as if she will pass out but has not had syncope.  Denies palpitations or racing heart at this time.    Review of System:  [x] Full ROS obtained and negative except as mentioned in HPI      Prior to Admission medications    Medication Sig Start Date End Date Taking? Authorizing Provider   OZEMPIC, 1 MG/DOSE, 4 MG/3ML SOPN sc injection INJECT SUBCUTANEOUSLY 1 MG EVERY WEEK 6/18/24  Yes Nathan Reveles MD   sertraline (ZOLOFT) 50 MG tablet TAKE 1 TABLET BY MOUTH IN THE  EVENING 6/18/24  Yes Nathan Reveles MD   atorvastatin (LIPITOR) 40 MG tablet Take 1 tablet by mouth nightly 6/18/24  Yes Nathan Reveles MD   polyethylene glycol (GLYCOLAX) 17 GM/SCOOP powder Take 17 g by mouth daily   Yes ProviderCal MD   diclofenac (VOLTAREN) 75 MG EC tablet Take 1 tablet by mouth 2 times daily 9/28/23  Yes ProviderCal MD   vitamin D (CHOLECALCIFEROL) 25 MCG (1000 UT) TABS tablet Take 1 tablet by mouth daily   Yes ProviderCal MD   cetirizine (ZYRTEC) 10 MG

## 2024-11-05 ENCOUNTER — OFFICE VISIT (OUTPATIENT)
Dept: CARDIOLOGY CLINIC | Age: 54
End: 2024-11-05

## 2024-11-05 VITALS
OXYGEN SATURATION: 96 % | SYSTOLIC BLOOD PRESSURE: 124 MMHG | WEIGHT: 290 LBS | BODY MASS INDEX: 41.61 KG/M2 | HEART RATE: 85 BPM | DIASTOLIC BLOOD PRESSURE: 88 MMHG

## 2024-11-05 DIAGNOSIS — Z86.73 HISTORY OF TIA (TRANSIENT ISCHEMIC ATTACK): Primary | ICD-10-CM

## 2024-11-05 DIAGNOSIS — Z95.818 STATUS POST PLACEMENT OF IMPLANTABLE LOOP RECORDER: ICD-10-CM

## 2024-11-05 NOTE — PATIENT INSTRUCTIONS
If you have questions regarding your Loop recorder explant please call nurse at [phone].     Loop Recorder Explant Pre Procedure Instructions     Date:____________________________     Arrive at:_________________________     Procedure time:_____________________        The morning of your procedure you will park in the [facility] parking lot and report directly to the cath lab to check in.  At the information desk stay right and go all the way to the end of the lopes, this will take you directly to your check in desk for the cath lab.       Pre-Procedure Instructions  Do not eat or drink anything for two hours prior to your procedure.  Do not use any lotions, creams or perfume the morning of procedure.  Cath lab will provide you with all post procedure instructions.  You will follow up one week after implantation for implantation site check to make sure it is healing well.

## 2025-01-08 ENCOUNTER — OFFICE VISIT (OUTPATIENT)
Dept: FAMILY MEDICINE CLINIC | Age: 55
End: 2025-01-08
Payer: COMMERCIAL

## 2025-01-08 VITALS
WEIGHT: 292 LBS | BODY MASS INDEX: 41.8 KG/M2 | RESPIRATION RATE: 18 BRPM | SYSTOLIC BLOOD PRESSURE: 118 MMHG | HEART RATE: 87 BPM | OXYGEN SATURATION: 98 % | HEIGHT: 70 IN | DIASTOLIC BLOOD PRESSURE: 80 MMHG

## 2025-01-08 DIAGNOSIS — M17.12 PRIMARY OSTEOARTHRITIS OF ONE KNEE, LEFT: Primary | ICD-10-CM

## 2025-01-08 PROCEDURE — 99212 OFFICE O/P EST SF 10 MIN: CPT | Performed by: FAMILY MEDICINE

## 2025-01-08 PROCEDURE — 20610 DRAIN/INJ JOINT/BURSA W/O US: CPT | Performed by: FAMILY MEDICINE

## 2025-01-08 RX ORDER — METHYLPREDNISOLONE ACETATE 40 MG/ML
40 INJECTION, SUSPENSION INTRA-ARTICULAR; INTRALESIONAL; INTRAMUSCULAR; SOFT TISSUE ONCE
Status: COMPLETED | OUTPATIENT
Start: 2025-01-08 | End: 2025-01-08

## 2025-01-08 RX ADMIN — METHYLPREDNISOLONE ACETATE 40 MG: 40 INJECTION, SUSPENSION INTRA-ARTICULAR; INTRALESIONAL; INTRAMUSCULAR; SOFT TISSUE at 13:25

## 2025-01-08 SDOH — ECONOMIC STABILITY: FOOD INSECURITY: WITHIN THE PAST 12 MONTHS, THE FOOD YOU BOUGHT JUST DIDN'T LAST AND YOU DIDN'T HAVE MONEY TO GET MORE.: NEVER TRUE

## 2025-01-08 SDOH — ECONOMIC STABILITY: FOOD INSECURITY: WITHIN THE PAST 12 MONTHS, YOU WORRIED THAT YOUR FOOD WOULD RUN OUT BEFORE YOU GOT MONEY TO BUY MORE.: NEVER TRUE

## 2025-01-08 ASSESSMENT — PATIENT HEALTH QUESTIONNAIRE - PHQ9
3. TROUBLE FALLING OR STAYING ASLEEP: NOT AT ALL
10. IF YOU CHECKED OFF ANY PROBLEMS, HOW DIFFICULT HAVE THESE PROBLEMS MADE IT FOR YOU TO DO YOUR WORK, TAKE CARE OF THINGS AT HOME, OR GET ALONG WITH OTHER PEOPLE: NOT DIFFICULT AT ALL
6. FEELING BAD ABOUT YOURSELF - OR THAT YOU ARE A FAILURE OR HAVE LET YOURSELF OR YOUR FAMILY DOWN: NOT AT ALL
9. THOUGHTS THAT YOU WOULD BE BETTER OFF DEAD, OR OF HURTING YOURSELF: NOT AT ALL
SUM OF ALL RESPONSES TO PHQ QUESTIONS 1-9: 2
5. POOR APPETITE OR OVEREATING: NOT AT ALL
SUM OF ALL RESPONSES TO PHQ QUESTIONS 1-9: 2
4. FEELING TIRED OR HAVING LITTLE ENERGY: NOT AT ALL
SUM OF ALL RESPONSES TO PHQ QUESTIONS 1-9: 2
1. LITTLE INTEREST OR PLEASURE IN DOING THINGS: SEVERAL DAYS
2. FEELING DOWN, DEPRESSED OR HOPELESS: SEVERAL DAYS
8. MOVING OR SPEAKING SO SLOWLY THAT OTHER PEOPLE COULD HAVE NOTICED. OR THE OPPOSITE, BEING SO FIGETY OR RESTLESS THAT YOU HAVE BEEN MOVING AROUND A LOT MORE THAN USUAL: NOT AT ALL
SUM OF ALL RESPONSES TO PHQ9 QUESTIONS 1 & 2: 2
7. TROUBLE CONCENTRATING ON THINGS, SUCH AS READING THE NEWSPAPER OR WATCHING TELEVISION: NOT AT ALL
SUM OF ALL RESPONSES TO PHQ QUESTIONS 1-9: 2

## 2025-01-08 NOTE — PROGRESS NOTES
2025    Jillian Alston (:  1970) is a 54 y.o. female, here for evaluation of the following chief complaint(s):  Knee Pain (Started over a week ago, no injury. Over three weeks ago did have pain in foot unsure if related. )      ASSESSMENT/PLAN:     Diagnosis Orders   1. Primary osteoarthritis of one knee, left  DRAIN/INJECT LARGE JOINT/BURSA    methylPREDNISolone acetate (DEPO-MEDROL) injection 40 mg    reveiwed prior benefot R knee injecion agrees to injection as below          No follow-ups on file.    An electronic signature was used to authenticate this note.    SUBJECTIVE/OBJECTIVE:  (NOTE : prior results listed below reviewed at this visit to assist in medical decision making.)    HPI / ROS    # chronic worsening left knee pain medial joint line burning no trauma hx similar to prior knee (Right side)         Wt Readings from Last 3 Encounters:   25 132.5 kg (292 lb)   24 131.5 kg (290 lb)   24 128.4 kg (283 lb)       BP Readings from Last 3 Encounters:   25 118/80   24 124/88   24 124/80       PHYSICAL EXAM  Vitals:    25 1222   BP: 118/80   Pulse: 87   Resp: 18   SpO2: 98%   Weight: 132.5 kg (292 lb)   Height: 1.778 m (5' 10\")     A&o  L knee warmth limited to left medial joint line    left knee : After review of risks and benefits of injection, including infection, bleeding, pain, and need to treat, as well as possible improvement in pain and function, patient agreed to proceed with injection. Using sterile technique and an anterior approach, I injected 40 mg of depo-medrol with 2 cc of local anesthetic. The procedure was well tolerated by the patient. The injection site was cleaned and dressed with a band-aid. Signs and symptoms of infection were reviewed with the patient, including pain, redness, swelling, and warmth. Patient was instructed to contact me immediately for any of these signs or for a lack of improvement, and voices understanding and

## 2025-01-31 ENCOUNTER — TELEPHONE (OUTPATIENT)
Dept: FAMILY MEDICINE CLINIC | Age: 55
End: 2025-01-31

## 2025-01-31 NOTE — TELEPHONE ENCOUNTER
Pt called in stating that she was in and got a shot in her knee a few weeks ago. She reports that she is still having pain and wanted to speak with assistant to relay info to dr hurt. She would like a call back at [phone]

## 2025-02-03 NOTE — TELEPHONE ENCOUNTER
Spoke with Jillian the injection did help. But when on the plane tweaked her knee when walking. She started feeling bad and tried to move to fast. The med that she was taking for plantar fascitis is not helping. Asked if anything she can have on hand. She took a muscle relaxer on her trip, a friend had skelaxin and seemed to really help. She asked if any way she could get a few for when pain is bad. Or if something that would help. I did pend what she tried if that is ok to local [facility] in [place]

## 2025-02-04 RX ORDER — METHOCARBAMOL 500 MG/1
500 TABLET, FILM COATED ORAL 2 TIMES DAILY PRN
Qty: 60 TABLET | Refills: 0 | Status: SHIPPED | OUTPATIENT
Start: 2025-02-04 | End: 2025-04-05

## 2025-04-04 ENCOUNTER — OFFICE VISIT (OUTPATIENT)
Dept: FAMILY MEDICINE CLINIC | Age: 55
End: 2025-04-04
Payer: COMMERCIAL

## 2025-04-04 VITALS
RESPIRATION RATE: 16 BRPM | SYSTOLIC BLOOD PRESSURE: 126 MMHG | OXYGEN SATURATION: 96 % | HEART RATE: 90 BPM | HEIGHT: 70 IN | BODY MASS INDEX: 41.23 KG/M2 | WEIGHT: 288 LBS | DIASTOLIC BLOOD PRESSURE: 78 MMHG

## 2025-04-04 DIAGNOSIS — Z12.31 BREAST CANCER SCREENING BY MAMMOGRAM: ICD-10-CM

## 2025-04-04 DIAGNOSIS — Z13.1 SCREENING FOR DIABETES MELLITUS: ICD-10-CM

## 2025-04-04 DIAGNOSIS — E66.01 MORBID OBESITY WITH BMI OF 40.0-44.9, ADULT: ICD-10-CM

## 2025-04-04 DIAGNOSIS — E78.2 MIXED HYPERLIPIDEMIA: ICD-10-CM

## 2025-04-04 DIAGNOSIS — Z23 NEED FOR PROPHYLACTIC VACCINATION AGAINST STREPTOCOCCUS PNEUMONIAE (PNEUMOCOCCUS): ICD-10-CM

## 2025-04-04 DIAGNOSIS — Z00.00 ROUTINE GENERAL MEDICAL EXAMINATION AT A HEALTH CARE FACILITY: Primary | ICD-10-CM

## 2025-04-04 LAB
ALBUMIN SERPL-MCNC: 4.5 G/DL (ref 3.4–5)
ALBUMIN/GLOB SERPL: 1.9 {RATIO} (ref 1.1–2.2)
ALP SERPL-CCNC: 66 U/L (ref 40–129)
ALT SERPL-CCNC: 21 U/L (ref 10–40)
ANION GAP SERPL CALCULATED.3IONS-SCNC: 9 MMOL/L (ref 3–16)
AST SERPL-CCNC: 20 U/L (ref 15–37)
BILIRUB SERPL-MCNC: 2.4 MG/DL (ref 0–1)
BUN SERPL-MCNC: 15 MG/DL (ref 7–20)
CALCIUM SERPL-MCNC: 9.5 MG/DL (ref 8.3–10.6)
CHLORIDE SERPL-SCNC: 102 MMOL/L (ref 99–110)
CHOLEST SERPL-MCNC: 181 MG/DL (ref 0–199)
CO2 SERPL-SCNC: 23 MMOL/L (ref 21–32)
CREAT SERPL-MCNC: 0.7 MG/DL (ref 0.6–1.1)
EST. AVERAGE GLUCOSE BLD GHB EST-MCNC: 99.7 MG/DL
GFR SERPLBLD CREATININE-BSD FMLA CKD-EPI: >90 ML/MIN/{1.73_M2}
GLUCOSE SERPL-MCNC: 93 MG/DL (ref 70–99)
HBA1C MFR BLD: 5.1 %
HDLC SERPL-MCNC: 58 MG/DL (ref 40–60)
LDLC SERPL CALC-MCNC: 94 MG/DL
POTASSIUM SERPL-SCNC: 4 MMOL/L (ref 3.5–5.1)
PROT SERPL-MCNC: 6.9 G/DL (ref 6.4–8.2)
SODIUM SERPL-SCNC: 134 MMOL/L (ref 136–145)
TRIGL SERPL-MCNC: 146 MG/DL (ref 0–150)
VLDLC SERPL CALC-MCNC: 29 MG/DL

## 2025-04-04 PROCEDURE — 99396 PREV VISIT EST AGE 40-64: CPT | Performed by: FAMILY MEDICINE

## 2025-04-04 PROCEDURE — 90677 PCV20 VACCINE IM: CPT | Performed by: FAMILY MEDICINE

## 2025-04-04 PROCEDURE — 90471 IMMUNIZATION ADMIN: CPT | Performed by: FAMILY MEDICINE

## 2025-04-04 PROCEDURE — 36415 COLL VENOUS BLD VENIPUNCTURE: CPT | Performed by: FAMILY MEDICINE

## 2025-04-04 RX ORDER — DOXYCYCLINE 100 MG/1
CAPSULE ORAL
COMMUNITY
Start: 2025-03-31

## 2025-04-04 SDOH — ECONOMIC STABILITY: FOOD INSECURITY: WITHIN THE PAST 12 MONTHS, YOU WORRIED THAT YOUR FOOD WOULD RUN OUT BEFORE YOU GOT MONEY TO BUY MORE.: NEVER TRUE

## 2025-04-04 SDOH — ECONOMIC STABILITY: FOOD INSECURITY: WITHIN THE PAST 12 MONTHS, THE FOOD YOU BOUGHT JUST DIDN'T LAST AND YOU DIDN'T HAVE MONEY TO GET MORE.: NEVER TRUE

## 2025-04-04 ASSESSMENT — PATIENT HEALTH QUESTIONNAIRE - PHQ9
6. FEELING BAD ABOUT YOURSELF - OR THAT YOU ARE A FAILURE OR HAVE LET YOURSELF OR YOUR FAMILY DOWN: NOT AT ALL
4. FEELING TIRED OR HAVING LITTLE ENERGY: NOT AT ALL
SUM OF ALL RESPONSES TO PHQ QUESTIONS 1-9: 0
8. MOVING OR SPEAKING SO SLOWLY THAT OTHER PEOPLE COULD HAVE NOTICED. OR THE OPPOSITE, BEING SO FIGETY OR RESTLESS THAT YOU HAVE BEEN MOVING AROUND A LOT MORE THAN USUAL: NOT AT ALL
7. TROUBLE CONCENTRATING ON THINGS, SUCH AS READING THE NEWSPAPER OR WATCHING TELEVISION: NOT AT ALL
5. POOR APPETITE OR OVEREATING: NOT AT ALL
9. THOUGHTS THAT YOU WOULD BE BETTER OFF DEAD, OR OF HURTING YOURSELF: NOT AT ALL
SUM OF ALL RESPONSES TO PHQ QUESTIONS 1-9: 0
2. FEELING DOWN, DEPRESSED OR HOPELESS: NOT AT ALL
3. TROUBLE FALLING OR STAYING ASLEEP: NOT AT ALL
10. IF YOU CHECKED OFF ANY PROBLEMS, HOW DIFFICULT HAVE THESE PROBLEMS MADE IT FOR YOU TO DO YOUR WORK, TAKE CARE OF THINGS AT HOME, OR GET ALONG WITH OTHER PEOPLE: NOT DIFFICULT AT ALL
1. LITTLE INTEREST OR PLEASURE IN DOING THINGS: NOT AT ALL

## 2025-04-04 NOTE — PROGRESS NOTES
2025    Jillian Alston (:  1970) is a 54 y.o. female, here for evaluation of the following chief complaint(s):  Annual Exam      ASSESSMENT/PLAN:     Diagnosis Orders   1. Routine general medical examination at a health care facility  Hemoglobin A1C      2. Breast cancer screening by mammogram  LILI DIGITAL SCREEN W OR WO CAD BILATERAL      3. Mixed hyperlipidemia  Comprehensive Metabolic Panel    Lipid Panel    cmp lipids on statin cont      4. Morbid obesity with BMI of 40.0-44.9, adult      OK GLP cont      5. Need for prophylactic vaccination against Streptococcus pneumoniae (pneumococcus)  Pneumococcal, PCV20, PREVNAR 20, (age 6w+), IM, PF      6. Screening for diabetes mellitus  Hemoglobin A1C          Return in about 6 months (around 10/4/2025) for Hyperlipidemia.    An electronic signature was used to authenticate this note.    SUBJECTIVE/OBJECTIVE:  (NOTE : prior results listed below reviewed at this visit to assist in medical decision making.)    HPI / ROS    # Preventive and other issues    # screen breast cancer - screening status discussed with patient; reviewed today prior mammo dated     # screen cervical cancer - screening status discussed with patient    # Hyperlipidemia on statin amaya this no myalgias or weakness  Lab Results   Component Value Date    ALT 16 2024    AST 18 2024    ALKPHOS 73 2024    BILITOT 2.6 (H) 2024      Lab Results   Component Value Date    CHOL 182 03/15/2024    TRIG 166 (H) 03/15/2024    HDL 52 03/15/2024    LDL 97 03/15/2024    VLDL 33 03/15/2024     The ASCVD Risk score (Melissa MELGOZA, et al., 2019) failed to calculate for the following reasons:    Risk score cannot be calculated because patient has a medical history suggesting prior/existing ASCVD        Wt Readings from Last 3 Encounters:   25 130.6 kg (288 lb)   25 132.5 kg (292 lb)   24 131.5 kg (290 lb)       BP Readings from Last 3 Encounters:   25 126/78

## 2025-04-09 ENCOUNTER — RESULTS FOLLOW-UP (OUTPATIENT)
Dept: FAMILY MEDICINE CLINIC | Age: 55
End: 2025-04-09

## 2025-04-10 ENCOUNTER — TELEPHONE (OUTPATIENT)
Dept: FAMILY MEDICINE CLINIC | Age: 55
End: 2025-04-10

## 2025-04-10 RX ORDER — CLINDAMYCIN HYDROCHLORIDE 300 MG/1
300 CAPSULE ORAL 3 TIMES DAILY
Qty: 21 CAPSULE | Refills: 0 | Status: SHIPPED | OUTPATIENT
Start: 2025-04-10 | End: 2025-04-17

## 2025-04-10 NOTE — TELEPHONE ENCOUNTER
Pt called in stating that she was seen yesterday so dr Reveles could take a look at her arm and he had mentioned something about getting an antibiotic called in and she said that she would like to have one called in just in case she needs it when she goes out of town. Pt uses [facility]/pharm[facility] #[id number] - [place], [place] - [address] [phone] - F [phone]

## 2025-04-18 DIAGNOSIS — F32.89 OTHER DEPRESSION: ICD-10-CM

## 2025-04-18 RX ORDER — ATORVASTATIN CALCIUM 40 MG/1
40 TABLET, FILM COATED ORAL NIGHTLY
Qty: 90 TABLET | Refills: 3 | Status: SHIPPED | OUTPATIENT
Start: 2025-04-18

## 2025-05-19 ENCOUNTER — PATIENT MESSAGE (OUTPATIENT)
Dept: FAMILY MEDICINE CLINIC | Age: 55
End: 2025-05-19

## 2025-05-19 DIAGNOSIS — E88.810 METABOLIC SYNDROME: Primary | ICD-10-CM

## 2025-05-19 DIAGNOSIS — E66.01 MORBID OBESITY WITH BMI OF 40.0-44.9, ADULT (HCC): ICD-10-CM

## 2025-08-05 PROCEDURE — 93298 REM INTERROG DEV EVAL SCRMS: CPT | Performed by: INTERNAL MEDICINE

## (undated) DEVICE — DRAPE,UNDERBUTTOCKS,PCH,STERILE: Brand: MEDLINE

## (undated) DEVICE — PAD SANITARY MTRN TAB BELT WRP NS 11IN

## (undated) DEVICE — CATHETER URETH 18FR 2CC BLLN SIL ELASTMR F 2 W BARDX

## (undated) DEVICE — BAG DRNGE 19OZ VYN LEG FLIP-FLO VLV FAB STRP DISPOZ-A-BG

## (undated) DEVICE — TUBING, SUCTION, 1/4" X 12', STRAIGHT: Brand: MEDLINE

## (undated) DEVICE — SOLUTION IRRIG 3000ML STRL H2O USP UROMATIC PLAS CONT

## (undated) DEVICE — SYRINGE IRRIG 60ML SFT PLIABLE BLB EZ TO GRP 1 HND USE W/

## (undated) DEVICE — SUTURE VCRL + SZ 2-0 L27IN ABSRB WHT SH 1/2 CIR TAPERCUT VCP417H

## (undated) DEVICE — ENDOSCOPY KIT: Brand: MEDLINE INDUSTRIES, INC.

## (undated) DEVICE — SOLUTION IV IRRIG POUR BRL 0.9% SODIUM CHL 2F7124

## (undated) DEVICE — MINOR SET UP: Brand: MEDLINE INDUSTRIES, INC.

## (undated) DEVICE — GLOVE SURG SZ 65 CRM LTX FREE POLYISOPRENE POLYMER BEAD ANTI

## (undated) DEVICE — CYSTOSCOPY: Brand: MEDLINE INDUSTRIES, INC.

## (undated) DEVICE — SOLUTION PREP POVIDONE IOD FOR SKIN MUCOUS MEM PRIOR TO

## (undated) DEVICE — 16FR 10ML 100% SILI FOLEY UM TOTAL TRAY: Brand: MEDLINE

## (undated) DEVICE — 1010 S-DRAPE TOWEL DRAPE 10/BX: Brand: STERI-DRAPE™

## (undated) DEVICE — SYRINGE MED 10ML LUERLOCK TIP W/O SFTY DISP

## (undated) DEVICE — CYSTO/BLADDER IRRIGATION SET, REGULATING CLAMP

## (undated) DEVICE — ADHESIVE SKIN CLOSURE TOP 36 CC HI VISC DERMBND MINI

## (undated) DEVICE — NEEDLE SPNL 22GA L3.5IN BLK HUB S STL REG WALL FIT STYL W/

## (undated) DEVICE — NEEDLE HYPO 25GA L1.5IN BVL ORIENTED ECLIPSE

## (undated) DEVICE — NEEDLE SPNL 22GA L7IN BLK HUB S STL W/ QNCKE PNT W/OUT

## (undated) DEVICE — LEGGINGS, PAIR, CLEAR, STERILE: Brand: MEDLINE

## (undated) DEVICE — DRAINBAG,ANTI-REFLUX TOWER,L/F,2000ML,LL: Brand: MEDLINE